# Patient Record
Sex: MALE | Race: WHITE | Employment: OTHER | URBAN - METROPOLITAN AREA
[De-identification: names, ages, dates, MRNs, and addresses within clinical notes are randomized per-mention and may not be internally consistent; named-entity substitution may affect disease eponyms.]

---

## 2018-07-09 PROBLEM — N40.1 BPH WITH OBSTRUCTION/LOWER URINARY TRACT SYMPTOMS: Status: ACTIVE | Noted: 2018-07-09

## 2018-07-09 PROBLEM — N39.0 RECURRENT UTI: Status: ACTIVE | Noted: 2018-07-09

## 2018-07-09 PROBLEM — N40.2 PROSTATE NODULE: Status: ACTIVE | Noted: 2018-07-09

## 2018-07-09 PROBLEM — N13.8 BPH WITH OBSTRUCTION/LOWER URINARY TRACT SYMPTOMS: Status: ACTIVE | Noted: 2018-07-09

## 2022-03-15 ENCOUNTER — OFFICE VISIT (OUTPATIENT)
Dept: ORTHOPEDIC SURGERY | Age: 72
End: 2022-03-15
Payer: MEDICARE

## 2022-03-15 VITALS — WEIGHT: 290 LBS | BODY MASS INDEX: 35.31 KG/M2 | HEIGHT: 76 IN

## 2022-03-15 DIAGNOSIS — M17.0 OSTEOARTHRITIS OF BOTH KNEES, UNSPECIFIED OSTEOARTHRITIS TYPE: ICD-10-CM

## 2022-03-15 DIAGNOSIS — M25.561 PAIN IN BOTH KNEES, UNSPECIFIED CHRONICITY: Primary | ICD-10-CM

## 2022-03-15 DIAGNOSIS — M25.561 PAIN IN BOTH KNEES, UNSPECIFIED CHRONICITY: ICD-10-CM

## 2022-03-15 DIAGNOSIS — M25.562 PAIN IN BOTH KNEES, UNSPECIFIED CHRONICITY: ICD-10-CM

## 2022-03-15 DIAGNOSIS — M25.562 PAIN IN BOTH KNEES, UNSPECIFIED CHRONICITY: Primary | ICD-10-CM

## 2022-03-15 PROCEDURE — 1101F PT FALLS ASSESS-DOCD LE1/YR: CPT | Performed by: ORTHOPAEDIC SURGERY

## 2022-03-15 PROCEDURE — G8536 NO DOC ELDER MAL SCRN: HCPCS | Performed by: ORTHOPAEDIC SURGERY

## 2022-03-15 PROCEDURE — G8417 CALC BMI ABV UP PARAM F/U: HCPCS | Performed by: ORTHOPAEDIC SURGERY

## 2022-03-15 PROCEDURE — 99204 OFFICE O/P NEW MOD 45 MIN: CPT | Performed by: ORTHOPAEDIC SURGERY

## 2022-03-15 PROCEDURE — 3017F COLORECTAL CA SCREEN DOC REV: CPT | Performed by: ORTHOPAEDIC SURGERY

## 2022-03-15 PROCEDURE — G8427 DOCREV CUR MEDS BY ELIG CLIN: HCPCS | Performed by: ORTHOPAEDIC SURGERY

## 2022-03-15 PROCEDURE — G8510 SCR DEP NEG, NO PLAN REQD: HCPCS | Performed by: ORTHOPAEDIC SURGERY

## 2022-03-15 RX ORDER — CIPROFLOXACIN 500 MG/1
TABLET ORAL
COMMUNITY
Start: 2021-09-13 | End: 2022-07-28

## 2022-03-15 RX ORDER — FLUOROURACIL 50 MG/G
CREAM TOPICAL
COMMUNITY
Start: 2021-09-24 | End: 2022-07-28

## 2022-03-15 RX ORDER — VANCOMYCIN HYDROCHLORIDE 125 MG/1
CAPSULE ORAL
COMMUNITY
Start: 2021-09-10 | End: 2022-07-28

## 2022-03-15 RX ORDER — NEOMYCIN SULFATE, POLYMYXIN B SULFATE, HYDROCORTISONE 3.5; 10000; 1 MG/ML; [USP'U]/ML; MG/ML
SOLUTION/ DROPS AURICULAR (OTIC)
COMMUNITY
Start: 2021-09-07 | End: 2022-07-28

## 2022-03-15 RX ORDER — AZITHROMYCIN 500 MG/1
TABLET, FILM COATED ORAL
COMMUNITY
Start: 2022-02-21 | End: 2022-07-28

## 2022-03-15 RX ORDER — SUCRALFATE 1 G/10ML
SUSPENSION ORAL
COMMUNITY
Start: 2022-03-07 | End: 2022-07-28

## 2022-03-15 RX ORDER — PANTOPRAZOLE SODIUM 40 MG/1
40 TABLET, DELAYED RELEASE ORAL DAILY
COMMUNITY
Start: 2022-03-07 | End: 2022-07-28

## 2022-03-15 RX ORDER — CIPROFLOXACIN 250 MG/1
TABLET, FILM COATED ORAL
COMMUNITY
Start: 2021-09-13 | End: 2022-07-28

## 2022-03-15 RX ORDER — PANTOPRAZOLE SODIUM 40 MG/1
TABLET, DELAYED RELEASE ORAL
COMMUNITY
Start: 2022-03-07

## 2022-03-15 RX ORDER — MUPIROCIN 20 MG/G
OINTMENT TOPICAL
COMMUNITY
Start: 2021-12-17 | End: 2022-07-28

## 2022-03-15 RX ORDER — ECONAZOLE NITRATE 10 MG/G
CREAM TOPICAL
COMMUNITY
Start: 2021-09-24 | End: 2022-07-28

## 2022-03-15 RX ORDER — GUAIFENESIN 1200 MG
TABLET, EXTENDED RELEASE 12 HR ORAL
COMMUNITY

## 2022-03-15 RX ORDER — OMEPRAZOLE 20 MG/1
CAPSULE, DELAYED RELEASE ORAL
COMMUNITY
Start: 2021-12-28 | End: 2022-07-28

## 2022-03-15 RX ORDER — IBUPROFEN 800 MG/1
TABLET ORAL
COMMUNITY
Start: 2021-08-05 | End: 2022-07-28

## 2022-03-15 NOTE — PROGRESS NOTES
Name: Gray Whittington    : 1950     Service Dept: [address] [place] [address] Medicine    Chief Complaint   Patient presents with    Knee Pain        Visit Vitals  Ht 6' 4\" (1.93 m)   Wt 290 lb (131.5 kg)   BMI 35.30 kg/m²        No Known Allergies     Current Outpatient Medications   Medication Sig Dispense Refill    ibuprofen (MOTRIN) 800 mg tablet       azithromycin (ZITHROMAX) 500 mg tab       ciprofloxacin HCl (CIPRO) 250 mg tablet       ciprofloxacin HCl (CIPRO) 500 mg tablet       econazole nitrate (SPECTAZOLE) 1 % topical cream       fluorouraciL (EFUDEX) 5 % chemo cream       mupirocin (BACTROBAN) 2 % ointment       pantoprazole (PROTONIX) 40 mg tablet       neomycin-polymyxin-hydrocortisone (CORTISPORIN) otic solution       sucralfate (CARAFATE) 100 mg/mL suspension       vancomycin (VANCOCIN) 125 mg capsule       acetaminophen (TylenoL) 325 mg cap Tylenol 325 mg capsule   PRN      azithromycin (ZITHROMAX) 500 mg tab take 1 tablet by mouth once daily for 5 days      pantoprazole (PROTONIX) 40 mg tablet Take 40 mg by mouth daily.  mupirocin (BACTROBAN) 2 % ointment APPLY TO OPEN SORE TWICE DAILY UNTIL HEALED      sucralfate (CARAFATE) 100 mg/mL suspension take 10 milliliters by mouth twice a day ON AN EMPTY STOMACH      omeprazole (PRILOSEC) 20 mg capsule       cephALEXin (KEFLEX) 500 mg capsule TAKE 1 CAPSULE BY MOUTH TWICE DAILY FOR 7 DAYS      diclofenac (VOLTAREN) 1 % gel APPLY 4 GRAMS TO AFFECTED AREA THREE TIMES DAILY TO FOUR TIMES DAILY AS NEEDED      ondansetron hcl (ZOFRAN) 4 mg tablet TAKE 1 TO 2 TABLETS BY MOUTH EVERY 6 HOURS AS NEEDED FORNAUSEA AND VOMITING      trimethoprim-sulfamethoxazole (BACTRIM DS, SEPTRA DS) 160-800 mg per tablet TAKE 1 TABLET BY MOUTH TWICE DAILY FOR 10 DAYS      finasteride (PROSCAR) 5 mg tablet TAKE 1 TABLET DAILY 90 Tab 3    carvedilol (COREG) 6.25 mg tablet Take  by mouth two (2) times daily (with meals).       docusate sodium (COLACE) 100 mg capsule Take 100 mg by mouth.  omeprazole (PRILOSEC) 10 mg capsule Take 10 mg by mouth.  cholecalciferol (VITAMIN D3) 1,000 unit cap Take  by mouth daily.  ferrous sulfate (IRON) 325 mg (65 mg iron) tablet Take  by mouth Daily (before breakfast).  finasteride (PROSCAR) 5 mg tablet Take 1 Tab by mouth daily. 90 Tab 3    PRADAXA 150 mg capsule       dilTIAZem CD (CARDIZEM CD) 240 mg ER capsule         Patient Active Problem List   Diagnosis Code    Prostate nodule N40.2    BPH with obstruction/lower urinary tract symptoms N40.1, N13.8    Recurrent UTI N39.0      History reviewed. No pertinent family history. Social History     Socioeconomic History    Marital status:    Tobacco Use    Smoking status: Never Smoker    Smokeless tobacco: Never Used   Substance and Sexual Activity    Alcohol use: No      Past Surgical History:   Procedure Laterality Date    HX PARATHYROIDECTOMY        Past Medical History:   Diagnosis Date    Chronic atrial fibrillation (HCC)     Chronic diarrhea     HLD (hyperlipidemia)     HTN (hypertension)     Hyperparathyroidism (HCC)     Impaired glucose tolerance     Obesity     Pleural effusion         I have reviewed and agree with [address] [place] [address] Nw and ROS and intake form in chart and the record furthermore I have reviewed prior medical record(s) regarding this patients care during this appointment. Review of Systems:   Patient is a pleasant appearing individual, appropriately dressed, well hydrated, well nourished, who is alert, appropriately oriented for age, and in no acute distress with a normal gait and normal affect who does not appear to be in any significant pain.    Physical Exam:  Left Knee -Decrease range of motion with flexion, Knee arc of greater than 50 degrees, Some crepitation, Grossly neurovascularly intact, Good cap refill, No skin lesion, Moderate swelling, No gross instability, Some quadriceps weakness Kellgren and Johnnie Homme at least grade 3    Right Knee -Decrease range of motion with flexion, Some crepitation, Grossly neurovascularly intact, Good cap refill, No skin lesion, Moderate swelling, No gross instability, Some quadriceps weaknessKellgren and Harsh at least grade 3   Encounter Diagnoses     ICD-10-CM ICD-9-CM   1. Pain in both knees, unspecified chronicity  M25.561 719.46    M25.562    2. Osteoarthritis of both knees, unspecified osteoarthritis type  M17.0 715.96       HPI:  The patient is here with a chief complaint of bilateral knee pain, throbbing, burning pain, progressively getting worse. Pain is 6/10. X-rays are positive for severe OA, bilateral knees. Assessment/Plan:  1. Bilateral knee severe OA, failed conservative treatment. Plan will be for right total knee replacement and then 2 months later left total knee replacement with general medical and cardiac clearance. He is on Pradaxa as a blood thinner, so we will use that as well and go from there. He will need to stop his Pradaxa at least 3 days before the surgery. As part of continued conservative pain management options the patient was advised to utilize Tylenol or OTC NSAIDS as long as it is not medically contraindicated. Return to Office: Follow-up and Dispositions    · Return for schedule for surgery. Scribed by Chinedu Bustos LPN as dictated by [facility] JOCELYNE Luna MD.  Documentation True and Accepted Misha Luna MD

## 2022-03-15 NOTE — PATIENT INSTRUCTIONS
Knee Arthritis: Care Instructions  Your Care Instructions     Knee arthritis is a breakdown of the cartilage that cushions your knee joint. When the cartilage wears down, your bones rub against each other. This causes pain and stiffness. Knee arthritis tends to get worse with time. Treatment for knee arthritis involves reducing pain, making the leg muscles stronger, and staying at a healthy body weight. The treatment usually does not improve the health of the cartilage, but it can reduce pain and improve how well your knee works. You can take simple measures to protect your knee joints, ease your pain, and help you stay active. Follow-up care is a key part of your treatment and safety. Be sure to make and go to all appointments, and call your doctor if you are having problems. It's also a good idea to know your test results and keep a list of the medicines you take. How can you care for yourself at home? · Know that knee arthritis will cause more pain on some days than on others. · Stay at a healthy weight. Lose weight if you are overweight. When you stand up, the pressure on your knees from every pound of body weight is multiplied four times. So if you lose 10 pounds, you will reduce the pressure on your knees by 40 pounds. · Talk to your doctor or physical therapist about exercises that will help ease joint pain. ? Stretch to help prevent stiffness and to prevent injury before you exercise. You may enjoy gentle forms of yoga to help keep your knee joints and muscles flexible. ? Walk instead of jog.  ? Ride a bike. This makes your thigh muscles stronger and takes pressure off your knee. ? Wear well-fitting and comfortable shoes. ? Exercise in chest-deep water. This can help you exercise longer with less pain. ? Avoid exercises that include squatting or kneeling. They can put a lot of strain on your knees.   ? Talk to your doctor to make sure that the exercise you do is not making the arthritis worse.  · Do not sit for long periods of time. Try to walk once in a while to keep your knee from getting stiff. · Ask your doctor or physical therapist whether shoe inserts may reduce your arthritis pain. · If you can afford it, get new athletic shoes at least every year. This can help reduce the strain on your knees. · Use a device to help you do everyday activities. ? A cane or walking stick can help you keep your balance when you walk. Hold the cane or walking stick in the hand opposite the painful knee. ? If you feel like you may fall when you walk, try using crutches or a front-wheeled walker. These can prevent falls that could cause more damage to your knee. ? A knee brace may help keep your knee stable and prevent pain. ? You also can use other things to make life easier, such as a higher toilet seat and handrails in the bathtub or shower. · Take pain medicines exactly as directed. ? Do not wait until you are in severe pain. You will get better results if you take it sooner. ? If you are not taking a prescription pain medicine, take an over-the-counter medicine such as acetaminophen (Tylenol), ibuprofen (Advil, Motrin), or naproxen (Aleve). Read and follow all instructions on the label. ? Do not take two or more pain medicines at the same time unless the doctor told you to. Many pain medicines have acetaminophen, which is Tylenol. Too much acetaminophen (Tylenol) can be harmful. ? Tell your doctor if you take a blood thinner, have diabetes, or have allergies to shellfish. · Ask your doctor if you might benefit from a shot of steroid medicine into your knee. This may provide pain relief for several months. · Many people take the supplements glucosamine and chondroitin for osteoarthritis. Some people feel they help, but the medical research does not show that they work. Talk to your doctor before you take these supplements. When should you call for help?    Call your doctor now or seek immediate medical care if:    · You have sudden swelling, warmth, or pain in your knee.     · You have knee pain and a fever or rash.     · You have such bad pain that you cannot use your knee. Watch closely for changes in your health, and be sure to contact your doctor if you have any problems. Where can you learn more? Go to http://www.gray.com/  Enter W187 in the search box to learn more about \"Knee Arthritis: Care Instructions. \"  Current as of: December 20, 2021               Content Version: 13.2  © 8892-6251 [employer]. Care instructions adapted under license by [employer] (which disclaims liability or warranty for this information). If you have questions about a medical condition or this instruction, always ask your healthcare professional. [address] any warranty or liability for your use of this information.

## 2022-03-15 NOTE — LETTER
3/16/2022    Patient: Jose De Guzman   YOB: 1950   Date of Visit: 3/15/2022     Loyda Pereira MD  [facility] [address]  4 [facility]  [address] [place] [address]  Via Fax: [phone]    Dear Loyda Pereira MD,      Thank you for referring Mr. Jose De Guzman to [address] [place] [address] for evaluation. My notes for this consultation are attached. If you have questions, please do not hesitate to call me. I look forward to following your patient along with you.       Sincerely,    Eduard Hollis MD

## 2022-03-18 PROBLEM — N40.2 PROSTATE NODULE: Status: ACTIVE | Noted: 2018-07-09

## 2022-03-18 PROBLEM — N39.0 RECURRENT UTI: Status: ACTIVE | Noted: 2018-07-09

## 2022-03-20 PROBLEM — N13.8 BPH WITH OBSTRUCTION/LOWER URINARY TRACT SYMPTOMS: Status: ACTIVE | Noted: 2018-07-09

## 2022-03-20 PROBLEM — N40.1 BPH WITH OBSTRUCTION/LOWER URINARY TRACT SYMPTOMS: Status: ACTIVE | Noted: 2018-07-09

## 2022-07-06 ENCOUNTER — HOSPITAL ENCOUNTER (OUTPATIENT)
Dept: LAB | Age: 72
Discharge: HOME | End: 2022-07-06
Payer: MEDICARE

## 2022-07-06 ENCOUNTER — HOSPITAL ENCOUNTER (OUTPATIENT)
Dept: NON INVASIVE DIAGNOSTICS | Age: 72
Discharge: HOME | End: 2022-07-06
Payer: MEDICARE

## 2022-07-06 ENCOUNTER — HOSPITAL ENCOUNTER (OUTPATIENT)
Dept: GENERAL RADIOLOGY | Age: 72
Discharge: HOME | End: 2022-07-06
Attending: ORTHOPAEDIC SURGERY
Payer: MEDICARE

## 2022-07-06 DIAGNOSIS — M25.561 PAIN IN BOTH KNEES, UNSPECIFIED CHRONICITY: ICD-10-CM

## 2022-07-06 DIAGNOSIS — M17.0 OSTEOARTHRITIS OF BOTH KNEES, UNSPECIFIED OSTEOARTHRITIS TYPE: ICD-10-CM

## 2022-07-06 DIAGNOSIS — M25.562 PAIN IN BOTH KNEES, UNSPECIFIED CHRONICITY: ICD-10-CM

## 2022-07-06 LAB
ANION GAP SERPL CALC-SCNC: 4 MMOL/L (ref 3–18)
BASOPHILS # BLD: 0.1 K/UL (ref 0–0.1)
BASOPHILS NFR BLD: 1 % (ref 0–2)
BUN SERPL-MCNC: 21 MG/DL (ref 7–18)
BUN/CREAT SERPL: 19 (ref 12–20)
CA-I BLD-MCNC: 9.2 MG/DL (ref 8.5–10.1)
CALCULATED R AXIS, ECG10: 94 DEGREES
CALCULATED T AXIS, ECG11: 4 DEGREES
CHLORIDE SERPL-SCNC: 104 MMOL/L (ref 100–111)
CO2 SERPL-SCNC: 28 MMOL/L (ref 21–32)
CREAT SERPL-MCNC: 1.11 MG/DL (ref 0.6–1.3)
DIAGNOSIS, 93000: NORMAL
DIFFERENTIAL METHOD BLD: ABNORMAL
EOSINOPHIL # BLD: 0.1 K/UL (ref 0–0.4)
EOSINOPHIL NFR BLD: 2 % (ref 0–5)
ERYTHROCYTE [DISTWIDTH] IN BLOOD BY AUTOMATED COUNT: 13.3 % (ref 11.6–14.5)
GLUCOSE SERPL-MCNC: 99 MG/DL (ref 74–99)
HCT VFR BLD AUTO: 44.2 % (ref 36–48)
HGB BLD-MCNC: 14.8 G/DL (ref 13–16)
IMM GRANULOCYTES # BLD AUTO: 0 K/UL (ref 0–0.04)
IMM GRANULOCYTES NFR BLD AUTO: 0 % (ref 0–0.5)
LYMPHOCYTES # BLD: 1.5 K/UL (ref 0.9–3.6)
LYMPHOCYTES NFR BLD: 30 % (ref 21–52)
MCH RBC QN AUTO: 31.8 PG (ref 24–34)
MCHC RBC AUTO-ENTMCNC: 33.5 G/DL (ref 31–37)
MCV RBC AUTO: 95.1 FL (ref 78–100)
MONOCYTES # BLD: 0.4 K/UL (ref 0.05–1.2)
MONOCYTES NFR BLD: 8 % (ref 3–10)
NEUTS SEG # BLD: 2.9 K/UL (ref 1.8–8)
NEUTS SEG NFR BLD: 59 % (ref 40–73)
NRBC # BLD: 0 K/UL (ref 0–0.01)
NRBC BLD-RTO: 0 PER 100 WBC
PLATELET # BLD AUTO: 121 K/UL (ref 135–420)
PMV BLD AUTO: 10.6 FL (ref 9.2–11.8)
POTASSIUM SERPL-SCNC: 4.2 MMOL/L (ref 3.5–5.5)
Q-T INTERVAL, ECG07: 478 MS
QRS DURATION, ECG06: 150 MS
QTC CALCULATION (BEZET), ECG08: 537 MS
RBC # BLD AUTO: 4.65 M/UL (ref 4.35–5.65)
SODIUM SERPL-SCNC: 136 MMOL/L (ref 136–145)
VENTRICULAR RATE, ECG03: 76 BPM
WBC # BLD AUTO: 5 K/UL (ref 4.6–13.2)

## 2022-07-06 PROCEDURE — 93005 ELECTROCARDIOGRAM TRACING: CPT

## 2022-07-06 PROCEDURE — 85025 COMPLETE CBC W/AUTO DIFF WBC: CPT

## 2022-07-06 PROCEDURE — 80048 BASIC METABOLIC PNL TOTAL CA: CPT

## 2022-07-06 PROCEDURE — 36415 COLL VENOUS BLD VENIPUNCTURE: CPT

## 2022-07-06 PROCEDURE — 71046 X-RAY EXAM CHEST 2 VIEWS: CPT

## 2022-07-07 LAB
BACTERIA SPEC CULT: ABNORMAL
BACTERIA SPEC CULT: ABNORMAL
SPECIAL REQUESTS,SREQ: ABNORMAL

## 2022-07-28 DIAGNOSIS — Z96.651 STATUS POST TOTAL RIGHT KNEE REPLACEMENT: Primary | ICD-10-CM

## 2022-08-04 ENCOUNTER — TRANSCRIBE ORDER (OUTPATIENT)
Dept: REGISTRATION | Age: 72
End: 2022-08-04

## 2022-08-04 ENCOUNTER — HOSPITAL ENCOUNTER (OUTPATIENT)
Dept: PREADMISSION TESTING | Age: 72
Discharge: HOME | End: 2022-08-04

## 2022-08-04 ENCOUNTER — OFFICE VISIT (OUTPATIENT)
Dept: ORTHOPEDIC SURGERY | Age: 72
End: 2022-08-04
Payer: MEDICARE

## 2022-08-04 DIAGNOSIS — M25.561 PAIN IN BOTH KNEES, UNSPECIFIED CHRONICITY: Primary | ICD-10-CM

## 2022-08-04 DIAGNOSIS — M17.0 OSTEOARTHRITIS OF BOTH KNEES, UNSPECIFIED OSTEOARTHRITIS TYPE: ICD-10-CM

## 2022-08-04 DIAGNOSIS — M25.562 PAIN IN BOTH KNEES, UNSPECIFIED CHRONICITY: Primary | ICD-10-CM

## 2022-08-04 PROCEDURE — G8536 NO DOC ELDER MAL SCRN: HCPCS | Performed by: ORTHOPAEDIC SURGERY

## 2022-08-04 PROCEDURE — 1101F PT FALLS ASSESS-DOCD LE1/YR: CPT | Performed by: ORTHOPAEDIC SURGERY

## 2022-08-04 PROCEDURE — 99214 OFFICE O/P EST MOD 30 MIN: CPT | Performed by: ORTHOPAEDIC SURGERY

## 2022-08-04 PROCEDURE — G8417 CALC BMI ABV UP PARAM F/U: HCPCS | Performed by: ORTHOPAEDIC SURGERY

## 2022-08-04 PROCEDURE — 3017F COLORECTAL CA SCREEN DOC REV: CPT | Performed by: ORTHOPAEDIC SURGERY

## 2022-08-04 PROCEDURE — 1123F ACP DISCUSS/DSCN MKR DOCD: CPT | Performed by: ORTHOPAEDIC SURGERY

## 2022-08-04 PROCEDURE — G8432 DEP SCR NOT DOC, RNG: HCPCS | Performed by: ORTHOPAEDIC SURGERY

## 2022-08-04 PROCEDURE — G8427 DOCREV CUR MEDS BY ELIG CLIN: HCPCS | Performed by: ORTHOPAEDIC SURGERY

## 2022-08-04 RX ORDER — TROPICAMIDE 10 MG/ML
SOLUTION/ DROPS OPHTHALMIC
Status: ON HOLD | COMMUNITY
Start: 2022-04-01 | End: 2022-08-26

## 2022-08-04 RX ORDER — CEPHALEXIN 500 MG/1
500 CAPSULE ORAL EVERY 8 HOURS
Qty: 9 CAPSULE | Refills: 0 | Status: SHIPPED | OUTPATIENT
Start: 2022-08-04 | End: 2022-08-07

## 2022-08-04 RX ORDER — ONDANSETRON 4 MG/1
4 TABLET, ORALLY DISINTEGRATING ORAL
Qty: 20 TABLET | Refills: 0 | Status: SHIPPED | OUTPATIENT
Start: 2022-08-04 | End: 2022-10-11 | Stop reason: SDUPTHER

## 2022-08-04 RX ORDER — PREDNISOLONE ACETATE 10 MG/ML
SUSPENSION/ DROPS OPHTHALMIC
Status: ON HOLD | COMMUNITY
Start: 2022-04-01 | End: 2022-08-26

## 2022-08-04 RX ORDER — OXYCODONE AND ACETAMINOPHEN 5; 325 MG/1; MG/1
1 TABLET ORAL
Qty: 30 TABLET | Refills: 0 | Status: SHIPPED | OUTPATIENT
Start: 2022-08-04 | End: 2022-08-18

## 2022-08-04 RX ORDER — TOBRAMYCIN 3 MG/ML
SOLUTION/ DROPS OPHTHALMIC
Status: ON HOLD | COMMUNITY
Start: 2022-04-01 | End: 2022-08-26

## 2022-08-04 RX ORDER — ONDANSETRON 4 MG/1
TABLET, ORALLY DISINTEGRATING ORAL
COMMUNITY
Start: 2022-07-13 | End: 2022-08-04 | Stop reason: ALTCHOICE

## 2022-08-04 NOTE — LETTER
8/8/2022    Patient: Jaison Nuñez   YOB: 1950   Date of Visit: 8/4/2022     Raad Perez MD  [facility] [address]  4 [facility]  [address] [place] [id number]  Via Fax: [phone]    Dear Raad Perez MD,      Thank you for referring Mr. Jaison Nuñez to [address] [place] [address] for evaluation. My notes for this consultation are attached. If you have questions, please do not hesitate to call me. I look forward to following your patient along with you.       Sincerely,    Any Donohue MD

## 2022-08-04 NOTE — PROGRESS NOTES
Name: Leonel Arroyo    : 1950     Service Dept: [address] [place] [address]    Chief Complaint   Patient presents with    Knee Pain    Pre-op Exam        There were no vitals taken for this visit. No Known Allergies     Current Outpatient Medications   Medication Sig Dispense Refill    ondansetron (ZOFRAN ODT) 4 mg disintegrating tablet       prednisoLONE acetate (PRED FORTE) 1 % ophthalmic suspension prednisolone acetate 1 % eye drops,suspension   instill 1 drop four times a day IN OPERATIVE EYE STARTING 1 DAY P...  (REFER TO PRESCRIPTION NOTES). tobramycin (TOBREX) 0.3 % ophthalmic solution       tropicamide (mydRIACYL) 1 % ophthalmic solution       apixaban (ELIQUIS) 5 mg tablet Take 5 mg by mouth two (2) times a day. acetaminophen 325 mg cap Tylenol 325 mg capsule   PRN      pantoprazole (PROTONIX) 40 mg tablet       cephALEXin (KEFLEX) 500 mg capsule TAKE 1 CAPSULE BY MOUTH TWICE DAILY FOR 7 DAYS      ondansetron hcl (ZOFRAN) 4 mg tablet TAKE 1 TO 2 TABLETS BY MOUTH EVERY 6 HOURS AS NEEDED FORNAUSEA AND VOMITING      finasteride (PROSCAR) 5 mg tablet TAKE 1 TABLET DAILY 90 Tab 3    carvedilol (COREG) 6.25 mg tablet Take  by mouth two (2) times daily (with meals). docusate sodium (COLACE) 100 mg capsule Take 100 mg by mouth. cholecalciferol (VITAMIN D3) 25 mcg (1,000 unit) cap Take  by mouth daily. ferrous sulfate 325 mg (65 mg iron) tablet Take  by mouth Daily (before breakfast). dilTIAZem CD (CARDIZEM CD) 240 mg ER capsule         Patient Active Problem List   Diagnosis Code    Prostate nodule N40.2    BPH with obstruction/lower urinary tract symptoms N40.1, N13.8    Recurrent UTI N39.0      History reviewed. No pertinent family history.    Social History     Socioeconomic History    Marital status:    Tobacco Use    Smoking status: Never    Smokeless tobacco: Never   Vaping Use    Vaping Use: Never used   Substance and Sexual Activity    Alcohol use: No      Past Surgical History:   Procedure Laterality Date    HX PARATHYROIDECTOMY        Past Medical History:   Diagnosis Date    Chronic atrial fibrillation (HCC)     Chronic diarrhea     HLD (hyperlipidemia)     HTN (hypertension)     Hyperparathyroidism (HCC)     Impaired glucose tolerance     Obesity     Pleural effusion         I have reviewed and agree with PFSH and ROS and intake form in chart and the record furthermore I have reviewed prior medical record(s) regarding this patients care during this appointment. Review of Systems:   Patient is a pleasant appearing individual, appropriately dressed, well hydrated, well nourished, who is alert, appropriately oriented for age, and in no acute distress with a normal gait and normal affect who does not appear to be in any significant pain. Physical Exam:  Right Knee -Decrease range of motion with flexion, Knee arc of greater than 50 degrees, Some crepitation, Grossly neurovascularly intact, Good cap refill, No skin lesion, Moderate swelling, some gross instability, Some quadriceps weakness, Kellgren and Harsh at least grade 3    Left Knee - Full Range of Motion, No crepitation, Grossly neurovascularly intact, Good cap refill, No skin lesion, No swelling, No gross instability, No quadriceps weakness     Inpatient status: The patient has admitted to severe pain in the affected knee and due to such pain they are unable to complete activities of daily living at home and/or work on a regular basis where conservative treatments have failed. After extensive discussion with the patient, they have chosen to receive a total knee replacement with the expectation of inpatient procedure. Their dependent functional status (i.e. lack of capable support and safety at home, pain management, comorbities, or difficulty ambulating with assistive walking devices) would deem them a candidate for an inpatient stay.  The patient acknowledges and understand the plan. The risks of surgery were explained to the patient which include but not limited to infection, nerve injury, artery injury, tendon injury, poor result, poor wound healing, unforeseen incidence, bleeding, infection, nerve damage, failure to improve, worsening of symptoms, morbidity, and mortality risks were explained. All questions were answered. Patient was told of no guarantees. Patient accepts all risks and benefits. A consent for surgery will be documented and signed by the patient or a legal guardian. All questions were answered. The procedure was explained in detail. The patient was counseled about the risks of ike Covid-19 during their perioperative period and any recovery window from their procedure. The patient was made aware that ike Covid-19 may worsen their prognosis for recovering from their procedure and lend to a higher morbidity and/or mortality risk. All material risks, benefits, and reasonable alternatives including postponing the procedure were discussed. The patient DOES wish to proceed with their procedure at this time. Encounter Diagnoses     ICD-10-CM ICD-9-CM   1. Pain in both knees, unspecified chronicity  M25.561 719.46    M25.562    2. Osteoarthritis of both knees, unspecified osteoarthritis type  M17.0 715.96       HPI:  The patient is here with a chief complaint of right knee pain, throbbing, burning pain, progressively getting worse. Pain is 7/10. X-rays are positive for severe OA of the right knee. Failed conservative treatment. Assessment/Plan:  Plan will be for right total knee replacement. No history of blood clots. He is on Eliquis for his heart. He is going to stop that 3 days before and we will tract him with tract patch as well and go from there. As part of continued conservative pain management options the patient was advised to utilize Tylenol or OTC NSAIDS as long as it is not medically contraindicated.      Return to Office: Follow-up and Dispositions    Return for already scheduled for surgery. Scribed by Daivd Merlin, LPN as dictated by [facility] JOCELYNE Chavez MD.  Documentation True and Accepted Misha Chavez MD

## 2022-08-04 NOTE — PATIENT INSTRUCTIONS
Knee Arthritis: Care Instructions  Your Care Instructions     Knee arthritis is a breakdown of the cartilage that cushions your knee joint. When the cartilage wears down, your bones rub against each other. This causes pain and stiffness. Knee arthritis tends to get worse with time. Treatment for knee arthritis involves reducing pain, making the leg muscles stronger, and staying at a healthy body weight. The treatment usually does not improve the health of the cartilage, but it can reduce pain and improve how well your knee works. You can take simple measures to protect your knee joints, ease your pain, and help you stay active. Follow-up care is a key part of your treatment and safety. Be sure to make and go to all appointments, and call your doctor if you are having problems. It's also a good idea to know your test results and keep a list of the medicines you take. How can you care for yourself at home? Know that knee arthritis will cause more pain on some days than on others. Stay at a healthy weight. Lose weight if you are overweight. When you stand up, the pressure on your knees from every pound of body weight is multiplied four times. So if you lose 10 pounds, you will reduce the pressure on your knees by 40 pounds. Talk to your doctor or physical therapist about exercises that will help ease joint pain. Stretch to help prevent stiffness and to prevent injury before you exercise. You may enjoy gentle forms of yoga to help keep your knee joints and muscles flexible. Walk instead of jog. Ride a bike. This makes your thigh muscles stronger and takes pressure off your knee. Wear well-fitting and comfortable shoes. Exercise in chest-deep water. This can help you exercise longer with less pain. Avoid exercises that include squatting or kneeling. They can put a lot of strain on your knees. Talk to your doctor to make sure that the exercise you do is not making the arthritis worse.   Do not sit for long periods of time. Try to walk once in a while to keep your knee from getting stiff. Ask your doctor or physical therapist whether shoe inserts may reduce your arthritis pain. If you can afford it, get new athletic shoes at least every year. This can help reduce the strain on your knees. Use a device to help you do everyday activities. A cane or walking stick can help you keep your balance when you walk. Hold the cane or walking stick in the hand opposite the painful knee. If you feel like you may fall when you walk, try using crutches or a front-wheeled walker. These can prevent falls that could cause more damage to your knee. A knee brace may help keep your knee stable and prevent pain. You also can use other things to make life easier, such as a higher toilet seat and handrails in the bathtub or shower. Take pain medicines exactly as directed. Do not wait until you are in severe pain. You will get better results if you take it sooner. If you are not taking a prescription pain medicine, take an over-the-counter medicine such as acetaminophen (Tylenol), ibuprofen (Advil, Motrin), or naproxen (Aleve). Read and follow all instructions on the label. Do not take two or more pain medicines at the same time unless the doctor told you to. Many pain medicines have acetaminophen, which is Tylenol. Too much acetaminophen (Tylenol) can be harmful. Tell your doctor if you take a blood thinner, have diabetes, or have allergies to shellfish. Ask your doctor if you might benefit from a shot of steroid medicine into your knee. This may provide pain relief for several months. Many people take the supplements glucosamine and chondroitin for osteoarthritis. Some people feel they help, but the medical research does not show that they work. Talk to your doctor before you take these supplements. When should you call for help?    Call your doctor now or seek immediate medical care if:    You have sudden swelling, warmth, or pain in your knee. You have knee pain and a fever or rash. You have such bad pain that you cannot use your knee. Watch closely for changes in your health, and be sure to contact your doctor if you have any problems. Where can you learn more? Go to http://www.gray.com/  Enter W187 in the search box to learn more about \"Knee Arthritis: Care Instructions. \"  Current as of: December 20, 2021               Content Version: 13.2  © 2006-2022 [employer] Care instructions adapted under license by [employer] (which disclaims liability or warranty for this information). If you have questions about a medical condition or this instruction, always ask your healthcare professional. [address] any warranty or liability for your use of this information.

## 2022-08-04 NOTE — H&P (VIEW-ONLY)
Name: Vijay Anthony    : 1950     Service Dept: [address] [place] [address] Medicine    Chief Complaint   Patient presents with    Knee Pain    Pre-op Exam        There were no vitals taken for this visit. No Known Allergies     Current Outpatient Medications   Medication Sig Dispense Refill    ondansetron (ZOFRAN ODT) 4 mg disintegrating tablet       prednisoLONE acetate (PRED FORTE) 1 % ophthalmic suspension prednisolone acetate 1 % eye drops,suspension   instill 1 drop four times a day IN OPERATIVE EYE STARTING 1 DAY P...  (REFER TO PRESCRIPTION NOTES). tobramycin (TOBREX) 0.3 % ophthalmic solution       tropicamide (mydRIACYL) 1 % ophthalmic solution       apixaban (ELIQUIS) 5 mg tablet Take 5 mg by mouth two (2) times a day. acetaminophen 325 mg cap Tylenol 325 mg capsule   PRN      pantoprazole (PROTONIX) 40 mg tablet       cephALEXin (KEFLEX) 500 mg capsule TAKE 1 CAPSULE BY MOUTH TWICE DAILY FOR 7 DAYS      ondansetron hcl (ZOFRAN) 4 mg tablet TAKE 1 TO 2 TABLETS BY MOUTH EVERY 6 HOURS AS NEEDED FORNAUSEA AND VOMITING      finasteride (PROSCAR) 5 mg tablet TAKE 1 TABLET DAILY 90 Tab 3    carvedilol (COREG) 6.25 mg tablet Take  by mouth two (2) times daily (with meals). docusate sodium (COLACE) 100 mg capsule Take 100 mg by mouth. cholecalciferol (VITAMIN D3) 25 mcg (1,000 unit) cap Take  by mouth daily. ferrous sulfate 325 mg (65 mg iron) tablet Take  by mouth Daily (before breakfast). dilTIAZem CD (CARDIZEM CD) 240 mg ER capsule         Patient Active Problem List   Diagnosis Code    Prostate nodule N40.2    BPH with obstruction/lower urinary tract symptoms N40.1, N13.8    Recurrent UTI N39.0      History reviewed. No pertinent family history.    Social History     Socioeconomic History    Marital status:    Tobacco Use    Smoking status: Never    Smokeless tobacco: Never   Vaping Use    Vaping Use: Never used   Substance and Sexual Activity    Alcohol use: No      Past Surgical History:   Procedure Laterality Date    HX PARATHYROIDECTOMY        Past Medical History:   Diagnosis Date    Chronic atrial fibrillation (HCC)     Chronic diarrhea     HLD (hyperlipidemia)     HTN (hypertension)     Hyperparathyroidism (HCC)     Impaired glucose tolerance     Obesity     Pleural effusion         I have reviewed and agree with PFSH and ROS and intake form in chart and the record furthermore I have reviewed prior medical record(s) regarding this patients care during this appointment. Review of Systems:   Patient is a pleasant appearing individual, appropriately dressed, well hydrated, well nourished, who is alert, appropriately oriented for age, and in no acute distress with a normal gait and normal affect who does not appear to be in any significant pain. Physical Exam:  Right Knee -Decrease range of motion with flexion, Knee arc of greater than 50 degrees, Some crepitation, Grossly neurovascularly intact, Good cap refill, No skin lesion, Moderate swelling, some gross instability, Some quadriceps weakness, Kellgren and Harsh at least grade 3    Left Knee - Full Range of Motion, No crepitation, Grossly neurovascularly intact, Good cap refill, No skin lesion, No swelling, No gross instability, No quadriceps weakness     Inpatient status: The patient has admitted to severe pain in the affected knee and due to such pain they are unable to complete activities of daily living at home and/or work on a regular basis where conservative treatments have failed. After extensive discussion with the patient, they have chosen to receive a total knee replacement with the expectation of inpatient procedure. Their dependent functional status (i.e. lack of capable support and safety at home, pain management, comorbities, or difficulty ambulating with assistive walking devices) would deem them a candidate for an inpatient stay.  The patient acknowledges and understand the plan. The risks of surgery were explained to the patient which include but not limited to infection, nerve injury, artery injury, tendon injury, poor result, poor wound healing, unforeseen incidence, bleeding, infection, nerve damage, failure to improve, worsening of symptoms, morbidity, and mortality risks were explained. All questions were answered. Patient was told of no guarantees. Patient accepts all risks and benefits. A consent for surgery will be documented and signed by the patient or a legal guardian. All questions were answered. The procedure was explained in detail. The patient was counseled about the risks of ike Covid-19 during their perioperative period and any recovery window from their procedure. The patient was made aware that ike Covid-19 may worsen their prognosis for recovering from their procedure and lend to a higher morbidity and/or mortality risk. All material risks, benefits, and reasonable alternatives including postponing the procedure were discussed. The patient DOES wish to proceed with their procedure at this time. Encounter Diagnoses     ICD-10-CM ICD-9-CM   1. Pain in both knees, unspecified chronicity  M25.561 719.46    M25.562    2. Osteoarthritis of both knees, unspecified osteoarthritis type  M17.0 715.96       HPI:  The patient is here with a chief complaint of right knee pain, throbbing, burning pain, progressively getting worse. Pain is 7/10. X-rays are positive for severe OA of the right knee. Failed conservative treatment. Assessment/Plan:  Plan will be for right total knee replacement. No history of blood clots. He is on Eliquis for his heart. He is going to stop that 3 days before and we will tract him with tract patch as well and go from there. As part of continued conservative pain management options the patient was advised to utilize Tylenol or OTC NSAIDS as long as it is not medically contraindicated.      Return to Office: Follow-up and Dispositions    Return for already scheduled for surgery. Scribed by Esther Moore LPN as dictated by [facility] JOCELYNE Wong MD.  Documentation True and Accepted Misha JOCELYNE Wong MD

## 2022-08-09 ENCOUNTER — ANESTHESIA EVENT (OUTPATIENT)
Dept: SURGERY | Age: 72
End: 2022-08-09
Payer: MEDICARE

## 2022-08-10 RX ORDER — MAGNESIUM SULFATE 100 %
4 CRYSTALS MISCELLANEOUS AS NEEDED
Status: CANCELLED | OUTPATIENT
Start: 2022-08-10

## 2022-08-10 RX ORDER — DEXTROSE 50 % IN WATER (D50W) INTRAVENOUS SYRINGE
25-50 AS NEEDED
Status: CANCELLED | OUTPATIENT
Start: 2022-08-10

## 2022-08-17 ENCOUNTER — APPOINTMENT (OUTPATIENT)
Dept: GENERAL RADIOLOGY | Age: 72
End: 2022-08-17
Attending: NURSE PRACTITIONER
Payer: MEDICARE

## 2022-08-17 ENCOUNTER — HOSPITAL ENCOUNTER (OUTPATIENT)
Age: 72
Discharge: HOME | End: 2022-08-17
Attending: ORTHOPAEDIC SURGERY | Admitting: ORTHOPAEDIC SURGERY
Payer: MEDICARE

## 2022-08-17 ENCOUNTER — ANESTHESIA (OUTPATIENT)
Dept: SURGERY | Age: 72
End: 2022-08-17
Payer: MEDICARE

## 2022-08-17 ENCOUNTER — HOSPITAL ENCOUNTER (OUTPATIENT)
Age: 72
Setting detail: OBSERVATION
Discharge: HOME | End: 2022-08-18
Attending: FAMILY MEDICINE | Admitting: INTERNAL MEDICINE
Payer: MEDICARE

## 2022-08-17 VITALS
TEMPERATURE: 98 F | HEIGHT: 76 IN | RESPIRATION RATE: 14 BRPM | SYSTOLIC BLOOD PRESSURE: 128 MMHG | OXYGEN SATURATION: 98 % | HEART RATE: 70 BPM | DIASTOLIC BLOOD PRESSURE: 76 MMHG | BODY MASS INDEX: 36.42 KG/M2 | WEIGHT: 299.1 LBS

## 2022-08-17 DIAGNOSIS — I97.89 OTHER POSTOPERATIVE COMPLICATION INVOLVING CIRCULATORY SYSTEM: Primary | ICD-10-CM

## 2022-08-17 PROBLEM — M17.9 OA (OSTEOARTHRITIS) OF KNEE: Status: ACTIVE | Noted: 2022-08-17

## 2022-08-17 PROBLEM — T81.9XXA POST-OPERATIVE COMPLICATION: Status: ACTIVE | Noted: 2022-08-17

## 2022-08-17 LAB
ABO + RH BLD: NORMAL
ANION GAP SERPL CALC-SCNC: 9 MMOL/L (ref 3–18)
BASOPHILS # BLD: 0 K/UL (ref 0–0.1)
BASOPHILS NFR BLD: 0 % (ref 0–2)
BLOOD GROUP ANTIBODIES SERPL: NEGATIVE
BUN SERPL-MCNC: 16 MG/DL (ref 7–18)
BUN/CREAT SERPL: 11 (ref 12–20)
CA-I BLD-MCNC: 8.8 MG/DL (ref 8.5–10.1)
CHLORIDE SERPL-SCNC: 104 MMOL/L (ref 100–111)
CO2 SERPL-SCNC: 26 MMOL/L (ref 21–32)
CREAT SERPL-MCNC: 1.43 MG/DL (ref 0.6–1.3)
DIFFERENTIAL METHOD BLD: ABNORMAL
EOSINOPHIL # BLD: 0 K/UL (ref 0–0.4)
EOSINOPHIL NFR BLD: 0 % (ref 0–5)
ERYTHROCYTE [DISTWIDTH] IN BLOOD BY AUTOMATED COUNT: 13.5 % (ref 11.6–14.5)
GLUCOSE SERPL-MCNC: 139 MG/DL (ref 74–99)
HCT VFR BLD AUTO: 42 % (ref 36–48)
HGB BLD-MCNC: 13.9 G/DL (ref 13–16)
IMM GRANULOCYTES # BLD AUTO: 0.1 K/UL (ref 0–0.04)
IMM GRANULOCYTES NFR BLD AUTO: 1 % (ref 0–0.5)
INR PPP: 1.1 (ref 0.8–1.2)
LYMPHOCYTES # BLD: 1 K/UL (ref 0.9–3.6)
LYMPHOCYTES NFR BLD: 9 % (ref 21–52)
MCH RBC QN AUTO: 32.1 PG (ref 24–34)
MCHC RBC AUTO-ENTMCNC: 33.1 G/DL (ref 31–37)
MCV RBC AUTO: 97 FL (ref 78–100)
MONOCYTES # BLD: 0.6 K/UL (ref 0.05–1.2)
MONOCYTES NFR BLD: 6 % (ref 3–10)
NEUTS SEG # BLD: 9.1 K/UL (ref 1.8–8)
NEUTS SEG NFR BLD: 84 % (ref 40–73)
NRBC # BLD: 0 K/UL (ref 0–0.01)
NRBC BLD-RTO: 0 PER 100 WBC
PLATELET # BLD AUTO: 137 K/UL (ref 135–420)
PMV BLD AUTO: 10.6 FL (ref 9.2–11.8)
POTASSIUM SERPL-SCNC: 4.6 MMOL/L (ref 3.5–5.5)
PROTHROMBIN TIME: 14.9 SEC (ref 11.5–15.2)
RBC # BLD AUTO: 4.33 M/UL (ref 4.35–5.65)
SODIUM SERPL-SCNC: 139 MMOL/L (ref 136–145)
SPECIMEN EXP DATE BLD: NORMAL
WBC # BLD AUTO: 10.7 K/UL (ref 4.6–13.2)

## 2022-08-17 PROCEDURE — C1776 JOINT DEVICE (IMPLANTABLE): HCPCS | Performed by: ORTHOPAEDIC SURGERY

## 2022-08-17 PROCEDURE — 73560 X-RAY EXAM OF KNEE 1 OR 2: CPT

## 2022-08-17 PROCEDURE — 76210000063 HC OR PH I REC FIRST 0.5 HR: Performed by: ORTHOPAEDIC SURGERY

## 2022-08-17 PROCEDURE — 77030018673: Performed by: ORTHOPAEDIC SURGERY

## 2022-08-17 PROCEDURE — 74011000250 HC RX REV CODE- 250: Performed by: ORTHOPAEDIC SURGERY

## 2022-08-17 PROCEDURE — G0378 HOSPITAL OBSERVATION PER HR: HCPCS

## 2022-08-17 PROCEDURE — 97116 GAIT TRAINING THERAPY: CPT

## 2022-08-17 PROCEDURE — C1713 ANCHOR/SCREW BN/BN,TIS/BN: HCPCS | Performed by: ORTHOPAEDIC SURGERY

## 2022-08-17 PROCEDURE — 74011250636 HC RX REV CODE- 250/636: Performed by: NURSE ANESTHETIST, CERTIFIED REGISTERED

## 2022-08-17 PROCEDURE — 74011250636 HC RX REV CODE- 250/636: Performed by: NURSE PRACTITIONER

## 2022-08-17 PROCEDURE — 80048 BASIC METABOLIC PNL TOTAL CA: CPT

## 2022-08-17 PROCEDURE — 76010000131 HC OR TIME 2 TO 2.5 HR: Performed by: ORTHOPAEDIC SURGERY

## 2022-08-17 PROCEDURE — 36415 COLL VENOUS BLD VENIPUNCTURE: CPT

## 2022-08-17 PROCEDURE — 2709999900 HC NON-CHARGEABLE SUPPLY: Performed by: ORTHOPAEDIC SURGERY

## 2022-08-17 PROCEDURE — 74011000250 HC RX REV CODE- 250: Performed by: NURSE ANESTHETIST, CERTIFIED REGISTERED

## 2022-08-17 PROCEDURE — 76942 ECHO GUIDE FOR BIOPSY: CPT | Performed by: NURSE ANESTHETIST, CERTIFIED REGISTERED

## 2022-08-17 PROCEDURE — 77030039147 HC PWDR HEMSTS SURGICEL JNJ -D: Performed by: ORTHOPAEDIC SURGERY

## 2022-08-17 PROCEDURE — 74011000258 HC RX REV CODE- 258: Performed by: NURSE ANESTHETIST, CERTIFIED REGISTERED

## 2022-08-17 PROCEDURE — 77030031139 HC SUT VCRL2 J&J -A: Performed by: ORTHOPAEDIC SURGERY

## 2022-08-17 PROCEDURE — 86900 BLOOD TYPING SEROLOGIC ABO: CPT

## 2022-08-17 PROCEDURE — 77030031140 HC SUT VCRL3 J&J -A: Performed by: ORTHOPAEDIC SURGERY

## 2022-08-17 PROCEDURE — 74011000258 HC RX REV CODE- 258: Performed by: NURSE PRACTITIONER

## 2022-08-17 PROCEDURE — 99285 EMERGENCY DEPT VISIT HI MDM: CPT

## 2022-08-17 PROCEDURE — 77030013079 HC BLNKT BAIR HGGR 3M -A: Performed by: NURSE ANESTHETIST, CERTIFIED REGISTERED

## 2022-08-17 PROCEDURE — 74011000272 HC RX REV CODE- 272: Performed by: ORTHOPAEDIC SURGERY

## 2022-08-17 PROCEDURE — 77030029372 HC ADH SKN CLSR PRINEO J&J -C: Performed by: ORTHOPAEDIC SURGERY

## 2022-08-17 PROCEDURE — 77030038692 HC WND DEB SYS IRMX -B: Performed by: ORTHOPAEDIC SURGERY

## 2022-08-17 PROCEDURE — 77030013708 HC HNDPC SUC IRR PULS STRY –B: Performed by: ORTHOPAEDIC SURGERY

## 2022-08-17 PROCEDURE — 85025 COMPLETE CBC W/AUTO DIFF WBC: CPT

## 2022-08-17 PROCEDURE — 77030006812 HC BLD SAW RECIP STRY -B: Performed by: ORTHOPAEDIC SURGERY

## 2022-08-17 PROCEDURE — 85610 PROTHROMBIN TIME: CPT

## 2022-08-17 PROCEDURE — 74011250637 HC RX REV CODE- 250/637: Performed by: NURSE ANESTHETIST, CERTIFIED REGISTERED

## 2022-08-17 PROCEDURE — 97161 PT EVAL LOW COMPLEX 20 MIN: CPT

## 2022-08-17 PROCEDURE — 77030040393 HC DRSG OPTIFOAM GENT MDII -B: Performed by: ORTHOPAEDIC SURGERY

## 2022-08-17 PROCEDURE — 76210000025 HC REC RM PH II 3 TO 3.5 HR: Performed by: ORTHOPAEDIC SURGERY

## 2022-08-17 PROCEDURE — 77030041690 HC SYS PINNING KN JNJ -D: Performed by: ORTHOPAEDIC SURGERY

## 2022-08-17 PROCEDURE — 74011250637 HC RX REV CODE- 250/637: Performed by: FAMILY MEDICINE

## 2022-08-17 PROCEDURE — 76060000035 HC ANESTHESIA 2 TO 2.5 HR: Performed by: ORTHOPAEDIC SURGERY

## 2022-08-17 PROCEDURE — 64450 NJX AA&/STRD OTHER PN/BRANCH: CPT | Performed by: NURSE ANESTHETIST, CERTIFIED REGISTERED

## 2022-08-17 PROCEDURE — 77030011266 HC ELECTRD BLD INSL COVD -A: Performed by: ORTHOPAEDIC SURGERY

## 2022-08-17 PROCEDURE — 77030006835 HC BLD SAW SAG STRY -B: Performed by: ORTHOPAEDIC SURGERY

## 2022-08-17 PROCEDURE — 64447 NJX AA&/STRD FEMORAL NRV IMG: CPT | Performed by: NURSE ANESTHETIST, CERTIFIED REGISTERED

## 2022-08-17 PROCEDURE — 77030007866 HC KT SPN ANES BBMI -B: Performed by: NURSE ANESTHETIST, CERTIFIED REGISTERED

## 2022-08-17 DEVICE — ATTUNE KNEE SYSTEM FEMORAL POSTERIOR STABILIZED SIZE 8 RIGHT CEMENTED
Type: IMPLANTABLE DEVICE | Site: KNEE | Status: FUNCTIONAL
Brand: ATTUNE

## 2022-08-17 DEVICE — ATTUNE KNEE SYSTEM REVISION ROTATING PLATFORM TIBIAL BASE CEMENTED SIZE 8
Type: IMPLANTABLE DEVICE | Site: KNEE | Status: FUNCTIONAL
Brand: ATTUNE

## 2022-08-17 DEVICE — ATTUNE KNEE SYSTEM TIBIAL INSERT ROTATING PLATFORM POSTERIOR STABILIZED 8 5MM AOX
Type: IMPLANTABLE DEVICE | Site: KNEE | Status: FUNCTIONAL
Brand: ATTUNE

## 2022-08-17 DEVICE — ATTUNE PATELLA MEDIALIZED DOME 41MM CEMENTED AOX
Type: IMPLANTABLE DEVICE | Site: KNEE | Status: FUNCTIONAL
Brand: ATTUNE

## 2022-08-17 DEVICE — CEMENT BNE GENTAMC HV R+G 40GM -- PALACOS R+G 5036964: Type: IMPLANTABLE DEVICE | Site: KNEE | Status: FUNCTIONAL

## 2022-08-17 RX ORDER — KETOROLAC TROMETHAMINE 30 MG/ML
INJECTION, SOLUTION INTRAMUSCULAR; INTRAVENOUS AS NEEDED
Status: DISCONTINUED | OUTPATIENT
Start: 2022-08-17 | End: 2022-08-17 | Stop reason: HOSPADM

## 2022-08-17 RX ORDER — OXYCODONE AND ACETAMINOPHEN 5; 325 MG/1; MG/1
1 TABLET ORAL ONCE
Status: COMPLETED | OUTPATIENT
Start: 2022-08-17 | End: 2022-08-17

## 2022-08-17 RX ORDER — ALBUTEROL SULFATE 0.83 MG/ML
2.5 SOLUTION RESPIRATORY (INHALATION)
Status: DISCONTINUED | OUTPATIENT
Start: 2022-08-17 | End: 2022-08-17 | Stop reason: HOSPADM

## 2022-08-17 RX ORDER — DIPHENHYDRAMINE HYDROCHLORIDE 50 MG/ML
12.5 INJECTION, SOLUTION INTRAMUSCULAR; INTRAVENOUS
Status: CANCELLED | OUTPATIENT
Start: 2022-08-17

## 2022-08-17 RX ORDER — BUPIVACAINE HYDROCHLORIDE 2.5 MG/ML
INJECTION, SOLUTION EPIDURAL; INFILTRATION; INTRACAUDAL
Status: COMPLETED | OUTPATIENT
Start: 2022-08-17 | End: 2022-08-17

## 2022-08-17 RX ORDER — OXYCODONE AND ACETAMINOPHEN 5; 325 MG/1; MG/1
2 TABLET ORAL
Status: DISCONTINUED | OUTPATIENT
Start: 2022-08-17 | End: 2022-08-17 | Stop reason: HOSPADM

## 2022-08-17 RX ORDER — FACIAL-BODY WIPES
10 EACH TOPICAL DAILY PRN
Status: CANCELLED | OUTPATIENT
Start: 2022-08-17

## 2022-08-17 RX ORDER — ONDANSETRON 4 MG/1
4 TABLET, ORALLY DISINTEGRATING ORAL
Status: DISCONTINUED | OUTPATIENT
Start: 2022-08-17 | End: 2022-08-18 | Stop reason: HOSPADM

## 2022-08-17 RX ORDER — ONDANSETRON 2 MG/ML
4 INJECTION INTRAMUSCULAR; INTRAVENOUS ONCE
Status: DISCONTINUED | OUTPATIENT
Start: 2022-08-17 | End: 2022-08-17 | Stop reason: HOSPADM

## 2022-08-17 RX ORDER — SODIUM CHLORIDE 0.9 % (FLUSH) 0.9 %
5-40 SYRINGE (ML) INJECTION AS NEEDED
Status: CANCELLED | OUTPATIENT
Start: 2022-08-17

## 2022-08-17 RX ORDER — MAGNESIUM SULFATE 100 %
4 CRYSTALS MISCELLANEOUS AS NEEDED
Status: CANCELLED | OUTPATIENT
Start: 2022-08-17

## 2022-08-17 RX ORDER — FENTANYL CITRATE 50 UG/ML
INJECTION, SOLUTION INTRAMUSCULAR; INTRAVENOUS
Status: SHIPPED | OUTPATIENT
Start: 2022-08-17 | End: 2022-08-17

## 2022-08-17 RX ORDER — SODIUM CHLORIDE 0.9 % (FLUSH) 0.9 %
5-40 SYRINGE (ML) INJECTION AS NEEDED
Status: DISCONTINUED | OUTPATIENT
Start: 2022-08-17 | End: 2022-08-17 | Stop reason: HOSPADM

## 2022-08-17 RX ORDER — NALOXONE HYDROCHLORIDE 0.4 MG/ML
0.1 INJECTION, SOLUTION INTRAMUSCULAR; INTRAVENOUS; SUBCUTANEOUS
Status: DISCONTINUED | OUTPATIENT
Start: 2022-08-17 | End: 2022-08-17 | Stop reason: HOSPADM

## 2022-08-17 RX ORDER — PROPOFOL 10 MG/ML
INJECTION, EMULSION INTRAVENOUS
Status: DISCONTINUED | OUTPATIENT
Start: 2022-08-17 | End: 2022-08-17 | Stop reason: HOSPADM

## 2022-08-17 RX ORDER — SODIUM CHLORIDE, SODIUM LACTATE, POTASSIUM CHLORIDE, CALCIUM CHLORIDE 600; 310; 30; 20 MG/100ML; MG/100ML; MG/100ML; MG/100ML
25 INJECTION, SOLUTION INTRAVENOUS CONTINUOUS
Status: DISCONTINUED | OUTPATIENT
Start: 2022-08-17 | End: 2022-08-17 | Stop reason: HOSPADM

## 2022-08-17 RX ORDER — SODIUM CHLORIDE 0.9 % (FLUSH) 0.9 %
5-40 SYRINGE (ML) INJECTION AS NEEDED
Status: DISCONTINUED | OUTPATIENT
Start: 2022-08-17 | End: 2022-08-18 | Stop reason: HOSPADM

## 2022-08-17 RX ORDER — GABAPENTIN 300 MG/1
300 CAPSULE ORAL ONCE
Status: COMPLETED | OUTPATIENT
Start: 2022-08-17 | End: 2022-08-17

## 2022-08-17 RX ORDER — DEXAMETHASONE SODIUM PHOSPHATE 4 MG/ML
INJECTION, SOLUTION INTRA-ARTICULAR; INTRALESIONAL; INTRAMUSCULAR; INTRAVENOUS; SOFT TISSUE
Status: SHIPPED | OUTPATIENT
Start: 2022-08-17 | End: 2022-08-17

## 2022-08-17 RX ORDER — MIDAZOLAM HYDROCHLORIDE 1 MG/ML
INJECTION, SOLUTION INTRAMUSCULAR; INTRAVENOUS
Status: SHIPPED | OUTPATIENT
Start: 2022-08-17 | End: 2022-08-17

## 2022-08-17 RX ORDER — DEXTROSE 50 % IN WATER (D50W) INTRAVENOUS SYRINGE
25-50 AS NEEDED
Status: CANCELLED | OUTPATIENT
Start: 2022-08-17

## 2022-08-17 RX ORDER — SENNOSIDES 8.6 MG/1
1 TABLET ORAL 2 TIMES DAILY
Status: CANCELLED | OUTPATIENT
Start: 2022-08-17

## 2022-08-17 RX ORDER — SODIUM CHLORIDE 0.9 % (FLUSH) 0.9 %
5-40 SYRINGE (ML) INJECTION EVERY 8 HOURS
Status: CANCELLED | OUTPATIENT
Start: 2022-08-17

## 2022-08-17 RX ORDER — CEPHALEXIN 250 MG/1
500 CAPSULE ORAL
Status: COMPLETED | OUTPATIENT
Start: 2022-08-17 | End: 2022-08-17

## 2022-08-17 RX ORDER — SODIUM CHLORIDE 0.9 % (FLUSH) 0.9 %
5-40 SYRINGE (ML) INJECTION EVERY 8 HOURS
Status: DISCONTINUED | OUTPATIENT
Start: 2022-08-17 | End: 2022-08-18 | Stop reason: HOSPADM

## 2022-08-17 RX ORDER — ONDANSETRON 2 MG/ML
4 INJECTION INTRAMUSCULAR; INTRAVENOUS
Status: DISCONTINUED | OUTPATIENT
Start: 2022-08-17 | End: 2022-08-18 | Stop reason: HOSPADM

## 2022-08-17 RX ORDER — BUPIVACAINE HYDROCHLORIDE 2.5 MG/ML
INJECTION, SOLUTION EPIDURAL; INFILTRATION; INTRACAUDAL AS NEEDED
Status: DISCONTINUED | OUTPATIENT
Start: 2022-08-17 | End: 2022-08-17 | Stop reason: HOSPADM

## 2022-08-17 RX ORDER — FLUMAZENIL 0.1 MG/ML
0.2 INJECTION INTRAVENOUS
Status: DISCONTINUED | OUTPATIENT
Start: 2022-08-17 | End: 2022-08-17 | Stop reason: HOSPADM

## 2022-08-17 RX ORDER — NALOXONE HYDROCHLORIDE 0.4 MG/ML
0.4 INJECTION, SOLUTION INTRAMUSCULAR; INTRAVENOUS; SUBCUTANEOUS AS NEEDED
Status: CANCELLED | OUTPATIENT
Start: 2022-08-17

## 2022-08-17 RX ORDER — DIPHENHYDRAMINE HYDROCHLORIDE 50 MG/ML
12.5 INJECTION, SOLUTION INTRAMUSCULAR; INTRAVENOUS
Status: DISCONTINUED | OUTPATIENT
Start: 2022-08-17 | End: 2022-08-17 | Stop reason: HOSPADM

## 2022-08-17 RX ORDER — EPHEDRINE SULFATE/0.9% NACL/PF 50 MG/5 ML
SYRINGE (ML) INTRAVENOUS AS NEEDED
Status: DISCONTINUED | OUTPATIENT
Start: 2022-08-17 | End: 2022-08-17 | Stop reason: HOSPADM

## 2022-08-17 RX ORDER — FENTANYL CITRATE 50 UG/ML
50 INJECTION, SOLUTION INTRAMUSCULAR; INTRAVENOUS
Status: DISCONTINUED | OUTPATIENT
Start: 2022-08-17 | End: 2022-08-17 | Stop reason: HOSPADM

## 2022-08-17 RX ORDER — ONDANSETRON 2 MG/ML
4 INJECTION INTRAMUSCULAR; INTRAVENOUS
Status: DISCONTINUED | OUTPATIENT
Start: 2022-08-17 | End: 2022-08-17 | Stop reason: HOSPADM

## 2022-08-17 RX ORDER — ACETAMINOPHEN 650 MG/1
650 SUPPOSITORY RECTAL
Status: DISCONTINUED | OUTPATIENT
Start: 2022-08-17 | End: 2022-08-18 | Stop reason: HOSPADM

## 2022-08-17 RX ORDER — POLYETHYLENE GLYCOL 3350 17 G/17G
17 POWDER, FOR SOLUTION ORAL DAILY PRN
Status: DISCONTINUED | OUTPATIENT
Start: 2022-08-17 | End: 2022-08-18 | Stop reason: HOSPADM

## 2022-08-17 RX ORDER — FENTANYL CITRATE 50 UG/ML
50 INJECTION, SOLUTION INTRAMUSCULAR; INTRAVENOUS AS NEEDED
Status: DISCONTINUED | OUTPATIENT
Start: 2022-08-17 | End: 2022-08-17 | Stop reason: HOSPADM

## 2022-08-17 RX ORDER — ACETAMINOPHEN 325 MG/1
650 TABLET ORAL
Status: DISCONTINUED | OUTPATIENT
Start: 2022-08-17 | End: 2022-08-18 | Stop reason: HOSPADM

## 2022-08-17 RX ORDER — ACETAMINOPHEN 500 MG
1000 TABLET ORAL ONCE
Status: COMPLETED | OUTPATIENT
Start: 2022-08-17 | End: 2022-08-17

## 2022-08-17 RX ORDER — ACETAMINOPHEN 325 MG/1
650 TABLET ORAL
Status: CANCELLED | OUTPATIENT
Start: 2022-08-17

## 2022-08-17 RX ORDER — BUPIVACAINE HYDROCHLORIDE 5 MG/ML
INJECTION, SOLUTION EPIDURAL; INTRACAUDAL
Status: SHIPPED | OUTPATIENT
Start: 2022-08-17 | End: 2022-08-17

## 2022-08-17 RX ORDER — OXYCODONE AND ACETAMINOPHEN 10; 325 MG/1; MG/1
1 TABLET ORAL
Status: DISCONTINUED | OUTPATIENT
Start: 2022-08-17 | End: 2022-08-17 | Stop reason: HOSPADM

## 2022-08-17 RX ADMIN — MIDAZOLAM HYDROCHLORIDE 4 MG: 2 INJECTION, SOLUTION INTRAMUSCULAR; INTRAVENOUS at 07:29

## 2022-08-17 RX ADMIN — OXYCODONE AND ACETAMINOPHEN 1 TABLET: 5; 325 TABLET ORAL at 22:12

## 2022-08-17 RX ADMIN — BUPIVACAINE HYDROCHLORIDE 12 MG: 5 INJECTION, SOLUTION EPIDURAL; INTRACAUDAL; PERINEURAL at 08:32

## 2022-08-17 RX ADMIN — KETOROLAC TROMETHAMINE 30 MG: 30 INJECTION, SOLUTION INTRAMUSCULAR at 10:14

## 2022-08-17 RX ADMIN — CEPHALEXIN 500 MG: 250 CAPSULE ORAL at 22:12

## 2022-08-17 RX ADMIN — FENTANYL CITRATE 100 MCG: 50 INJECTION, SOLUTION INTRAMUSCULAR; INTRAVENOUS at 08:26

## 2022-08-17 RX ADMIN — PROPOFOL 30 MCG/KG/MIN: 10 INJECTION, EMULSION INTRAVENOUS at 08:42

## 2022-08-17 RX ADMIN — BUPIVACAINE HYDROCHLORIDE 30 ML: 2.5 INJECTION, SOLUTION EPIDURAL; INFILTRATION; INTRACAUDAL; PERINEURAL at 07:34

## 2022-08-17 RX ADMIN — GABAPENTIN 300 MG: 300 CAPSULE ORAL at 07:07

## 2022-08-17 RX ADMIN — Medication 20 MG: at 09:48

## 2022-08-17 RX ADMIN — DEXAMETHASONE SODIUM PHOSPHATE 10 MG: 4 INJECTION, SOLUTION INTRAMUSCULAR; INTRAVENOUS at 07:34

## 2022-08-17 RX ADMIN — Medication 10 MG: at 09:41

## 2022-08-17 RX ADMIN — SODIUM CHLORIDE, POTASSIUM CHLORIDE, SODIUM LACTATE AND CALCIUM CHLORIDE 25 ML/HR: 600; 310; 30; 20 INJECTION, SOLUTION INTRAVENOUS at 07:10

## 2022-08-17 RX ADMIN — TRANEXAMIC ACID 1 G: 1 INJECTION, SOLUTION INTRAVENOUS at 08:35

## 2022-08-17 RX ADMIN — MIDAZOLAM HYDROCHLORIDE 2 MG: 1 INJECTION, SOLUTION INTRAMUSCULAR; INTRAVENOUS at 08:26

## 2022-08-17 RX ADMIN — ACETAMINOPHEN 1000 MG: 500 TABLET ORAL at 07:07

## 2022-08-17 RX ADMIN — CEFAZOLIN 2 G: 2 INJECTION, POWDER, FOR SOLUTION INTRAMUSCULAR; INTRAVENOUS at 08:19

## 2022-08-17 NOTE — INTERVAL H&P NOTE
Update History & Physical    The Patient's History and Physical was reviewed with the patient. The patient was examined. There was no change. The surgical site was confirmed by the patient and me. Patient understands and wants to proceed with the procedure. If applicable, I have discussed with the patient / power of  the rationale for blood component transfusion; its benefits in treating or preventing fatigue, organ damage, or death; and its risk which includes mild transfusion reactions, rare risk of blood borne infection, or more serious but rare reactions. I have discussed the alternatives to transfusion, including the risk and consequences of not receiving transfusion. The patient / Joy Gregory of  had an opportunity to ask questions and had agreed to proceed with transfusion of blood components. Plan:  The risk, benefits, expected outcome, and alternative to the recommended procedure have been discussed with the patient.       Electronically signed by SAAD Lambert on 8/17/2022 at 7:29 AM

## 2022-08-17 NOTE — PERIOP NOTES
Trac Patch devices applied to right knee by Lyndsey Liu, TracPatch rep.  REF# [id number] LOT [id number] EXP 6/27/2023

## 2022-08-17 NOTE — DISCHARGE INSTRUCTIONS
TOTAL KNEE REPLACEMENT DISCHARGE INFORMATION    You have undergone a Total Knee Replacement. The following list is to provide you with some expectations over the next week upon your discharge from the hospital.     Please continue your Eliquis starting tomorrow as directed. Please be sure to continue your thigh-high compression stockings on both sides until instructed to discontinue them. Over the course of the next week, you should continue thigh high stockings on the operative leg, DO NOT GET THE INCISION WET until instructed to do so. Please make sure the stockings on the operative leg are pulled up all the way to the thigh to prevent any creases which may result in abrasions or creases in the skin. If the stockings are creating creases resulting in abrasions or blistering on the operative leg please remove the stockings. You may take the stockings off on the nonoperative leg once you arrive home. You may notice some bruising on your thigh and it may extend all the way to the ankles. That is perfectly normal early on. You may experience a clicking noise in your knee and that is normal because of the artificial knee. It is important to remember if you have any surgical procedure including dental procedures which may result in bleeding that an antibiotic 1 hour before the procedure will be required. Please let the provider performing the procedure know that you have artificial joint. If an antibiotic is not given by them please call our office and give us at least 5 business days to get you the appropriate antibiotics if needed. This rule applies indefinitely. If an Ace wrap is placed on your knee you may remove the Ace wrap only 48 hours after your surgery. We will leave the stockings on.   During the course of your  over the next week, should you experience fevers of 101.5 F, a white drainage from the incision, extreme redness around the incision, or the incision begins to have a pungent smell; Please call our office or page Dr. Junior Grewal whose numbers are provided in your discharge paperwork. To Page Dr. Junior Grewal please call [phone] and dial [phone]. Have the  page whomever is on call for Orthopedics. These are signs of infection and it should be addressed immediately. Please do not drive until instructed to do so. If you need a refill on pain medication please allow at least 2 business days notice for any refills. Immediate refill request may not be possible. Medication refill requests will not be addressed during non-business hours. Please do not page the on-call provider for pain medication refills after hours. It is very important for you to begin your Outpatient Physical Therapy within a couple days of the day of your discharge and your appointment should have been set up. If your physical therapy has not been set up please call our office the next business day for assistance. Details provided in a separate sheet. Remove ace wrap in 48 hrs after surgery but keep stockings on. You may remove the stocking and keep the stocking off on the nonoperative leg. Finish all antibiotics, start the antibiotics as soon as you go home if you have prescribed antibiotics. 14.  You should perform your daily home exercises at least 4 times a day 30 minutes each time. Perform foot pumps on both feet at least 10 times every 15 minutes while awake. This helps prevent swelling in the leg and can help prevent blood clots in the leg. On the operative leg if you have significant swelling you can also lay down flat and put 3 pillows under the heel so the heel is above the heart level and then perform foot pumps 4 times a day for 10 minutes to help bring the swelling down. 15.  Do not place anything under your knee while sleeping at night. Elevate your heel so your  is straight while sleeping at night. 16.  Perform deep breathing exercises 10 times every hour while awake.   17.  If you had a nerve block and you are not having pain the day of the surgery, at nighttime it is okay to take 1 pain medication before going to sleep to help prevent excruciating pain when the nerve block wears off. 18.  You may be given an ice pack machine use that to help prevent swelling. Do not apply heat to the incision area. 19.  While you are awake at least 10 times every 30 minutes move your foot up and down as if you are pumping gas from both feet to help prevent swelling and to promote blood circulation in the calf. 20.  If you develop sudden onset of shortness of breath or severe calf pain please go to closest emergency room. 21. Your pain medicine is a Narcotic and may cause constipation. You may take an over the counter stool softener while taking pain medicine. 25.  You will get surveys either via text message or email after your surgery on a periodic basis. Please participate in the surveys as it helps to track your progress. ICE THERAPY WRAP:    Keep ice therapy wrap on when resting. DO not wear when moving or walking. Ice packs are reusable. Ice Therapy wrap holds two ice packs at a time. Things to watch for:             Increased swelling of the surgical site             Spreading of redness around the incision site             Drainage of pus from the incision site             Developing a fever of 101.5 °F or higher             If any of these symptoms occur you have any questions please contact our office at [phone]. If you need to talk to Dr. Lyly Noguera or his staff after hours please call the office and have the on-call service get in touch with the provider on call that day. Please note pain medications are not refilled after hours or on weekends. If Dr. Lyly Noguera or his staff do not call you back within 30 minutes. Please tell the  to try again. Phone: [phone]  www. [email or web address]

## 2022-08-17 NOTE — PERIOP NOTES
Pt had moderate amount of bleeding while walking with PT. Dressing reinforced with 4x4s, abd pads, and soft roll. New AAMIR hose and ACE wrap applied. Dr. Brittany Chavez aware.

## 2022-08-17 NOTE — ANESTHESIA PROCEDURE NOTES
Peripheral Block    Start time: 8/17/2022 7:29 AM  End time: 8/17/2022 7:34 AM  Performed by: Cesilia Wallace CRNA  Authorized by: Cesilia Wallace CRNA       Pre-procedure: Indications: at surgeon's request and post-op pain management    Preanesthetic Checklist: patient identified, risks and benefits discussed, site marked, timeout performed, anesthesia consent given, patient being monitored and fire risk safety assessment completed and verbalized    Timeout Time: 07:29 EDT      Block Type:   Block Type:   Adductor canal block  Laterality:  Right  Monitoring:  Standard ASA monitoring  Injection Technique:  Single shot  Procedures: ultrasound guided    Patient Position: supine  Prep: chlorhexidine    Needle Type:  Ultraplex  Needle Gauge:  21 G  Needle Localization:  Ultrasound guidance  Medication Injected:  Midazolam (VERSED) injection - IntraVENous   4 mg - 8/17/2022 7:29:00 AM  bupivacaine 0.25% - Peripheral Nerve Block   30 mL - 8/17/2022 7:34:00 AM  dexamethasone (DECADRON) 4 mg/mL injection - Peripheral Nerve Block   10 mg - 8/17/2022 7:34:00 AM  Med Admin Time: 8/17/2022 7:34 AM    Assessment:  Number of attempts:  1  Injection Assessment:  Incremental injection every 5 mL, no paresthesia, local visualized surrounding nerve on ultrasound, negative aspiration for blood and no intravascular symptoms  Patient tolerance:  Patient tolerated the procedure well with no immediate complications

## 2022-08-17 NOTE — ANESTHESIA PROCEDURE NOTES
Spinal Block    Start time: 8/17/2022 8:26 AM  End time: 8/17/2022 8:32 AM  Performed by: Latosha Alfonso CRNA  Authorized by: Latosha Alfonso CRNA     Pre-procedure:   Indications: primary anesthetic  Preanesthetic Checklist: patient identified, risks and benefits discussed, anesthesia consent, site marked, patient being monitored, timeout performed and fire risk safety assessment completed and verbalized    Timeout Time: 08:26 EDT      Spinal Block:   Patient Position:  Seated  Prep Region:  Lumbar  Prep: Betadine      Location:  L3-4  Technique:  Single shot  Local: fentaNYL citrate (PF) IV sedation - IntraVENous   100 mcg - 8/17/2022 8:26:00 AM  midazolam (VERSED) injection sedation - IntraVENous   2 mg - 8/17/2022 8:26:00 AM  bupivacaine (PF) (MARCAINE) 0.5 % (5 mg/mL) intrathecal - Intrathecal   12 mg - 8/17/2022 8:32:00 AM    Med Admin Time: 8/17/2022 8:32 AM    Needle:   Needle Type:  Pencil-tip  Needle Gauge:  25 G  Attempts:  1      Events: CSF confirmed, no blood with aspiration and no paresthesia        Assessment:  Insertion:  Uncomplicated  Patient tolerance:  Patient tolerated the procedure well with no immediate complications

## 2022-08-17 NOTE — ANESTHESIA POSTPROCEDURE EVALUATION
Procedure(s):  RIGHT TKA/ HIGH BMI [facility]).     regional, spinal    Anesthesia Post Evaluation      Multimodal analgesia: multimodal analgesia used between 6 hours prior to anesthesia start to PACU discharge  Patient location during evaluation: PACU  Patient participation: complete - patient participated  Level of consciousness: awake and alert  Pain management: adequate  Airway patency: patent  Anesthetic complications: no  Cardiovascular status: acceptable and stable  Respiratory status: acceptable, spontaneous ventilation and room air  Hydration status: acceptable  Comments: Ok to discharge when post op criteria met  Post anesthesia nausea and vomiting:  none  Final Post Anesthesia Temperature Assessment:  Normothermia (36.0-37.5 degrees C)      INITIAL Post-op Vital signs:   Vitals Value Taken Time   /75 08/17/22 1029   Temp 36.4 °C (97.5 °F) 08/17/22 1029   Pulse 67 08/17/22 1029   Resp 16 08/17/22 1029   SpO2 95 % 08/17/22 1029

## 2022-08-17 NOTE — PROGRESS NOTES
Problem: Mobility Impaired (Adult and Pediatric)  Goal: *Acute Goals and Plan of Care (Insert Text)  Description: Pt is MOD (I)  with gait and navigates stairs with MOD (I) . PLOF: [employer], cane, (I) ADLs. Outcome: Resolved/Met     Problem: Patient Education: Go to Patient Education Activity  Goal: Patient/Family Education  Outcome: Resolved/Met   PHYSICAL THERAPY EVALUATION AND DISCHARGE    Patient: Rocio Sotelo (44 y.o. male)  Date: 8/17/2022  Primary Diagnosis: Primary osteoarthritis of right knee [M17.11]  OA (osteoarthritis) of knee [M17.10]  Procedure(s) (LRB):  RIGHT TKA/ HIGH BMI (TRAC PATCH) (Right) Day of Surgery   Precautions:  WBAT    ASSESSMENT :  Based on the objective data described below, the patient presents s/p R TKA and he is WBAT. He is able to ambulate with a RW with MOD (I) and he is able to navigate stairs with MOD (I). He is educated on a HEP and tolerates well. He can return home with outpatient P.T. Patient does not require further skilled intervention at this level of care. PLAN :  Recommendations and Planned Interventions:   No formal PT needs identified at this time. Discharge Recommendations: Outpatient  Further Equipment Recommendations for Discharge: N/A     SUBJECTIVE:   Patient states I think I am ready to walk.     OBJECTIVE DATA SUMMARY:     Past Medical History:   Diagnosis Date    Chronic atrial fibrillation (HCC)     Chronic diarrhea     HLD (hyperlipidemia)     HTN (hypertension)     Hyperparathyroidism (HCC)     Impaired glucose tolerance     Obesity     Pleural effusion      Past Surgical History:   Procedure Laterality Date    HX PARATHYROIDECTOMY       Barriers to Learning/Limitations: None  Compensate with: N/A  Home Situation:   Home Situation  Home Environment: Private residence  # Steps to Enter: 5  Rails to Enter: Yes  Hand Rails : Bilateral  One/Two Story Residence: Two story, live on 1st floor  # of Interior Steps: [address] Rails: Both  Living Alone: Yes  Support Systems: Other Family Member(s)  Patient Expects to be Discharged to[de-identified] Home with outpatient services  Current DME Used/Available at Home: Cane, straight, Walker, rolling  Critical Behavior:  Neurologic State: Alert  Orientation Level: Oriented X4     Skin Integrity: Incision (comment) (right knee)  Skin Integumentary  Skin Integrity: Incision (comment) (right knee)     Strength:    Strength: Generally decreased, functional (R knee 4/5, SLR 1300, 4.5 hours after spinal)  Tone & Sensation:   Tone: Normal     Sensation: Impaired (feet tingling pelvic area numb)    Range Of Motion:   AROM: Generally decreased, functional (R knee 6-90)  PROM: Generally decreased, functional (R knee 4-95)    Posture:  Posture (WDL): Within defined limits    Functional Mobility:  Bed Mobility:  Rolling: Modified independent  Supine to Sit: Modified independent  Sit to Supine: Modified independent  Scooting: Modified independent  Transfers:  Sit to Stand: Modified independent  Stand to Sit: Modified independent  Balance:   Sitting: Intact  Standing: Intact  Ambulation/Gait Training:  Distance (ft): 800 Feet (ft)  Assistive Device: Walker, rolling  Ambulation - Level of Assistance: Modified independent     Gait Description (WDL): Exceptions to WDL     Right Side Weight Bearing: As tolerated  Stairs:  Number of Stairs Trained: 5 (reciprocating, started bleeding and returned to bed following steps.)  Stairs - Level of Assistance: Modified independent  Rail Use: Both      AM-PAC:  24/24; Current research shows that an AM-PAC score of 17 or less is typically not associated with a discharge to the patient's home setting, whereas a score of 18 or greater is typically associated with a discharge to the patient's home setting. Today's TX:   Pt is able to ambulate with MOD (I)  with a RW. He is able to navigate stairs with MOD (I). He is educated on a HEP and states understanding.    Pain:  Pain level pre-treatment: 0/10   Pain level post-treatment: 1/10  Pain Location: R knee  Pain Intervention(s): Medication (see MAR); Rest, Ice, Repositioning   Response to intervention: Nurse notified, See doc flow    Activity Tolerance:   Excellent  Please refer to the flowsheet for vital signs taken during this treatment. After treatment:   []         Patient left in no apparent distress sitting up in chair  [x]         Patient left in no apparent distress in bed  []         Call bell left within reach  [x]         Nursing notified  []         Caregiver present  []         Bed alarm activated  []         SCDs applied    COMMUNICATION/EDUCATION:   [x]         Role of Physical Therapy in the acute care setting. [x]         Fall prevention education was provided and the patient/caregiver indicated understanding. [x]         Patient/family have participated as able in goal setting and plan of care. [x]         Patient/family agree to work toward stated goals and plan of care. []         Patient understands intent and goals of therapy, but is neutral about his/her participation. []         Patient is unable to participate in goal setting/plan of care: ongoing with therapy staff.  []         Other:     Thank you for this referral.  Anastasiya Bullock, PT, DPT   Time Calculation: 38 mins

## 2022-08-17 NOTE — Clinical Note
Patient Class[de-identified] OBSERVATION [104]   Type of Bed: Telemetry [19]   Cardiac Monitoring Required?: Yes   Reason for Observation: post-op bleeding   Admitting Diagnosis: Post-operative complication [[id number]]   Admitting Physician: Jose Armando Hill [[id number]]   Attending Physician: Jose Armando Hill [[id number]]

## 2022-08-17 NOTE — ANESTHESIA PREPROCEDURE EVALUATION
Relevant Problems   No relevant active problems       Anesthetic History   No history of anesthetic complications            Review of Systems / Medical History  Patient summary reviewed, nursing notes reviewed and pertinent labs reviewed    Pulmonary  Within defined limits                 Neuro/Psych         Headaches     Cardiovascular    Hypertension        Dysrhythmias : atrial fibrillation  Hyperlipidemia    Exercise tolerance: >4 METS  Comments: eliquis   GI/Hepatic/Renal  Within defined limits              Endo/Other        Morbid obesity and arthritis    Comments: Hyperparathyroid Other Findings              Physical Exam    Airway  Mallampati: III  TM Distance: 4 - 6 cm  Neck ROM: normal range of motion   Mouth opening: Normal     Cardiovascular    Rhythm: irregular  Rate: normal         Dental  No notable dental hx       Pulmonary  Breath sounds clear to auscultation               Abdominal  GI exam deferred       Other Findings            Anesthetic Plan    ASA: 3  Anesthesia type: regional and spinal - saphenous block      Post-op pain plan if not by surgeon: peripheral nerve block single    Induction: Intravenous  Anesthetic plan and risks discussed with: Patient

## 2022-08-18 ENCOUNTER — APPOINTMENT (OUTPATIENT)
Dept: PHYSICAL THERAPY | Age: 72
End: 2022-08-18

## 2022-08-18 VITALS
TEMPERATURE: 98.4 F | OXYGEN SATURATION: 97 % | RESPIRATION RATE: 15 BRPM | DIASTOLIC BLOOD PRESSURE: 67 MMHG | HEIGHT: 76 IN | SYSTOLIC BLOOD PRESSURE: 116 MMHG | BODY MASS INDEX: 37.63 KG/M2 | HEART RATE: 96 BPM | WEIGHT: 309 LBS

## 2022-08-18 PROBLEM — I48.20 CHRONIC ATRIAL FIBRILLATION (HCC): Status: ACTIVE | Noted: 2022-08-18

## 2022-08-18 LAB
ALBUMIN SERPL-MCNC: 3.2 G/DL (ref 3.4–5)
ALBUMIN/GLOB SERPL: 1.2 {RATIO} (ref 0.8–1.7)
ALP SERPL-CCNC: 90 U/L (ref 45–117)
ALT SERPL-CCNC: 21 U/L (ref 16–61)
ANION GAP SERPL CALC-SCNC: 8 MMOL/L (ref 3–18)
AST SERPL W P-5'-P-CCNC: 19 U/L (ref 10–38)
BASOPHILS # BLD: 0 K/UL (ref 0–0.1)
BASOPHILS NFR BLD: 0 % (ref 0–2)
BILIRUB SERPL-MCNC: 0.6 MG/DL (ref 0.2–1)
BUN SERPL-MCNC: 18 MG/DL (ref 7–18)
BUN/CREAT SERPL: 17 (ref 12–20)
CA-I BLD-MCNC: 8.5 MG/DL (ref 8.5–10.1)
CHLORIDE SERPL-SCNC: 104 MMOL/L (ref 100–111)
CO2 SERPL-SCNC: 27 MMOL/L (ref 21–32)
CREAT SERPL-MCNC: 1.07 MG/DL (ref 0.6–1.3)
DIFFERENTIAL METHOD BLD: ABNORMAL
EOSINOPHIL # BLD: 0 K/UL (ref 0–0.4)
EOSINOPHIL NFR BLD: 0 % (ref 0–5)
ERYTHROCYTE [DISTWIDTH] IN BLOOD BY AUTOMATED COUNT: 13.5 % (ref 11.6–14.5)
GLOBULIN SER CALC-MCNC: 2.6 G/DL (ref 2–4)
GLUCOSE SERPL-MCNC: 166 MG/DL (ref 74–99)
HCT VFR BLD AUTO: 37.3 % (ref 36–48)
HGB BLD-MCNC: 12.7 G/DL (ref 13–16)
IMM GRANULOCYTES # BLD AUTO: 0 K/UL (ref 0–0.04)
IMM GRANULOCYTES NFR BLD AUTO: 0 % (ref 0–0.5)
LYMPHOCYTES # BLD: 1 K/UL (ref 0.9–3.6)
LYMPHOCYTES NFR BLD: 11 % (ref 21–52)
MCH RBC QN AUTO: 32.7 PG (ref 24–34)
MCHC RBC AUTO-ENTMCNC: 34 G/DL (ref 31–37)
MCV RBC AUTO: 96.1 FL (ref 78–100)
MONOCYTES # BLD: 0.7 K/UL (ref 0.05–1.2)
MONOCYTES NFR BLD: 7 % (ref 3–10)
NEUTS SEG # BLD: 7.8 K/UL (ref 1.8–8)
NEUTS SEG NFR BLD: 82 % (ref 40–73)
NRBC # BLD: 0 K/UL (ref 0–0.01)
NRBC BLD-RTO: 0 PER 100 WBC
PLATELET # BLD AUTO: 121 K/UL (ref 135–420)
PMV BLD AUTO: 11.2 FL (ref 9.2–11.8)
POTASSIUM SERPL-SCNC: 4.5 MMOL/L (ref 3.5–5.5)
PROT SERPL-MCNC: 5.8 G/DL (ref 6.4–8.2)
RBC # BLD AUTO: 3.88 M/UL (ref 4.35–5.65)
SODIUM SERPL-SCNC: 139 MMOL/L (ref 136–145)
WBC # BLD AUTO: 9.6 K/UL (ref 4.6–13.2)

## 2022-08-18 PROCEDURE — G0378 HOSPITAL OBSERVATION PER HR: HCPCS

## 2022-08-18 PROCEDURE — 74011250637 HC RX REV CODE- 250/637: Performed by: NURSE PRACTITIONER

## 2022-08-18 PROCEDURE — 74011250636 HC RX REV CODE- 250/636: Performed by: NURSE PRACTITIONER

## 2022-08-18 PROCEDURE — 36415 COLL VENOUS BLD VENIPUNCTURE: CPT

## 2022-08-18 PROCEDURE — 74011000250 HC RX REV CODE- 250: Performed by: NURSE PRACTITIONER

## 2022-08-18 PROCEDURE — 85025 COMPLETE CBC W/AUTO DIFF WBC: CPT

## 2022-08-18 PROCEDURE — 80053 COMPREHEN METABOLIC PANEL: CPT

## 2022-08-18 PROCEDURE — 97116 GAIT TRAINING THERAPY: CPT

## 2022-08-18 PROCEDURE — 97161 PT EVAL LOW COMPLEX 20 MIN: CPT

## 2022-08-18 RX ORDER — DILTIAZEM HYDROCHLORIDE 240 MG/1
240 CAPSULE, COATED, EXTENDED RELEASE ORAL DAILY
Status: DISCONTINUED | OUTPATIENT
Start: 2022-08-18 | End: 2022-08-18 | Stop reason: HOSPADM

## 2022-08-18 RX ORDER — PANTOPRAZOLE SODIUM 40 MG/1
40 TABLET, DELAYED RELEASE ORAL
Status: DISCONTINUED | OUTPATIENT
Start: 2022-08-18 | End: 2022-08-18 | Stop reason: HOSPADM

## 2022-08-18 RX ORDER — CARVEDILOL 6.25 MG/1
6.25 TABLET ORAL 2 TIMES DAILY WITH MEALS
Status: DISCONTINUED | OUTPATIENT
Start: 2022-08-18 | End: 2022-08-18 | Stop reason: HOSPADM

## 2022-08-18 RX ORDER — LANOLIN ALCOHOL/MO/W.PET/CERES
1 CREAM (GRAM) TOPICAL
Status: DISCONTINUED | OUTPATIENT
Start: 2022-08-18 | End: 2022-08-18 | Stop reason: HOSPADM

## 2022-08-18 RX ORDER — OXYCODONE AND ACETAMINOPHEN 5; 325 MG/1; MG/1
1 TABLET ORAL
Status: DISCONTINUED | OUTPATIENT
Start: 2022-08-18 | End: 2022-08-18 | Stop reason: HOSPADM

## 2022-08-18 RX ORDER — SODIUM CHLORIDE 9 MG/ML
75 INJECTION, SOLUTION INTRAVENOUS CONTINUOUS
Status: DISCONTINUED | OUTPATIENT
Start: 2022-08-18 | End: 2022-08-18

## 2022-08-18 RX ORDER — MELATONIN
1000 DAILY
Status: DISCONTINUED | OUTPATIENT
Start: 2022-08-18 | End: 2022-08-18 | Stop reason: HOSPADM

## 2022-08-18 RX ORDER — FINASTERIDE 5 MG/1
5 TABLET, FILM COATED ORAL DAILY
Status: DISCONTINUED | OUTPATIENT
Start: 2022-08-18 | End: 2022-08-18 | Stop reason: HOSPADM

## 2022-08-18 RX ADMIN — PANTOPRAZOLE SODIUM 40 MG: 40 TABLET, DELAYED RELEASE ORAL at 06:31

## 2022-08-18 RX ADMIN — SODIUM CHLORIDE 75 ML/HR: 9 INJECTION, SOLUTION INTRAVENOUS at 01:05

## 2022-08-18 RX ADMIN — FINASTERIDE 5 MG: 5 TABLET, FILM COATED ORAL at 08:39

## 2022-08-18 RX ADMIN — FERROUS SULFATE TAB 325 MG (65 MG ELEMENTAL FE) 325 MG: 325 (65 FE) TAB at 08:37

## 2022-08-18 RX ADMIN — OXYCODONE AND ACETAMINOPHEN 1 TABLET: 5; 325 TABLET ORAL at 04:00

## 2022-08-18 RX ADMIN — SODIUM CHLORIDE, PRESERVATIVE FREE 10 ML: 5 INJECTION INTRAVENOUS at 05:44

## 2022-08-18 RX ADMIN — OXYCODONE AND ACETAMINOPHEN 1 TABLET: 5; 325 TABLET ORAL at 12:40

## 2022-08-18 RX ADMIN — DILTIAZEM HYDROCHLORIDE 240 MG: 240 CAPSULE, COATED, EXTENDED RELEASE ORAL at 08:37

## 2022-08-18 RX ADMIN — Medication 1000 UNITS: at 08:39

## 2022-08-18 RX ADMIN — CARVEDILOL 6.25 MG: 6.25 TABLET, FILM COATED ORAL at 08:38

## 2022-08-18 NOTE — DISCHARGE SUMMARY
Physician Discharge Summary       Patient: Marilyn Pop MRN: [id number]     YOB: 1950  Age: 71 y.o. Sex: male    PCP: Joshua Negron MD    Allergies: Patient has no known allergies. Admit date: 8/17/2022  Admitting Provider: Sylvester Cantor MD    Discharge date: 8/18/2022  Discharging Provider: Priscilla Dutton NP    Admission Diagnoses:   Post-operative complication [G05. 9XXA]    Discharge Diagnoses:    Hospital Problems as of 8/18/2022 Date Reviewed: 8/8/2022            Codes Class Noted - Resolved POA    Chronic atrial fibrillation ([facility] ICD-10-CM: I48.20  ICD-9-CM: 427.31  8/18/2022 - Present Unknown        Post-operative complication IFN-45-HZ: F56. 9XXA  ICD-9-CM: 998.9  8/17/2022 - Present Unknown           Hospital Course: Marilyn Pop is a 71 y.o. male  has a past medical history of Chronic atrial fibrillation ([facility] Chronic diarrhea, HLD (hyperlipidemia), HTN (hypertension), Hyperparathyroidism ([facility] Impaired glucose tolerance, Obesity, and Pleural effusion. Patient came to the emergency room tonight after talking with orthopedics on call. Patient had a total knee replacement of the right knee with Dr. Junior Grewal same day and he had postoperative bleeding. Patient was informed to come to the emergency room by Dr. Roseann Downs office. Emergency room doctor place a dressing and an Ace wrap over top of knee leg is elevated. Emergency room doctor talked with Dr. Junior Grewal and he asked for him to be admitted by hospitalist and he will take care of the dressing changes to the right knee. At this time patient's pain is controlled he is awake alert pleasant no distress. Labs completed CBC H&H within normal limits coags within normal limits. Anticoagulation will be held at this time. Patient experienced episode of bleeding today where he saturated surgical dressing. Bleeding now under control. H/H WNL on morning labs.  Nurse discussed with ortho surgery, okay to D/C patient today.      * No surgery found *      Consults:      Review of Systems   All other systems reviewed and are negative. Discharge Exam:  Same as PE in progress note from today. Discharge Condition: stable  Disposition: Home    Discharge Medications:  Current Discharge Medication List        CONTINUE these medications which have NOT CHANGED    Details   prednisoLONE acetate (PRED FORTE) 1 % ophthalmic suspension prednisolone acetate 1 % eye drops,suspension   instill 1 drop four times a day IN OPERATIVE EYE STARTING 1 DAY P...  (REFER TO PRESCRIPTION NOTES). tobramycin (TOBREX) 0.3 % ophthalmic solution       tropicamide (mydRIACYL) 1 % ophthalmic solution       oxyCODONE-acetaminophen (Percocet) 5-325 mg per tablet Take 1 Tablet by mouth every four to six (4-6) hours as needed for Pain for up to 14 days. Max Daily Amount: 6 Tablets. Qty: 30 Tablet, Refills: 0    Comments: Do no take until after surgery  Associated Diagnoses: Pain in both knees, unspecified chronicity; Osteoarthritis of both knees, unspecified osteoarthritis type      ondansetron (ZOFRAN ODT) 4 mg disintegrating tablet Take 1 Tablet by mouth every eight (8) hours as needed for Nausea, Vomiting or Nausea or Vomiting for up to 20 doses. Qty: 20 Tablet, Refills: 0      apixaban (ELIQUIS) 5 mg tablet Take 5 mg by mouth two (2) times a day. acetaminophen 325 mg cap Tylenol 325 mg capsule   PRN      pantoprazole (PROTONIX) 40 mg tablet       finasteride (PROSCAR) 5 mg tablet TAKE 1 TABLET DAILY  Qty: 90 Tab, Refills: 3      carvedilol (COREG) 6.25 mg tablet Take  by mouth two (2) times daily (with meals). docusate sodium (COLACE) 100 mg capsule Take 100 mg by mouth. cholecalciferol (VITAMIN D3) 25 mcg (1,000 unit) cap Take  by mouth daily. ferrous sulfate 325 mg (65 mg iron) tablet Take  by mouth Daily (before breakfast).       dilTIAZem CD (CARDIZEM CD) 240 mg ER capsule              Follow-up Care/Patient Instructions: Follow-up with orthopedic surgery as scheduled  Follow Dr. Kady Kumar instructions for taking Eliquis  Activity: See surgical instructions  Diet: Regular Diet      Follow-up Information       Follow up With Specialties Details Why Contact Info    Concepción Zhu MD Internal Medicine Physician   [id number] [phone]              Total time in minutes spent coordinating this discharge, going over instructions, follow-up, prescriptions, and documentation  35 minutes    Signed: Yulia Mahoney. Tatiana Stout, MSN, [address] [facility],[address] [place] [address]    8/18/2022  1:53 PM        Dragon voice recognition was used to generate this report, which may have resulted in some phonetic based errors in grammar and contents.  Even though attempts were made to correct all the mistakes, some may have been missed, and remained in the body of the document

## 2022-08-18 NOTE — PROGRESS NOTES
Received report via walking rounds at bedside, pt awake  at this time using  urinual.denies any discomfort at this time.

## 2022-08-18 NOTE — PROGRESS NOTES
Subjective:     Post-Operative Day: 1 Status Post right Total Knee Arthroplasty  Systemic or Specific Complaints:Postop bleeding secondary to Eliquis    Objective:     Patient Vitals for the past 24 hrs:   BP Temp Pulse Resp SpO2 Height Weight   08/18/22 0837 116/67 -- 92 -- -- -- --   08/18/22 0400 119/65 98 °F (36.7 °C) 84 15 97 % -- --   08/18/22 0002 (!) 140/81 97.4 °F (36.3 °C) 91 15 96 % -- --   08/17/22 2358 -- -- -- -- -- -- 309 lb (140.2 kg)   08/17/22 2329 136/68 -- 80 18 98 % -- --   08/17/22 2309 123/64 -- 84 18 95 % -- --   08/17/22 2030 (!) 141/85 98.3 °F (36.8 °C) (!) 116 20 98 % 6' 4\" (1.93 m) 295 lb (133.8 kg)       General: alert, cooperative, no distress, appears stated age   Wound: Dressing dry and intact this AM     Data Review:    Recent Results (from the past 24 hour(s))   CBC WITH AUTOMATED DIFF    Collection Time: 08/17/22  8:50 PM   Result Value Ref Range    WBC 10.7 4.6 - 13.2 K/uL    RBC 4.33 (L) 4.35 - 5.65 M/uL    HGB 13.9 13.0 - 16.0 g/dL    HCT 42.0 36.0 - 48.0 %    MCV 97.0 78.0 - 100.0 FL    MCH 32.1 24.0 - 34.0 PG    MCHC 33.1 31.0 - 37.0 g/dL    RDW 13.5 11.6 - 14.5 %    PLATELET 093 801 - 193 K/uL    MPV 10.6 9.2 - 11.8 FL    NRBC 0.0 0.0  WBC    ABSOLUTE NRBC 0.00 0.00 - 0.01 K/uL    NEUTROPHILS 84 (H) 40 - 73 %    LYMPHOCYTES 9 (L) 21 - 52 %    MONOCYTES 6 3 - 10 %    EOSINOPHILS 0 0 - 5 %    BASOPHILS 0 0 - 2 %    IMMATURE GRANULOCYTES 1 (H) 0 - 0.5 %    ABS. NEUTROPHILS 9.1 (H) 1.8 - 8.0 K/UL    ABS. LYMPHOCYTES 1.0 0.9 - 3.6 K/UL    ABS. MONOCYTES 0.6 0.05 - 1.2 K/UL    ABS. EOSINOPHILS 0.0 0.0 - 0.4 K/UL    ABS. BASOPHILS 0.0 0.0 - 0.1 K/UL    ABS. IMM.  GRANS. 0.1 (H) 0.00 - 0.04 K/UL    DF AUTOMATED     TYPE & SCREEN    Collection Time: 08/17/22  8:50 PM   Result Value Ref Range    Crossmatch Expiration 08/20/2022,2359     ABO/Rh(D) A Positive     Antibody screen Negative    PROTHROMBIN TIME + INR    Collection Time: 08/17/22  8:50 PM   Result Value Ref Range Prothrombin time 14.9 11.5 - 15.2 sec    INR 1.1 0.8 - 1.2     METABOLIC PANEL, BASIC    Collection Time: 08/17/22  8:50 PM   Result Value Ref Range    Sodium 139 136 - 145 mmol/L    Potassium 4.6 3.5 - 5.5 mmol/L    Chloride 104 100 - 111 mmol/L    CO2 26 21 - 32 mmol/L    Anion gap 9 3.0 - 18.0 mmol/L    Glucose 139 (H) 74 - 99 mg/dL    BUN 16 7 - 18 mg/dL    Creatinine 1.43 (H) 0.60 - 1.30 mg/dL    BUN/Creatinine ratio 11 (L) 12 - 20      GFR est AA 59 (L) >60 ml/min/1.73m2    GFR est non-AA 49 (L) >60 ml/min/1.73m2    Calcium 8.8 8.5 - 76.0 mg/dL   METABOLIC PANEL, COMPREHENSIVE    Collection Time: 08/18/22  3:18 AM   Result Value Ref Range    Sodium 139 136 - 145 mmol/L    Potassium 4.5 3.5 - 5.5 mmol/L    Chloride 104 100 - 111 mmol/L    CO2 27 21 - 32 mmol/L    Anion gap 8 3.0 - 18.0 mmol/L    Glucose 166 (H) 74 - 99 mg/dL    BUN 18 7 - 18 mg/dL    Creatinine 1.07 0.60 - 1.30 mg/dL    BUN/Creatinine ratio 17 12 - 20      GFR est AA >60 >60 ml/min/1.73m2    GFR est non-AA >60 >60 ml/min/1.73m2    Calcium 8.5 8.5 - 10.1 mg/dL    Bilirubin, total 0.6 0.2 - 1.0 mg/dL    AST (SGOT) 19 10 - 38 U/L    ALT (SGPT) 21 16 - 61 U/L    Alk. phosphatase 90 45 - 117 U/L    Protein, total 5.8 (L) 6.4 - 8.2 g/dL    Albumin 3.2 (L) 3.4 - 5.0 g/dL    Globulin 2.6 2.0 - 4.0 g/dL    A-G Ratio 1.2 0.8 - 1.7     CBC WITH AUTOMATED DIFF    Collection Time: 08/18/22  3:18 AM   Result Value Ref Range    WBC 9.6 4.6 - 13.2 K/uL    RBC 3.88 (L) 4.35 - 5.65 M/uL    HGB 12.7 (L) 13.0 - 16.0 g/dL    HCT 37.3 36.0 - 48.0 %    MCV 96.1 78.0 - 100.0 FL    MCH 32.7 24.0 - 34.0 PG    MCHC 34.0 31.0 - 37.0 g/dL    RDW 13.5 11.6 - 14.5 %    PLATELET 318 (L) 365 - 420 K/uL    MPV 11.2 9.2 - 11.8 FL    NRBC 0.0 0.0  WBC    ABSOLUTE NRBC 0.00 0.00 - 0.01 K/uL    NEUTROPHILS 82 (H) 40 - 73 %    LYMPHOCYTES 11 (L) 21 - 52 %    MONOCYTES 7 3 - 10 %    EOSINOPHILS 0 0 - 5 %    BASOPHILS 0 0 - 2 %    IMMATURE GRANULOCYTES 0 0 - 0.5 %    ABS. NEUTROPHILS 7.8 1.8 - 8.0 K/UL    ABS. LYMPHOCYTES 1.0 0.9 - 3.6 K/UL    ABS. MONOCYTES 0.7 0.05 - 1.2 K/UL    ABS. EOSINOPHILS 0.0 0.0 - 0.4 K/UL    ABS. BASOPHILS 0.0 0.0 - 0.1 K/UL    ABS. IMM. GRANS. 0.0 0.00 - 0.04 K/UL    DF AUTOMATED           Assessment:     Status Post right Total Knee Arthroplasty. Postop bleeding     Plan: May DC knee immobilizer. May DC to home today if no further bleeding when up and walking. Follow up in clinic next week.

## 2022-08-18 NOTE — PROGRESS NOTES
@8831 TRANSFER - IN REPORT:    Verbal report received from Shelly Mccullough RN(name) on Marietta Carnes  being received from ED(unit) for routine progression of care      Report consisted of patients Situation, Background, Assessment and   Recommendations(SBAR). Information from the following report(s) ED Summary, MAR, and Recent Results was reviewed with the receiving nurse. Opportunity for questions and clarification was provided. Assessment completed upon patients arrival to unit and care assumed. @7840 Received care of pt  via stretcher from ED. Pt transferred self to bed. A&OX4. Lungs clear, SATS 96% on RA. Skin warm and dry. Dressing to right knee intact. Pt also has knee immobilizer in place. +1 edema to RLE. Pt stated, \"my foot is numb from my ankle to my foot. \"  AAMIR hose to LLE intact . Pt has own home medication in personal container. Pt asking to keep home medication at bedside. Pt oriented to call bell system and bed control. @8764  into draw am labs. @4296 Dressing to right knee dry and intact. Knee immobilizer intact. Pt stated, \"I now have feeling to my right foot. \" CBWR, bed in lowest position. @2772 Bedside shift report given to oncoming nurse.

## 2022-08-18 NOTE — PROGRESS NOTES
08/18/22 0400   Pain 1   Pain Scale 1 Numeric (0 - 10)   Pain Intensity 1 5   Pain Location 1 Knee  (right)   Pain Orientation 1 Anterior   Pain Description 1 Throbbing   Pain Intervention(s) 1 Medication (see MAR); Position     1 percocet po given.

## 2022-08-18 NOTE — PROGRESS NOTES
Hospitalist Progress Note             Date of Service:  2022  NAME:  Oneil Gottron  :  1950  MRN:  [id number]    Oneil Gottron is a 71 y.o. male  has a past medical history of Chronic atrial fibrillation ([address] Chronic diarrhea, HLD (hyperlipidemia), HTN (hypertension), Hyperparathyroidism ([address] Impaired glucose tolerance, Obesity, and Pleural effusion. Patient seen at bedside in emergency department. Patient came to the emergency room tonight after talking with orthopedics on call. Patient had a total knee replacement of the right knee with Dr. Osvaldo Bustillo today and he had postoperative bleeding. Patient was informed to come to the emergency room by Dr. Justine Khan office. Emergency room doctor place a dressing and an Ace wrap over top of knee leg is elevated. Emergency room doctor talked with Dr. Osvaldo Bustillo and he asked for him to be admitted by hospitalist and he will take care of the dressing changes to the right knee. At this time patient's pain is controlled he is awake alert pleasant no distress. Labs completed CBC H&H within normal limits coags within normal limits. Anticoagulation will be held at this time     Assessment & Plan:         Post-operative complication  Status post only 817  Dr. Osvaldo Bustillo will see patient in hospital  Dressing and bandage applied by ED no active bleeding past bandage  A.m. labs to check H&H, coags  Elevate right leg  IV fluid at 75/h     Chronic atrial fibrillation ([address]  This is a chronic problem  Continue Cardizem, Coreg, Eliquis           Code Status: Full        Prophylaxis: Hold anticoagulation at this time    Pt still bleeding with soaked dressings  I am ok with continuing to hold eliquis, with indication afib, for a few more days if needed.       Hospital Problems  Date Reviewed: 2022            Codes Class Noted POA    Chronic atrial fibrillation ([address] ICD-10-CM: I48.20  ICD-9-CM: 427.31  8/18/2022 Unknown        Post-operative complication AKM-91-ZA: G65. 9XXA  ICD-9-CM: 998.9  8/17/2022 Unknown             Review of Systems:   A comprehensive review of systems was negative except for that written in the HPI. Vital Signs:    Last 24hrs VS reviewed since prior progress note. Most recent are:  Visit Vitals  /67   Pulse 92   Temp 98 °F (36.7 °C)   Resp 15   Ht 6' 4\" (1.93 m)   Wt 140.2 kg (309 lb)   SpO2 97%   BMI 37.61 kg/m²         Intake/Output Summary (Last 24 hours) at 8/18/2022 1307  Last data filed at 8/18/2022 1048  Gross per 24 hour   Intake 856.25 ml   Output 700 ml   Net 156.25 ml        Physical Examination:       Physical Exam  Vitals and nursing note reviewed. Constitutional:       General: He is not in acute distress. Appearance: He is well-developed. He is ill-appearing. HENT:      Head: Normocephalic and atraumatic. Eyes:      Conjunctiva/sclera: Conjunctivae normal.      Pupils: Pupils are equal, round, and reactive to light. Cardiovascular:      Rate and Rhythm: Normal rate and regular rhythm. Pulmonary:      Effort: Pulmonary effort is normal. No respiratory distress. Breath sounds: Normal breath sounds. No stridor. No wheezing or rhonchi. Abdominal:      General: Bowel sounds are normal. There is no distension. Palpations: Abdomen is soft. Tenderness: There is no abdominal tenderness. Musculoskeletal:         General: Swelling and tenderness present. Normal range of motion. Cervical back: Normal range of motion and neck supple. Right lower leg: No edema. Left lower leg: No edema. Comments: Minor swelling noted to the right knee and lower leg normal distal circulation and sensation dressing and Ace wrap applied to right knee/leg   Skin:     General: Skin is warm and dry. Neurological:      Mental Status: He is alert and oriented to person, place, and time.    Psychiatric:         Behavior: Behavior normal.         Thought Content: Thought content normal.         Judgment: Judgment normal.        Data Review:    Review and/or order of clinical lab test  Review and/or order of tests in the radiology section of CPT  Review and/or order of tests in the medicine section of CPT      Labs:     Recent Labs     08/18/22 0318 08/17/22 2050   WBC 9.6 10.7   HGB 12.7* 13.9   HCT 37.3 42.0   * 137     Recent Labs     08/18/22 0318 08/17/22 2050    139   K 4.5 4.6    104   CO2 27 26   BUN 18 16   CREA 1.07 1.43*   * 139*   CA 8.5 8.8     Recent Labs     08/18/22 0318   ALT 21   AP 90   TBILI 0.6   TP 5.8*   ALB 3.2*   GLOB 2.6     Recent Labs     08/17/22 2050   INR 1.1   PTP 14.9      No results for input(s): FE, TIBC, PSAT, FERR in the last 72 hours. No results found for: FOL, RBCF   No results for input(s): PH, PCO2, PO2 in the last 72 hours. No results for input(s): CPK, CKNDX, TROIQ in the last 72 hours.     No lab exists for component: CPKMB  No results found for: CHOL, CHOLX, CHLST, CHOLV, HDL, HDLP, LDL, LDLC, DLDLP, TGLX, TRIGL, TRIGP, CHHD, CHHDX  No results found for: [facility]  Lab Results   Component Value Date/Time    Color Yellow 03/16/2020 02:38 PM    Appearance Clear 03/16/2020 02:38 PM    pH (UA) 5.5 03/16/2020 02:38 PM    Ketone Negative 03/16/2020 02:38 PM    Bilirubin Negative 03/16/2020 02:38 PM    Nitrites Negative 03/16/2020 02:38 PM    Leukocyte Esterase Negative 03/16/2020 02:38 PM    Bacteria Few 03/16/2020 02:38 PM    WBC 0-5 03/16/2020 02:38 PM    RBC None seen 03/16/2020 02:38 PM         Medications Reviewed:     Current Facility-Administered Medications   Medication Dose Route Frequency    carvediloL (COREG) tablet 6.25 mg  6.25 mg Oral BID WITH MEALS    cholecalciferol (VITAMIN D3) (1000 Units /25 mcg) tablet 1,000 Units  1,000 Units Oral DAILY    dilTIAZem ER (CARDIZEM CD) capsule 240 mg  240 mg Oral DAILY    ferrous sulfate tablet 325 mg  1 Tablet Oral DAILY WITH BREAKFAST    finasteride (PROSCAR) tablet 5 mg  5 mg Oral DAILY    oxyCODONE-acetaminophen (PERCOCET) 5-325 mg per tablet 1 Tablet  1 Tablet Oral Q4H PRN    pantoprazole (PROTONIX) tablet 40 mg  40 mg Oral ACB    0.9% sodium chloride infusion  75 mL/hr IntraVENous CONTINUOUS    sodium chloride (NS) flush 5-40 mL  5-40 mL IntraVENous Q8H    sodium chloride (NS) flush 5-40 mL  5-40 mL IntraVENous PRN    acetaminophen (TYLENOL) tablet 650 mg  650 mg Oral Q6H PRN    Or    acetaminophen (TYLENOL) suppository 650 mg  650 mg Rectal Q6H PRN    polyethylene glycol (MIRALAX) packet 17 g  17 g Oral DAILY PRN    ondansetron (ZOFRAN ODT) tablet 4 mg  4 mg Oral Q8H PRN    Or    ondansetron (ZOFRAN) injection 4 mg  4 mg IntraVENous Q6H PRN     ______________________________________________________________________  EXPECTED LENGTH OF STAY: - - -  ACTUAL LENGTH OF STAY:          0                 Vito Morris MD

## 2022-08-18 NOTE — PROGRESS NOTES
TRANSFER - OUT REPORT:    Verbal report given to [facility] RN(name) on [facility]  being transferred to icu(unit) for routine progression of care       Report consisted of patients Situation, Background, Assessment and   Recommendations(SBAR). Information from the following report(s) SBAR, ED Summary, and MAR was reviewed with the receiving nurse. Lines:       Opportunity for questions and clarification was provided.       Patient transported with:   Monitor  Tech

## 2022-08-18 NOTE — PROGRESS NOTES
Problem: Falls - Risk of  Goal: *Absence of Falls  Description: Document Thiago Cease Fall Risk and appropriate interventions in the flowsheet. Outcome: Resolved/Met     Problem: Patient Education: Go to Patient Education Activity  Goal: Patient/Family Education  Outcome: Resolved/Met     Problem: Pressure Injury - Risk of  Goal: *Prevention of pressure injury  Description: Document Layton Scale and appropriate interventions in the flowsheet.   Outcome: Resolved/Met     Problem: Patient Education: Go to Patient Education Activity  Goal: Patient/Family Education  Outcome: Resolved/Met     Problem: Patient Education: Go to Patient Education Activity  Goal: Patient/Family Education  Outcome: Resolved/Met     Problem: Knee Replacement: Day of Surgery/Unit  Goal: Off Pathway (Use only if patient is Off Pathway)  Outcome: Resolved/Met  Goal: Activity/Safety  Outcome: Resolved/Met  Goal: Consults, if ordered  Outcome: Resolved/Met  Goal: Diagnostic Test/Procedures  Outcome: Resolved/Met  Goal: Nutrition/Diet  Outcome: Resolved/Met  Goal: Medications  Outcome: Resolved/Met  Goal: Respiratory  Outcome: Resolved/Met  Goal: Treatments/Interventions/Procedures  Outcome: Resolved/Met  Goal: Psychosocial  Outcome: Resolved/Met  Goal: *Initiate mobility  Outcome: Resolved/Met  Goal: *Optimal pain control at patient's stated goal  Outcome: Resolved/Met  Goal: *Hemodynamically stable  Outcome: Resolved/Met     Problem: Knee Replacement: Post-Op Day 1  Goal: Off Pathway (Use only if patient is Off Pathway)  Outcome: Resolved/Met  Goal: Activity/Safety  Outcome: Resolved/Met  Goal: Diagnostic Test/Procedures  Outcome: Resolved/Met  Goal: Nutrition/Diet  Outcome: Resolved/Met  Goal: Medications  Outcome: Resolved/Met  Goal: Respiratory  Outcome: Resolved/Met  Goal: Treatments/Interventions/Procedures  Outcome: Resolved/Met  Goal: Psychosocial  Outcome: Resolved/Met  Goal: Discharge Planning  Outcome: Resolved/Met  Goal: *Demonstrates progressive activity  Outcome: Resolved/Met  Goal: *Optimal pain control at patient's stated goal  Outcome: Resolved/Met  Goal: *Hemodynamically stable  Outcome: Resolved/Met  Goal: *Discharge plan identified  Outcome: Resolved/Met     Problem: Knee Replacement: Post-Op Day 2  Goal: Off Pathway (Use only if patient is Off Pathway)  Outcome: Resolved/Met  Goal: Activity/Safety  Outcome: Resolved/Met  Goal: Diagnostic Test/Procedures  Outcome: Resolved/Met  Goal: Medications  Outcome: Resolved/Met  Goal: Respiratory  Outcome: Resolved/Met  Goal: Treatments/Interventions/Procedures  Outcome: Resolved/Met  Goal: Psychosocial  Outcome: Resolved/Met  Goal: *Met physical therapy criteria for discharge to the next level of care  Outcome: Resolved/Met  Goal: *Optimal pain control with oral analgesia  Outcome: Resolved/Met  Goal: *Hemodynamically stable  Outcome: Resolved/Met  Goal: *Tolerating diet  Outcome: Resolved/Met  Goal: *Patient verbalizes understanding of discharge instructions  Outcome: Resolved/Met     Problem: Pain  Goal: *Control of Pain  Outcome: Resolved/Met  Goal: *PALLIATIVE CARE:  Alleviation of Pain  Outcome: Resolved/Met     Problem: Patient Education: Go to Patient Education Activity  Goal: Patient/Family Education  Outcome: Resolved/Met     Problem: Falls - Risk of  Goal: *Absence of Falls  Description: Document Toyin Fall Risk and appropriate interventions in the flowsheet.   Outcome: Resolved/Met     Problem: Patient Education: Go to Patient Education Activity  Goal: Patient/Family Education  Outcome: Resolved/Met

## 2022-08-18 NOTE — ASSESSMENT & PLAN NOTE
Status post only 80  Dr. Sharyn Villafuerte will see patient in hospital  Dressing and bandage applied by ED no active bleeding past bandage  A.m. labs to check H&H, coags  Elevate right leg  IV fluid at 75/h

## 2022-08-18 NOTE — ED TRIAGE NOTES
Pt arrived via pov for severe bleeding to incision of R tka that was performed at this facility earlier today. Dressing taken down and large amounts of blood noted to dressing and bandage. MD made aware.

## 2022-08-18 NOTE — PROGRESS NOTES
Written and verbal discharge instruction given ,pt voiced he understood. all pt's belongings with him on discharge. Pt discharge with with nephew.

## 2022-08-18 NOTE — PROGRESS NOTES
Problem: Falls - Risk of  Goal: *Absence of Falls  Description: Document Thora Bare Fall Risk and appropriate interventions in the flowsheet. Outcome: Progressing Towards Goal  Note: Fall Risk Interventions:  Mobility Interventions: PT Consult for mobility concerns         Medication Interventions: Teach patient to arise slowly, Patient to call before getting OOB                   Problem: Pressure Injury - Risk of  Goal: *Prevention of pressure injury  Description: Document Layton Scale and appropriate interventions in the flowsheet. Outcome: Progressing Towards Goal  Note: Pressure Injury Interventions:  Sensory Interventions: Keep linens dry and wrinkle-free, Minimize linen layers    Moisture Interventions: Minimize layers    Activity Interventions: PT/OT evaluation    Mobility Interventions: PT/OT evaluation    Nutrition Interventions: Document food/fluid/supplement intake    Friction and Shear Interventions: Minimize layers                Problem: Pain  Goal: *Control of Pain  Outcome: Progressing Towards Goal  Goal: *PALLIATIVE CARE:  Alleviation of Pain  Outcome: Progressing Towards Goal     Problem: Falls - Risk of  Goal: *Absence of Falls  Description: Document Toyin Fall Risk and appropriate interventions in the flowsheet.   Outcome: Progressing Towards Goal  Note: Fall Risk Interventions:  Mobility Interventions: PT Consult for mobility concerns         Medication Interventions: Teach patient to arise slowly, Patient to call before getting OOB

## 2022-08-18 NOTE — PROGRESS NOTES
Problem: Mobility Impaired (Adult and Pediatric)  Goal: *Acute Goals and Plan of Care (Insert Text)  Description: Pt is MOD (I)  with gait and transfers . PLOF: [employer], cane, (I) ADLs. Outcome: Resolved/Met     Problem: Patient Education: Go to Patient Education Activity  Goal: Patient/Family Education  Outcome: Resolved/Met   PHYSICAL THERAPY EVALUATION AND DISCHARGE    Patient: Vesta Gomez (3 y.o. male)  Date: 8/18/2022  Primary Diagnosis: Post-operative complication [Y31. 9XXA]       Precautions:  WBAT    ASSESSMENT :  Based on the objective data described below, the patient presents s/p R TKA yesterday,  with postop bleeding noted,and he is WBAT. He is able to ambulate with a RW with MOD (I) and is educated on how to navigate stairs with a nonreciprocal pattern. He was educated on limiting flexion for a couple days and not forcing the motion to help decrease risk of further bleeding. He can return home with outpatient P.T. Patient does not require further skilled intervention at this level of care. PLAN :  Recommendations and Planned Interventions:   No formal PT needs identified at this time. Discharge Recommendations: Outpatient  Further Equipment Recommendations for Discharge: N/A     SUBJECTIVE:   Patient states I need to ask them when I can restart my eliquis.     OBJECTIVE DATA SUMMARY:     Past Medical History:   Diagnosis Date    Chronic atrial fibrillation (HCC)     Chronic diarrhea     HLD (hyperlipidemia)     HTN (hypertension)     Hyperparathyroidism (HCC)     Impaired glucose tolerance     Obesity     Pleural effusion      Past Surgical History:   Procedure Laterality Date    HX PARATHYROIDECTOMY       Barriers to Learning/Limitations: None  Compensate with: N/A  Home Situation:   Home Situation  Home Environment: Private residence  # Steps to Enter: 5  Rails to Enter: Yes  Hand Rails : Bilateral  One/Two Story Residence: Two story  # of Interior Steps: 15  Height of Each Step (in): 8 inches  Interior Rails: Left  Lift Chair Available: No  Living Alone: Yes  Support Systems: Spouse/Significant Other  Patient Expects to be Discharged to[de-identified] Home with outpatient services  Current DME Used/Available at Home: Walker, rolling, Cane, straight  Critical Behavior:  Neurologic State: Alert  Orientation Level: Oriented X4        Psychosocial  Purposeful Interaction: Yes  Skin Condition/Temp: Warm;Dry     Skin Integrity: Incision (comment) (knee s2xyahtsky intact with dressing on)  Skin Integumentary  Skin Color: Other (comment) (redness)  Skin Condition/Temp: Warm;Dry  Skin Integrity: Incision (comment) (knee [id number] intact with dressing on)     Strength:    Strength: Generally decreased, functional  4/5 R LE  Tone & Sensation:   Tone: Normal     Sensation: Impaired    Range Of Motion:   AROM: Generally decreased, functional (R knee ~ 10-75, educated on limiting flexion some to help decrease risk of further bleeding for a couple days)  PROM: Generally decreased, functional    Posture:  Posture (WDL): Within defined limits    Functional Mobility:  Bed Mobility:  Rolling: Modified independent  Supine to Sit: Modified independent  Sit to Supine: Modified independent  Scooting: Modified independent  Transfers:  Sit to Stand: Modified independent  Stand to Sit: Modified independent          Balance:   Sitting: Intact  Standing: Intact  Ambulation/Gait Training:  Distance (ft): 80 Feet (ft) (x2)  Assistive Device: Walker, rolling  Ambulation - Level of Assistance: Modified independent     Gait Description (WDL): Exceptions to WDL  Gait Abnormalities: Antalgic  Right Side Weight Bearing: As tolerated  Stairs:  Stairs - Level of Assistance: Other (comment) (educated on importance of nonreciprocating pattern to reduce risk of further bleeding.)   AM-PAC:  24/24; Current research shows that an AM-PAC score of 17 or less is typically not associated with a discharge to the patient's home setting, whereas a score of 18 or greater is typically associated with a discharge to the patient's home setting. Today's TX:   Pt is able to ambulate with MOD (I)  with a RW. He is educated on how to navigate stairs. Pain:  Pain level pre-treatment: 5/10   Pain level post-treatment: 5/10  Pain Location: R knee  Pain Intervention(s): Medication (see MAR); Rest, Ice, Repositioning   Response to intervention: Nurse notified, See doc flow    Activity Tolerance:   Good  Please refer to the flowsheet for vital signs taken during this treatment. After treatment:   []         Patient left in no apparent distress sitting up in chair  [x]         Patient left in no apparent distress in bed  [x]         Call bell left within reach  [x]         Nursing notified  []         Caregiver present  []         Bed alarm activated  []         SCDs applied    COMMUNICATION/EDUCATION:   [x]         Role of Physical Therapy in the acute care setting. [x]         Fall prevention education was provided and the patient/caregiver indicated understanding. [x]         Patient/family have participated as able in goal setting and plan of care. [x]         Patient/family agree to work toward stated goals and plan of care. []         Patient understands intent and goals of therapy, but is neutral about his/her participation. []         Patient is unable to participate in goal setting/plan of care: ongoing with therapy staff.  []         Other:     Thank you for this referral.  Lis Sharpe, PT, DPT   Time Calculation: 30 mins

## 2022-08-18 NOTE — PROGRESS NOTES
Care Management Interventions  PCP Verified by CM: Yes (No recent visits to review.)  Palliative Care Criteria Met (RRAT>21 & CHF Dx)?: No  Mode of Transport at Discharge: Self  Transition of Care Consult (CM Consult): Discharge Planning  Physical Therapy Consult: Yes  Support Systems: Spouse/Significant Other  Confirm Follow Up Transport: Self  The Plan for Transition of Care is Related to the Following Treatment Goals : Pt centered discharge planning to ensure a smooth transition to community & PLOF. The Patient and/or Patient Representative was Provided with a Choice of Provider and Agrees with the Discharge Plan?: Yes  Name of the Patient Representative Who was Provided with a Choice of Provider and Agrees with the Discharge Plan: Marci Ramos  Freedom of Choice List was Provided with Basic Dialogue that Supports the Patient's Individualized Plan of Care/Goals, Treatment Preferences and Shares the Quality Data Associated with the Providers?: Yes  Discharge Location  Patient Expects to be Discharged to[de-identified] Home with outpatient services   Discharge planning/Transition of care  RUR N/A  Talked with pt at bedside to discuss POC and discharge needs. Pt states he is ready to go home and has everything that he needs. Waiting for PT to evaluate to see if there are any recommendations.

## 2022-08-18 NOTE — H&P
History and Physical    Subjective:     Willis Martinez is a 71 y.o. male  has a past medical history of Chronic atrial fibrillation ([facility] [address] Chronic diarrhea, HLD (hyperlipidemia), HTN (hypertension), Hyperparathyroidism ([address] Impaired glucose tolerance, Obesity, and Pleural effusion. Patient seen at bedside in emergency department. Patient came to the emergency room tonight after talking with orthopedics on call. Patient had a total knee replacement of the right knee with Dr. Darinel Jimenez today and he had postoperative bleeding. Patient was informed to come to the emergency room by Dr. New Dietz office. Emergency room doctor place a dressing and an Ace wrap over top of knee leg is elevated. Emergency room doctor talked with Dr. Darinel Jimenez and he asked for him to be admitted by hospitalist and he will take care of the dressing changes to the right knee. At this time patient's pain is controlled he is awake alert pleasant no distress. Labs completed CBC H&H within normal limits coags within normal limits. Anticoagulation will be held at this time          Past Medical History:   Diagnosis Date    Chronic atrial fibrillation (HCC)     Chronic diarrhea     HLD (hyperlipidemia)     HTN (hypertension)     Hyperparathyroidism (HCC)     Impaired glucose tolerance     Obesity     Pleural effusion       Past Surgical History:   Procedure Laterality Date    HX PARATHYROIDECTOMY       History reviewed. No pertinent family history. Social History     Tobacco Use    Smoking status: Never    Smokeless tobacco: Never   Substance Use Topics    Alcohol use: No       Prior to Admission medications    Medication Sig Start Date End Date Taking? Authorizing Provider   prednisoLONE acetate (PRED FORTE) 1 % ophthalmic suspension prednisolone acetate 1 % eye drops,suspension   instill 1 drop four times a day IN OPERATIVE EYE STARTING 1 DAY P...  (REFER TO PRESCRIPTION NOTES).  4/1/22   Provider, Historical   tobramycin (TOBREX) 0.3 % ophthalmic solution  4/1/22   Provider, Historical   tropicamide (mydRIACYL) 1 % ophthalmic solution  4/1/22   Provider, Historical   oxyCODONE-acetaminophen (Percocet) 5-325 mg per tablet Take 1 Tablet by mouth every four to six (4-6) hours as needed for Pain for up to 14 days. Max Daily Amount: 6 Tablets. 8/4/22 8/18/22  SAAD Rasheed   ondansetron (ZOFRAN ODT) 4 mg disintegrating tablet Take 1 Tablet by mouth every eight (8) hours as needed for Nausea, Vomiting or Nausea or Vomiting for up to 20 doses. 8/4/22   SAAD Rasheed   apixaban (ELIQUIS) 5 mg tablet Take 5 mg by mouth two (2) times a day. Provider, Historical   acetaminophen 325 mg cap Tylenol 325 mg capsule   PRN    Provider, Historical   pantoprazole (PROTONIX) 40 mg tablet  3/7/22   Provider, Historical   finasteride (PROSCAR) 5 mg tablet TAKE 1 TABLET DAILY 3/16/20   Jessica Torres MD   carvedilol (COREG) 6.25 mg tablet Take  by mouth two (2) times daily (with meals). Provider, Historical   docusate sodium (COLACE) 100 mg capsule Take 100 mg by mouth. 10/6/17   Provider, Historical   cholecalciferol (VITAMIN D3) 25 mcg (1,000 unit) cap Take  by mouth daily. Provider, Historical   ferrous sulfate 325 mg (65 mg iron) tablet Take  by mouth Daily (before breakfast). Provider, Historical   dilTIAZem CD (CARDIZEM CD) 240 mg ER capsule  6/25/18   Provider, Historical     No Known Allergies      Review of Systems   Constitutional:  Negative for fever. Respiratory:  Negative for shortness of breath. Cardiovascular:  Negative for chest pain. Genitourinary:  Negative for dysuria. Musculoskeletal:  Positive for joint pain. Neurological:  Negative for dizziness. All other systems reviewed and are negative.        Objective:   VITALS:    Visit Vitals  BP (!) 140/81 (BP 1 Location: Left upper arm, BP Patient Position: Supine)   Pulse 91   Temp 97.4 °F (36.3 °C)   Resp 15   Ht 6' 4\" (1.93 m)   Wt 133.8 kg (295 lb)   SpO2 96% BMI 35.91 kg/m²       Physical Exam  Vitals and nursing note reviewed. Constitutional:       General: He is not in acute distress. Appearance: He is well-developed. He is ill-appearing. HENT:      Head: Normocephalic and atraumatic. Eyes:      Conjunctiva/sclera: Conjunctivae normal.      Pupils: Pupils are equal, round, and reactive to light. Cardiovascular:      Rate and Rhythm: Normal rate and regular rhythm. Pulmonary:      Effort: Pulmonary effort is normal. No respiratory distress. Breath sounds: Normal breath sounds. No stridor. No wheezing or rhonchi. Abdominal:      General: Bowel sounds are normal. There is no distension. Palpations: Abdomen is soft. Tenderness: There is no abdominal tenderness. Musculoskeletal:         General: Swelling and tenderness present. Normal range of motion. Cervical back: Normal range of motion and neck supple. Right lower leg: No edema. Left lower leg: No edema. Comments: Minor swelling noted to the right knee and lower leg normal distal circulation and sensation dressing and Ace wrap applied to right knee/leg   Skin:     General: Skin is warm and dry. Neurological:      Mental Status: He is alert and oriented to person, place, and time. Psychiatric:         Behavior: Behavior normal.         Thought Content:  Thought content normal.         Judgment: Judgment normal.       _______________________________________________________________________  Care Plan discussed with:    Comments   Patient X    Family      RN X    Care Manager                    Consultant:      _______________________________________________________________________  Expected  Disposition:   Home with Family X   HH/PT/OT/RN    SNF/LTC    LOBO    ________________________________________________________________________  TOTAL TIME:  48 Minutes    Critical Care Provided     Minutes non procedure based      Comments    X Reviewed previous records   >50% of visit spent in counseling and coordination of care X Discussion with patient and/or family and questions answered       ________________________________________________________________________    Labs:  Recent Results (from the past 24 hour(s))   CBC WITH AUTOMATED DIFF    Collection Time: 08/17/22  8:50 PM   Result Value Ref Range    WBC 10.7 4.6 - 13.2 K/uL    RBC 4.33 (L) 4.35 - 5.65 M/uL    HGB 13.9 13.0 - 16.0 g/dL    HCT 42.0 36.0 - 48.0 %    MCV 97.0 78.0 - 100.0 FL    MCH 32.1 24.0 - 34.0 PG    MCHC 33.1 31.0 - 37.0 g/dL    RDW 13.5 11.6 - 14.5 %    PLATELET 151 850 - 915 K/uL    MPV 10.6 9.2 - 11.8 FL    NRBC 0.0 0.0  WBC    ABSOLUTE NRBC 0.00 0.00 - 0.01 K/uL    NEUTROPHILS 84 (H) 40 - 73 %    LYMPHOCYTES 9 (L) 21 - 52 %    MONOCYTES 6 3 - 10 %    EOSINOPHILS 0 0 - 5 %    BASOPHILS 0 0 - 2 %    IMMATURE GRANULOCYTES 1 (H) 0 - 0.5 %    ABS. NEUTROPHILS 9.1 (H) 1.8 - 8.0 K/UL    ABS. LYMPHOCYTES 1.0 0.9 - 3.6 K/UL    ABS. MONOCYTES 0.6 0.05 - 1.2 K/UL    ABS. EOSINOPHILS 0.0 0.0 - 0.4 K/UL    ABS. BASOPHILS 0.0 0.0 - 0.1 K/UL    ABS. IMM.  GRANS. 0.1 (H) 0.00 - 0.04 K/UL    DF AUTOMATED     TYPE & SCREEN    Collection Time: 08/17/22  8:50 PM   Result Value Ref Range    Crossmatch Expiration 08/20/2022,2359     ABO/Rh(D) A Positive     Antibody screen Negative    PROTHROMBIN TIME + INR    Collection Time: 08/17/22  8:50 PM   Result Value Ref Range    Prothrombin time 14.9 11.5 - 15.2 sec    INR 1.1 0.8 - 1.2     METABOLIC PANEL, BASIC    Collection Time: 08/17/22  8:50 PM   Result Value Ref Range    Sodium 139 136 - 145 mmol/L    Potassium 4.6 3.5 - 5.5 mmol/L    Chloride 104 100 - 111 mmol/L    CO2 26 21 - 32 mmol/L    Anion gap 9 3.0 - 18.0 mmol/L    Glucose 139 (H) 74 - 99 mg/dL    BUN 16 7 - 18 mg/dL    Creatinine 1.43 (H) 0.60 - 1.30 mg/dL    BUN/Creatinine ratio 11 (L) 12 - 20      GFR est AA 59 (L) >60 ml/min/1.73m2    GFR est non-AA 49 (L) >60 ml/min/1.73m2    Calcium 8.8 8.5 - 10.1 mg/dL Imaging:  XR KNEE RT MAX 2 VWS    Result Date: 8/17/2022  EXAM: XR KNEE RT MAX 2 VWS CLINICAL INDICATION/HISTORY: post op -Additional: None COMPARISON: None TECHNIQUE: 2 views of the right knee _______________ FINDINGS: BONES: The patient has undergone recent right total knee arthroplasty. Exact alignment of the orthopedic hardware. There is minimal fragmentation adjacent to the lateral tibial plateau. SOFT TISSUES: Expected soft tissue gas and fluid in keeping with recent surgery. No radiopaque foreign body. _______________     1. Expected postoperative changes, status post total knee arthroplasty. Assessment & Plan:       Post-operative complication  Status post only 817  Dr. Fausto Cintron will see patient in hospital  Dressing and bandage applied by ED no active bleeding past bandage  A.m. labs to check H&H, coags  Elevate right leg  IV fluid at 75/h    Chronic atrial fibrillation ([address]  This is a chronic problem  Continue Cardizem, Coreg, Eliquis        Code Status: Full      Prophylaxis: Hold anticoagulation at this time      Electronically Signed : Brittanie Parra ENP-C, FNP-C, [address] voice recognition was used to generate this report, which may have resulted in some phonetic based errors in grammar and contents.  Even though attempts were made to correct all the mistakes, some may have been missed, and remained in the body of the document

## 2022-08-18 NOTE — ED PROVIDER NOTES
EMERGENCY DEPARTMENT HISTORY AND PHYSICAL EXAM      Date: 8/17/2022  Patient Name: Fouzia Rutherford    History of Presenting Illness     Chief Complaint   Patient presents with    Other       History Provided By: Patient and nephew    HPI: Fouzia Rutherford, 71 y.o. male with a past medical history significant hypertension, hyperlipidemia, obesity, and atrial fibrillation, OA, s/p R TKA per Dr. Lara Johnson today, 8/17/22  presents to the ED with cc of operative site bleeding since 1500. Patient's leg, incision immediately evaluated with nursing staff. Blood soaked through coban, however, incision looks intact and stable, op site pristine. Patient's last dose of Eliquis Saturday, 4 days ago. Pressure applied, coban replaced with surgicel. Ice and elevation to leg while awaiting call back from Dr. Lara Johnson. Labs sent including CBC, PT/INR, BMP, type and screen. Patient denies dizziness, falls, trauma to knee, CP, SOB, numbness, tingling. There are no other complaints, changes, or physical findings at this time.     PCP: Loly Munoz MD    Current Facility-Administered Medications on File Prior to Encounter   Medication Dose Route Frequency Provider Last Rate Last Admin    [COMPLETED] acetaminophen (TYLENOL) tablet 1,000 mg  1,000 mg Oral Olga Brigette Calrisle, CRNA   1,000 mg at 08/17/22 8636    [COMPLETED] gabapentin (NEURONTIN) capsule 300 mg  300 mg Oral Olga Shorts, Brigette Zaydle, CRNA   300 mg at 08/17/22 0707    [COMPLETED] ceFAZolin (ANCEF) 2 g in 0.9% sodium chloride (MBP/ADV) 100 mL MBP  2 g IntraVENous ONCE Holloway Crenshaw A, ARNP   2 g at 08/17/22 1457    [COMPLETED] midazolam (VERSED) injection   IntraVENous  Mancil Jasmyne, CRNA   4 mg at 08/17/22 0729    [COMPLETED] dexamethasone (DECADRON) 4 mg/mL injection   Peripheral Nerve Block  Mancil Dilerin, CRNA   10 mg at 08/17/22 0734    [COMPLETED] bupivacaine (PF) (MARCAINE) 0.25 % (2.5 mg/mL) injection   Peripheral Nerve Block  Mario Dilerin, CRNA   37 mL at 08/17/22 0734    [COMPLETED] midazolam (VERSED) injection   IntraVENous  Maty Sims, CRNA   2 mg at 08/17/22 0604    [COMPLETED] fentaNYL citrate (PF) injection   IntraVENous  Maty Rafaela, CRNA   100 mcg at 08/17/22 0826    [COMPLETED] bupivacaine (PF) (MARCAINE) 0.5 % (5 mg/mL) injection   Intrathecal  Maty Sims, CRNA   12 mg at 08/17/22 3397    [DISCONTINUED] lactated Ringers infusion  25 mL/hr IntraVENous CONTINUOUS Sheng Lisandro, CRNA   Stopped at 08/17/22 1020    [DISCONTINUED] sodium chloride (NS) flush 5-40 mL  5-40 mL IntraVENous PRN Sheng Lisandro, SKINNY        [DISCONTINUED] oxyCODONE-acetaminophen (PERCOCET) 5-325 mg per tablet 2 Tablet  2 Tablet Oral Q4H PRN SAAD Zacarias        [DISCONTINUED] oxyCODONE-acetaminophen (PERCOCET 10)  mg per tablet 1 Tablet  1 Tablet Oral Q4H PRN SAAD Zacarias        [DISCONTINUED] ondansetron [facility]) injection 4 mg  4 mg IntraVENous Q4H PRN SAAD Zacarias        [DISCONTINUED] lactated Ringers infusion  25 mL/hr IntraVENous CONTINUOUS Maty Sims, SKINNY        [DISCONTINUED] albuterol (PROVENTIL VENTOLIN) nebulizer solution 2.5 mg  2.5 mg Nebulization ONCE PRN Maty Sims, CRNA        [DISCONTINUED] fentaNYL citrate (PF) injection 50 mcg  50 mcg IntraVENous PRN Maty Levi, CRNA        [DISCONTINUED] fentaNYL citrate (PF) injection 50 mcg  50 mcg IntraVENous Multiple Maty Levi, CRNA        [DISCONTINUED] naloxone [facility]) injection 0.1 mg  0.1 mg IntraVENous EVERY 2 MINUTES AS NEEDED Maty Sims, CRNA        [DISCONTINUED] flumazeniL (ROMAZICON) 0.1 mg/mL injection 0.2 mg  0.2 mg IntraVENous Multiple Maty Levi, CRNA        [DISCONTINUED] ondansetron [facility]) injection 4 mg  4 mg IntraVENous ONCE Maty Levi, CRNA        [DISCONTINUED] diphenhydrAMINE (BENADRYL) injection 12.5 mg  12.5 mg IntraVENous Multiple Maty Levi, CRNA        [DISCONTINUED] tranexamic acid (CYKLOKAPRON) 10,000 mg in 0.9% sodium chloride 100 mL IVPB   IntraVENous CONTINUOUS Kartik Florida, CRNA   1 g at 08/17/22 4743    [DISCONTINUED] propofoL (DIPRIVAN) 10 mg/mL injection   IntraVENous CONTINUOUS Kartik [place], CRNA   Stopped at 08/17/22 1013    [DISCONTINUED] bupivacaine (PF) (MARCAINE) 0.25 % (2.5 mg/mL) injection    PRN Vanessa JAIME MD   20 mL at 08/17/22 0920    [DISCONTINUED] polymyxin B 500,000 Units in sodium chloride irrigation 0.9 % 1,000 mL Irrigation    PRN Vanessa JAIME MD   1,000 mL at 08/17/22 0920    [DISCONTINUED] ePHEDrine in NS (PF) (MISTOLE) 10 mg/mL in NS syringe   IntraVENous PRN Kartik [place], CRNA   20 mg at 08/17/22 6950    [DISCONTINUED] ketorolac (TORADOL) injection   IntraVENous PRN Kartik Florida, CRNA   30 mg at 08/17/22 1014     Current Outpatient Medications on File Prior to Encounter   Medication Sig Dispense Refill    prednisoLONE acetate (PRED FORTE) 1 % ophthalmic suspension prednisolone acetate 1 % eye drops,suspension   instill 1 drop four times a day IN OPERATIVE EYE STARTING 1 DAY P...  (REFER TO PRESCRIPTION NOTES). tobramycin (TOBREX) 0.3 % ophthalmic solution       tropicamide (mydRIACYL) 1 % ophthalmic solution       oxyCODONE-acetaminophen (Percocet) 5-325 mg per tablet Take 1 Tablet by mouth every four to six (4-6) hours as needed for Pain for up to 14 days. Max Daily Amount: 6 Tablets. 30 Tablet 0    ondansetron (ZOFRAN ODT) 4 mg disintegrating tablet Take 1 Tablet by mouth every eight (8) hours as needed for Nausea, Vomiting or Nausea or Vomiting for up to 20 doses. 20 Tablet 0    apixaban (ELIQUIS) 5 mg tablet Take 5 mg by mouth two (2) times a day. acetaminophen 325 mg cap Tylenol 325 mg capsule   PRN      pantoprazole (PROTONIX) 40 mg tablet       finasteride (PROSCAR) 5 mg tablet TAKE 1 TABLET DAILY 90 Tab 3    carvedilol (COREG) 6.25 mg tablet Take  by mouth two (2) times daily (with meals). docusate sodium (COLACE) 100 mg capsule Take 100 mg by mouth. cholecalciferol (VITAMIN D3) 25 mcg (1,000 unit) cap Take  by mouth daily. ferrous sulfate 325 mg (65 mg iron) tablet Take  by mouth Daily (before breakfast). dilTIAZem CD (CARDIZEM CD) 240 mg ER capsule          Past History     Past Medical History:  Past Medical History:   Diagnosis Date    Chronic atrial fibrillation (HCC)     Chronic diarrhea     HLD (hyperlipidemia)     HTN (hypertension)     Hyperparathyroidism (HCC)     Impaired glucose tolerance     Obesity     Pleural effusion        Past Surgical History:  Past Surgical History:   Procedure Laterality Date    HX PARATHYROIDECTOMY         Family History:  History reviewed. No pertinent family history. Social History:  Social History     Tobacco Use    Smoking status: Never    Smokeless tobacco: Never   Vaping Use    Vaping Use: Never used   Substance Use Topics    Alcohol use: No       Allergies:  No Known Allergies      Review of Systems     Review of Systems   Constitutional:  Negative for chills, fatigue and fever. HENT: Negative. Eyes: Negative. Respiratory:  Negative for cough, choking, chest tightness and shortness of breath. Cardiovascular:  Negative for chest pain and palpitations. Gastrointestinal:  Negative for abdominal pain, nausea and vomiting. Genitourinary:  Negative for difficulty urinating. Musculoskeletal:  Positive for arthralgias and joint swelling. Skin:  Positive for wound. Negative for pallor. Neurological:  Negative for dizziness, syncope, speech difficulty, light-headedness and numbness. Hematological:  Negative for adenopathy. Psychiatric/Behavioral:  Negative for confusion. All other systems reviewed and are negative. Physical Exam     Physical Exam  Vitals and nursing note reviewed. Constitutional:       Appearance: Normal appearance. He is obese. He is not ill-appearing or toxic-appearing.    HENT:      Head: Normocephalic and atraumatic. Mouth/Throat:      Mouth: Mucous membranes are moist.      Pharynx: Oropharynx is clear. Eyes:      Extraocular Movements: Extraocular movements intact. Conjunctiva/sclera: Conjunctivae normal.      Pupils: Pupils are equal, round, and reactive to light. Cardiovascular:      Rate and Rhythm: Normal rate and regular rhythm. Pulses: Normal pulses. Heart sounds: Normal heart sounds. Pulmonary:      Effort: Pulmonary effort is normal.      Breath sounds: Normal breath sounds. Abdominal:      General: Bowel sounds are normal.      Palpations: Abdomen is soft. Tenderness: There is no abdominal tenderness. Musculoskeletal:      Right knee: Swelling present. Legs:       Comments: Right anterior s/p TKA   Skin:     General: Skin is warm. Capillary Refill: Capillary refill takes less than 2 seconds. Comments: Post-operative R TKA incision anteriorly, bleeding   Neurological:      General: No focal deficit present. Mental Status: He is alert and oriented to person, place, and time. Cranial Nerves: No cranial nerve deficit. Psychiatric:         Mood and Affect: Mood normal.         Behavior: Behavior normal.         Thought Content:  Thought content normal.         Judgment: Judgment normal.       Lab and Diagnostic Study Results     Labs -     Recent Results (from the past 12 hour(s))   CBC WITH AUTOMATED DIFF    Collection Time: 08/17/22  8:50 PM   Result Value Ref Range    WBC 10.7 4.6 - 13.2 K/uL    RBC 4.33 (L) 4.35 - 5.65 M/uL    HGB 13.9 13.0 - 16.0 g/dL    HCT 42.0 36.0 - 48.0 %    MCV 97.0 78.0 - 100.0 FL    MCH 32.1 24.0 - 34.0 PG    MCHC 33.1 31.0 - 37.0 g/dL    RDW 13.5 11.6 - 14.5 %    PLATELET 293 425 - 679 K/uL    MPV 10.6 9.2 - 11.8 FL    NRBC 0.0 0.0  WBC    ABSOLUTE NRBC 0.00 0.00 - 0.01 K/uL    NEUTROPHILS 84 (H) 40 - 73 %    LYMPHOCYTES 9 (L) 21 - 52 %    MONOCYTES 6 3 - 10 %    EOSINOPHILS 0 0 - 5 % BASOPHILS 0 0 - 2 %    IMMATURE GRANULOCYTES 1 (H) 0 - 0.5 %    ABS. NEUTROPHILS 9.1 (H) 1.8 - 8.0 K/UL    ABS. LYMPHOCYTES 1.0 0.9 - 3.6 K/UL    ABS. MONOCYTES 0.6 0.05 - 1.2 K/UL    ABS. EOSINOPHILS 0.0 0.0 - 0.4 K/UL    ABS. BASOPHILS 0.0 0.0 - 0.1 K/UL    ABS. IMM. GRANS. 0.1 (H) 0.00 - 0.04 K/UL    DF AUTOMATED     TYPE & SCREEN    Collection Time: 08/17/22  8:50 PM   Result Value Ref Range    Crossmatch Expiration 08/20/2022,2359     ABO/Rh(D) A Positive     Antibody screen Negative    PROTHROMBIN TIME + INR    Collection Time: 08/17/22  8:50 PM   Result Value Ref Range    Prothrombin time 14.9 11.5 - 15.2 sec    INR 1.1 0.8 - 1.2     METABOLIC PANEL, BASIC    Collection Time: 08/17/22  8:50 PM   Result Value Ref Range    Sodium 139 136 - 145 mmol/L    Potassium 4.6 3.5 - 5.5 mmol/L    Chloride 104 100 - 111 mmol/L    CO2 26 21 - 32 mmol/L    Anion gap 9 3.0 - 18.0 mmol/L    Glucose 139 (H) 74 - 99 mg/dL    BUN 16 7 - 18 mg/dL    Creatinine 1.43 (H) 0.60 - 1.30 mg/dL    BUN/Creatinine ratio 11 (L) 12 - 20      GFR est AA 59 (L) >60 ml/min/1.73m2    GFR est non-AA 49 (L) >60 ml/min/1.73m2    Calcium 8.8 8.5 - 10.1 mg/dL       Radiologic Studies -   @lastxrresult@  CT Results  (Last 48 hours)      None          CXR Results  (Last 48 hours)      None              Medical Decision Making   - I am the first provider for this patient. - I reviewed the vital signs, available nursing notes, past medical history, past surgical history, family history and social history. - Initial assessment performed. The patients presenting problems have been discussed, and they are in agreement with the care plan formulated and outlined with them. I have encouraged them to ask questions as they arise throughout their visit. Vital Signs-Reviewed the patient's vital signs.   Patient Vitals for the past 12 hrs:   Temp Pulse Resp BP SpO2   08/18/22 0400 98 °F (36.7 °C) 84 15 119/65 97 %   08/18/22 0002 97.4 °F (36.3 °C) 91 15 (!) 140/81 96 %   08/17/22 2329 -- 80 18 136/68 98 %   08/17/22 2309 -- 84 18 123/64 95 %   08/17/22 2030 98.3 °F (36.8 °C) (!) 116 20 (!) 141/85 98 %       Records Reviewed: Nursing Notes, Old Medical Records, Previous electrocardiograms, and Operative notes    The patient presents with post-operative bleeding with a differential diagnosis of anti-coaglant side effect, post-operative bleeding, trauma, incision dehiscence, clotting disorder, effusion, venous bleed, arterial bleed      ED Course/ Provider Notes (Medical Decision Making):     ED Course as of 08/18/22 0447   Wed Aug 17, 2022   2115 Patient's leg, incision immediately evaluated with nursing staff. Blood soaked through coban, however, incision looks intact and stable, op site pristine. Patient's last dose of Eliquis Saturday, 4 days ago. Pressure applied, coban replaced with surgicel. Ice and elevation to leg while awaiting call back from Dr. Savi Rodriguez. Labs sent including CBC, PT/INR, BMP, type and screen. [OM]   2123 Perfect served images to Dr. Savi Rodriguez [OM]   5 Spoke with Dr. Savi Rodriguez and he states that patient had some minor bleeding in surgery as well. He would like patient to be admitted to orthopedic list so that he can change bandages himself. He would like copious ABD pads to be applied to knee/calf sites followed by Ace wrap and knee immobilizer. Continue pain management and antibiotics. [OM]   9651 Discussed admission with Yamel Graves NP, hospitalist.  Recommended telemetry given the fact that patient had a major surgery, has atrial fibrillation. [OM]   u Aug 18, 2022   0444 Hemostasis obtained within 10-15 minutes of ER care. Operative incision site looks excellent, and per Dr. Oswaldo Samaniego instructions, abd pads, ACE wrap, and knee immobilizer placed. Patient admitted to obs, will continue PO pain management and abx. Dr. Savi Rodriguez to do bandage changes.  Appreciate orthopedic surgeon's help and consultation in ER. [OM]      ED Course User Index  [OM] Pamela Monroy DO       i4:46 AM  I have spent 30 minutes of critical care time involved in lab review, consultations with specialist, family decision-making, and documentation. During this entire length of time I was immediately available to the patient. Critical Care: The reason for providing this level of medical care for this critically ill patient was due a critical illness that impaired one or more vital organ systems such that there was a high probability of imminent or life threatening deterioration in the patients condition. This care involved high complexity decision making to assess, manipulate, and support vital system functions, to treat this degreee vital organ system failure and to prevent further life threatening deterioration of the patients condition. MDM         Procedures   Medical Decision Makingedical Decision Making  Performed by: Dhruv Cornelius DO  PROCEDURES:  Procedures       Disposition   Disposition: Condition stable  Admitted to Observation Unit the case was discussed with Orthopedic Surgeon Dr. Jhoan Marin and admitting hospitalist Ligia Crowell NP. Admitted    DISCHARGE PLAN:  1. Current Discharge Medication List        CONTINUE these medications which have NOT CHANGED    Details   prednisoLONE acetate (PRED FORTE) 1 % ophthalmic suspension prednisolone acetate 1 % eye drops,suspension   instill 1 drop four times a day IN OPERATIVE EYE STARTING 1 DAY P...  (REFER TO PRESCRIPTION NOTES). tobramycin (TOBREX) 0.3 % ophthalmic solution       tropicamide (mydRIACYL) 1 % ophthalmic solution       oxyCODONE-acetaminophen (Percocet) 5-325 mg per tablet Take 1 Tablet by mouth every four to six (4-6) hours as needed for Pain for up to 14 days. Max Daily Amount: 6 Tablets.   Qty: 30 Tablet, Refills: 0    Comments: Do no take until after surgery  Associated Diagnoses: Pain in both knees, unspecified chronicity; Osteoarthritis of both knees, unspecified osteoarthritis type      ondansetron (ZOFRAN ODT) 4 mg disintegrating tablet Take 1 Tablet by mouth every eight (8) hours as needed for Nausea, Vomiting or Nausea or Vomiting for up to 20 doses. Qty: 20 Tablet, Refills: 0      apixaban (ELIQUIS) 5 mg tablet Take 5 mg by mouth two (2) times a day. acetaminophen 325 mg cap Tylenol 325 mg capsule   PRN      pantoprazole (PROTONIX) 40 mg tablet       finasteride (PROSCAR) 5 mg tablet TAKE 1 TABLET DAILY  Qty: 90 Tab, Refills: 3      carvedilol (COREG) 6.25 mg tablet Take  by mouth two (2) times daily (with meals). docusate sodium (COLACE) 100 mg capsule Take 100 mg by mouth. cholecalciferol (VITAMIN D3) 25 mcg (1,000 unit) cap Take  by mouth daily. ferrous sulfate 325 mg (65 mg iron) tablet Take  by mouth Daily (before breakfast). dilTIAZem CD (CARDIZEM CD) 240 mg ER capsule            2.   Follow-up Information    None       3. Return to ED if worse   4. Current Discharge Medication List            Diagnosis     Clinical Impression:   1. Other postoperative complication involving circulatory system        Attestations:    Alvaro Graves, DO    Please note that this dictation was completed with [employer], the computer voice recognition software. Quite often unanticipated grammatical, syntax, homophones, and other interpretive errors are inadvertently transcribed by the computer software. Please disregard these errors. Please excuse any errors that have escaped final proofreading. Thank you.

## 2022-08-18 NOTE — PROGRESS NOTES
Estela Sarah in to see pt and changed dressings. Grace Mcgee and approved for pt to be discharge today,pt is to follow up with DR. Guerra Monday ,Aug 22th at 1530. Leisa Gaming  was informed.

## 2022-08-18 NOTE — PROGRESS NOTES
Anastasiya KOHLI, orthnadine,  called and inform her of the bleeding moted on pt's upper thigh and none seen  near ankle.,instructed to changed that part of the dressing. @1100  Ace wrap and  ABD drsg . Removed noted agdrsg that is directly on incision saturated with blood. [employer] notified  , inform will be up to see pt. Poncho Ureña @1563  Complete drsg changed to rt leg incision steril 4x4's and ABD's  drsg applied ,acewrap and thigh high K[employer]i stocking applied. 1300pt's discharge is on hold at this time. Poncho Ureña

## 2022-08-22 ENCOUNTER — OFFICE VISIT (OUTPATIENT)
Dept: ORTHOPEDIC SURGERY | Age: 72
End: 2022-08-22
Payer: MEDICARE

## 2022-08-22 DIAGNOSIS — M25.562 PAIN IN BOTH KNEES, UNSPECIFIED CHRONICITY: Primary | ICD-10-CM

## 2022-08-22 DIAGNOSIS — M25.561 PAIN IN BOTH KNEES, UNSPECIFIED CHRONICITY: Primary | ICD-10-CM

## 2022-08-22 PROCEDURE — 99024 POSTOP FOLLOW-UP VISIT: CPT | Performed by: ORTHOPAEDIC SURGERY

## 2022-08-22 RX ORDER — CEPHALEXIN 500 MG/1
500 CAPSULE ORAL 4 TIMES DAILY
Qty: 28 CAPSULE | Refills: 0 | Status: ON HOLD | OUTPATIENT
Start: 2022-08-22 | End: 2022-08-26

## 2022-08-22 RX ORDER — OXYCODONE AND ACETAMINOPHEN 5; 325 MG/1; MG/1
1 TABLET ORAL
Qty: 30 TABLET | Refills: 0 | Status: SHIPPED | OUTPATIENT
Start: 2022-08-22 | End: 2022-08-23 | Stop reason: SINTOL

## 2022-08-22 NOTE — PROGRESS NOTES
Name: Estefanía Dasilva    : 1950     Service Dept: [address] [place] [address]    No chief complaint on file. There were no vitals taken for this visit. No Known Allergies     Current Outpatient Medications   Medication Sig Dispense Refill    prednisoLONE acetate (PRED FORTE) 1 % ophthalmic suspension prednisolone acetate 1 % eye drops,suspension   instill 1 drop four times a day IN OPERATIVE EYE STARTING 1 DAY P...  (REFER TO PRESCRIPTION NOTES). tobramycin (TOBREX) 0.3 % ophthalmic solution       tropicamide (mydRIACYL) 1 % ophthalmic solution       ondansetron (ZOFRAN ODT) 4 mg disintegrating tablet Take 1 Tablet by mouth every eight (8) hours as needed for Nausea, Vomiting or Nausea or Vomiting for up to 20 doses. 20 Tablet 0    apixaban (ELIQUIS) 5 mg tablet Take 5 mg by mouth two (2) times a day. acetaminophen 325 mg cap Tylenol 325 mg capsule   PRN      pantoprazole (PROTONIX) 40 mg tablet       finasteride (PROSCAR) 5 mg tablet TAKE 1 TABLET DAILY 90 Tab 3    carvedilol (COREG) 6.25 mg tablet Take  by mouth two (2) times daily (with meals). docusate sodium (COLACE) 100 mg capsule Take 100 mg by mouth. cholecalciferol (VITAMIN D3) 25 mcg (1,000 unit) cap Take  by mouth daily. ferrous sulfate 325 mg (65 mg iron) tablet Take  by mouth Daily (before breakfast). dilTIAZem CD (CARDIZEM CD) 240 mg ER capsule         Patient Active Problem List   Diagnosis Code    Prostate nodule N40.2    BPH with obstruction/lower urinary tract symptoms N40.1, N13.8    Recurrent UTI N39.0    OA (osteoarthritis) of knee M17.10    Post-operative complication U72. 9XXA    Chronic atrial fibrillation (HCC) I48.20      No family history on file.    Social History     Socioeconomic History    Marital status:    Tobacco Use    Smoking status: Never    Smokeless tobacco: Never   Vaping Use    Vaping Use: Never used   Substance and Sexual Activity Alcohol use: No      Past Surgical History:   Procedure Laterality Date    HX PARATHYROIDECTOMY        Past Medical History:   Diagnosis Date    Chronic atrial fibrillation (HCC)     Chronic diarrhea     HLD (hyperlipidemia)     HTN (hypertension)     Hyperparathyroidism (HCC)     Impaired glucose tolerance     Obesity     Pleural effusion         I have reviewed and agree with PFSH and ROS and intake form in chart and the record furthermore I have reviewed prior medical record(s) regarding this patients care during this appointment. Review of Systems:   Patient is a pleasant appearing individual, appropriately dressed, well hydrated, well nourished, who is alert, appropriately oriented for age, and in no acute distress with a normal gait and normal affect who does not appear to be in any significant pain. Physical Exam:  Left Hand - No point tenderness, Full range of motion, No instability, No Weakness, No, skin lesions, No swelling, Grossly neurovascularly intact. No diagnosis found. HPI:  The patient is status post right total knee replacement. Has some postop bleeding. Doing significantly better compared to before. Surgery was 08/17/2022. Assessment/Plan:  Plan at this point, continue with physical therapy. We will see him back in a week. At that point, he should be able to shower and get it wet. Continue physical therapy as well and go from there. As part of continued conservative pain management options the patient was advised to utilize Tylenol or OTC NSAIDS as long as it is not medically contraindicated. Return to Office:         Scribed by Sue Russell MD as dictated by Annetta Montes. Monico Nageotte, MD.  Documentation True and Accepted Manish A. Monico Nageotte, MD

## 2022-08-23 ENCOUNTER — APPOINTMENT (OUTPATIENT)
Dept: PHYSICAL THERAPY | Age: 72
End: 2022-08-23

## 2022-08-23 DIAGNOSIS — Z96.651 STATUS POST RIGHT KNEE REPLACEMENT: Primary | ICD-10-CM

## 2022-08-23 RX ORDER — HYDROCODONE BITARTRATE AND ACETAMINOPHEN 5; 325 MG/1; MG/1
1 TABLET ORAL
Qty: 30 TABLET | Refills: 0 | Status: SHIPPED | OUTPATIENT
Start: 2022-08-23 | End: 2022-08-29 | Stop reason: SDUPTHER

## 2022-08-23 NOTE — PATIENT INSTRUCTIONS
Knee Pain or Injury: Care Instructions  Your Care Instructions     Injuries are a common cause of knee problems. Sudden (acute) injuries may be caused by a direct blow to the knee. They can also be caused by abnormal twisting, bending, or falling on the knee. Pain, bruising, or swelling may be severe, and may start within minutes of the injury. Overuse is another cause of knee pain. Other causes are climbing stairs, kneeling, and other activities that use the knee. Everyday wear and tear, especially as you get older, also can cause knee pain. Rest, along with home treatment, often relieves pain and allows your knee to heal. If you have a serious knee injury, you may need tests and treatment. Follow-up care is a key part of your treatment and safety. Be sure to make and go to all appointments, and call your doctor if you are having problems. It's also a good idea to know your test results and keep a list of the medicines you take. How can you care for yourself at home? Be safe with medicines. Read and follow all instructions on the label. If the doctor gave you a prescription medicine for pain, take it as prescribed. If you are not taking a prescription pain medicine, ask your doctor if you can take an over-the-counter medicine. Rest and protect your knee. Take a break from any activity that may cause pain. Put ice or a cold pack on your knee for 10 to 20 minutes at a time. Put a thin cloth between the ice and your skin. Prop up a sore knee on a pillow when you ice it or anytime you sit or lie down for the next 3 days. Try to keep it above the level of your heart. This will help reduce swelling. If your knee is not swollen, you can put moist heat, a heating pad, or a warm cloth on your knee. If your doctor recommends an elastic bandage, sleeve, or other type of support for your knee, wear it as directed. Follow your doctor's instructions about how much weight you can put on your leg.  Use a cane, crutches, or a walker as instructed. Follow your doctor's instructions about activity during your healing process. If you can do mild exercise, slowly increase your activity. Reach and stay at a healthy weight. Extra weight can strain the joints, especially the knees and hips, and make the pain worse. Losing even a few pounds may help. When should you call for help? Call 911 anytime you think you may need emergency care. For example, call if:    You have symptoms of a blood clot in your lung (called a pulmonary embolism). These may include:  Sudden chest pain. Trouble breathing. Coughing up blood. Call your doctor now or seek immediate medical care if:    You have severe or increasing pain. Your leg or foot turns cold or changes color. You cannot stand or put weight on your knee. Your knee looks twisted or bent out of shape. You cannot move your knee. You have signs of infection, such as: Increased pain, swelling, warmth, or redness. Red streaks leading from the knee. Pus draining from a place on your knee. A fever. You have signs of a blood clot in your leg (called a deep vein thrombosis), such as:  Pain in your calf, back of the knee, thigh, or groin. Redness and swelling in your leg or groin. Watch closely for changes in your health, and be sure to contact your doctor if:    You have tingling, weakness, or numbness in your knee. You have any new symptoms, such as swelling. You have bruises from a knee injury that last longer than 2 weeks. You do not get better as expected. Where can you learn more? Go to http://www.gray.com/  Enter K195 in the search box to learn more about \"Knee Pain or Injury: Care Instructions. \"  Current as of: July 1, 2021               Content Version: 13.2  © 2006-2022 [employer].    Care instructions adapted under license by [employer] (which disclaims liability or warranty for this information). If you have questions about a medical condition or this instruction, always ask your healthcare professional. [place] [address] any warranty or liability for your use of this information.

## 2022-08-25 ENCOUNTER — HOSPITAL ENCOUNTER (INPATIENT)
Age: 72
LOS: 3 days | Discharge: HOME | DRG: 603 | End: 2022-08-28
Attending: EMERGENCY MEDICINE | Admitting: INTERNAL MEDICINE
Payer: MEDICARE

## 2022-08-25 ENCOUNTER — APPOINTMENT (OUTPATIENT)
Dept: GENERAL RADIOLOGY | Age: 72
DRG: 603 | End: 2022-08-25
Attending: INTERNAL MEDICINE
Payer: MEDICARE

## 2022-08-25 ENCOUNTER — HOSPITAL ENCOUNTER (OUTPATIENT)
Dept: PHYSICAL THERAPY | Age: 72
Discharge: HOME | End: 2022-08-25

## 2022-08-25 ENCOUNTER — HOSPITAL ENCOUNTER (OUTPATIENT)
Dept: VASCULAR SURGERY | Age: 72
Discharge: HOME | DRG: 603 | End: 2022-08-25
Attending: ORTHOPAEDIC SURGERY
Payer: MEDICARE

## 2022-08-25 ENCOUNTER — OFFICE VISIT (OUTPATIENT)
Dept: ORTHOPEDIC SURGERY | Age: 72
End: 2022-08-25
Payer: MEDICARE

## 2022-08-25 DIAGNOSIS — Z96.651 STATUS POST RIGHT KNEE REPLACEMENT: Primary | ICD-10-CM

## 2022-08-25 DIAGNOSIS — L03.115 CELLULITIS OF RIGHT LOWER EXTREMITY: Primary | ICD-10-CM

## 2022-08-25 DIAGNOSIS — Z96.651 STATUS POST TOTAL RIGHT KNEE REPLACEMENT: ICD-10-CM

## 2022-08-25 DIAGNOSIS — Z96.651 STATUS POST TOTAL RIGHT KNEE REPLACEMENT: Primary | ICD-10-CM

## 2022-08-25 PROBLEM — I48.20 CHRONIC A-FIB (HCC): Status: ACTIVE | Noted: 2022-08-25

## 2022-08-25 LAB
ANION GAP SERPL CALC-SCNC: 7 MMOL/L (ref 3–18)
BASOPHILS # BLD: 0 K/UL (ref 0–0.1)
BASOPHILS NFR BLD: 1 % (ref 0–2)
BUN SERPL-MCNC: 14 MG/DL (ref 7–18)
BUN/CREAT SERPL: 13 (ref 12–20)
CA-I BLD-MCNC: 8.9 MG/DL (ref 8.5–10.1)
CHLORIDE SERPL-SCNC: 99 MMOL/L (ref 100–111)
CO2 SERPL-SCNC: 29 MMOL/L (ref 21–32)
CREAT SERPL-MCNC: 1.12 MG/DL (ref 0.6–1.3)
DIFFERENTIAL METHOD BLD: ABNORMAL
EOSINOPHIL # BLD: 0.2 K/UL (ref 0–0.4)
EOSINOPHIL NFR BLD: 2 % (ref 0–5)
ERYTHROCYTE [DISTWIDTH] IN BLOOD BY AUTOMATED COUNT: 14.1 % (ref 11.6–14.5)
GLUCOSE SERPL-MCNC: 117 MG/DL (ref 74–99)
HCT VFR BLD AUTO: 35.8 % (ref 36–48)
HGB BLD-MCNC: 11.7 G/DL (ref 13–16)
IMM GRANULOCYTES # BLD AUTO: 0.1 K/UL (ref 0–0.04)
IMM GRANULOCYTES NFR BLD AUTO: 1 % (ref 0–0.5)
LACTATE SERPL-SCNC: 1.1 MMOL/L (ref 0.4–2)
LYMPHOCYTES # BLD: 1.3 K/UL (ref 0.9–3.6)
LYMPHOCYTES NFR BLD: 20 % (ref 21–52)
MCH RBC QN AUTO: 31.9 PG (ref 24–34)
MCHC RBC AUTO-ENTMCNC: 32.7 G/DL (ref 31–37)
MCV RBC AUTO: 97.5 FL (ref 78–100)
MONOCYTES # BLD: 0.8 K/UL (ref 0.05–1.2)
MONOCYTES NFR BLD: 12 % (ref 3–10)
NEUTS SEG # BLD: 4.3 K/UL (ref 1.8–8)
NEUTS SEG NFR BLD: 64 % (ref 40–73)
NRBC # BLD: 0 K/UL (ref 0–0.01)
NRBC BLD-RTO: 0 PER 100 WBC
PLATELET # BLD AUTO: 201 K/UL (ref 135–420)
PMV BLD AUTO: 10 FL (ref 9.2–11.8)
POTASSIUM SERPL-SCNC: 4.7 MMOL/L (ref 3.5–5.5)
RBC # BLD AUTO: 3.67 M/UL (ref 4.35–5.65)
SODIUM SERPL-SCNC: 135 MMOL/L (ref 136–145)
WBC # BLD AUTO: 6.6 K/UL (ref 4.6–13.2)

## 2022-08-25 PROCEDURE — 85025 COMPLETE CBC W/AUTO DIFF WBC: CPT

## 2022-08-25 PROCEDURE — 74011250637 HC RX REV CODE- 250/637: Performed by: EMERGENCY MEDICINE

## 2022-08-25 PROCEDURE — 71045 X-RAY EXAM CHEST 1 VIEW: CPT

## 2022-08-25 PROCEDURE — 83605 ASSAY OF LACTIC ACID: CPT

## 2022-08-25 PROCEDURE — 74011250637 HC RX REV CODE- 250/637: Performed by: NURSE PRACTITIONER

## 2022-08-25 PROCEDURE — 99024 POSTOP FOLLOW-UP VISIT: CPT | Performed by: NURSE PRACTITIONER

## 2022-08-25 PROCEDURE — 87040 BLOOD CULTURE FOR BACTERIA: CPT

## 2022-08-25 PROCEDURE — 74011250636 HC RX REV CODE- 250/636: Performed by: NURSE PRACTITIONER

## 2022-08-25 PROCEDURE — 74011000250 HC RX REV CODE- 250

## 2022-08-25 PROCEDURE — 74011000258 HC RX REV CODE- 258: Performed by: EMERGENCY MEDICINE

## 2022-08-25 PROCEDURE — 65270000029 HC RM PRIVATE

## 2022-08-25 PROCEDURE — 93971 EXTREMITY STUDY: CPT

## 2022-08-25 PROCEDURE — 99285 EMERGENCY DEPT VISIT HI MDM: CPT

## 2022-08-25 PROCEDURE — 80048 BASIC METABOLIC PNL TOTAL CA: CPT

## 2022-08-25 PROCEDURE — 74011250636 HC RX REV CODE- 250/636: Performed by: EMERGENCY MEDICINE

## 2022-08-25 RX ORDER — MORPHINE SULFATE 4 MG/ML
4 INJECTION, SOLUTION INTRAMUSCULAR; INTRAVENOUS
Status: COMPLETED | OUTPATIENT
Start: 2022-08-25 | End: 2022-08-25

## 2022-08-25 RX ORDER — OXYCODONE AND ACETAMINOPHEN 5; 325 MG/1; MG/1
TABLET ORAL
COMMUNITY
Start: 2022-08-22 | End: 2022-08-25

## 2022-08-25 RX ORDER — CEPHALEXIN 500 MG/1
CAPSULE ORAL
Status: ON HOLD | COMMUNITY
Start: 2022-08-04 | End: 2022-08-26

## 2022-08-25 RX ORDER — ACETAMINOPHEN 325 MG/1
650 TABLET ORAL
Status: DISCONTINUED | OUTPATIENT
Start: 2022-08-25 | End: 2022-08-28 | Stop reason: HOSPADM

## 2022-08-25 RX ORDER — DILTIAZEM HYDROCHLORIDE 30 MG/1
240 TABLET, FILM COATED ORAL
Status: CANCELLED | OUTPATIENT
Start: 2022-08-25 | End: 2022-08-26

## 2022-08-25 RX ORDER — FINASTERIDE 5 MG/1
5 TABLET, FILM COATED ORAL DAILY
Status: DISCONTINUED | OUTPATIENT
Start: 2022-08-26 | End: 2022-08-28 | Stop reason: HOSPADM

## 2022-08-25 RX ORDER — WATER FOR INJECTION,STERILE
VIAL (ML) INJECTION
Status: COMPLETED
Start: 2022-08-25 | End: 2022-08-25

## 2022-08-25 RX ORDER — DILTIAZEM HYDROCHLORIDE 240 MG/1
240 CAPSULE, COATED, EXTENDED RELEASE ORAL DAILY
Status: DISCONTINUED | OUTPATIENT
Start: 2022-08-26 | End: 2022-08-28 | Stop reason: HOSPADM

## 2022-08-25 RX ORDER — MELATONIN
1000 DAILY
Status: DISCONTINUED | OUTPATIENT
Start: 2022-08-26 | End: 2022-08-28 | Stop reason: HOSPADM

## 2022-08-25 RX ORDER — DOCUSATE SODIUM 100 MG/1
100 CAPSULE, LIQUID FILLED ORAL 2 TIMES DAILY
Status: DISCONTINUED | OUTPATIENT
Start: 2022-08-25 | End: 2022-08-28 | Stop reason: HOSPADM

## 2022-08-25 RX ORDER — LANOLIN ALCOHOL/MO/W.PET/CERES
1 CREAM (GRAM) TOPICAL
Status: DISCONTINUED | OUTPATIENT
Start: 2022-08-26 | End: 2022-08-28 | Stop reason: HOSPADM

## 2022-08-25 RX ORDER — CARVEDILOL 6.25 MG/1
6.25 TABLET ORAL 2 TIMES DAILY WITH MEALS
Status: DISCONTINUED | OUTPATIENT
Start: 2022-08-25 | End: 2022-08-28 | Stop reason: HOSPADM

## 2022-08-25 RX ORDER — PANTOPRAZOLE SODIUM 40 MG/1
40 TABLET, DELAYED RELEASE ORAL
Status: DISCONTINUED | OUTPATIENT
Start: 2022-08-26 | End: 2022-08-28 | Stop reason: HOSPADM

## 2022-08-25 RX ORDER — ONDANSETRON 4 MG/1
4 TABLET, ORALLY DISINTEGRATING ORAL
Status: DISCONTINUED | OUTPATIENT
Start: 2022-08-25 | End: 2022-08-28 | Stop reason: HOSPADM

## 2022-08-25 RX ORDER — HYDROCODONE BITARTRATE AND ACETAMINOPHEN 5; 325 MG/1; MG/1
1 TABLET ORAL
Status: DISCONTINUED | OUTPATIENT
Start: 2022-08-25 | End: 2022-08-27

## 2022-08-25 RX ORDER — NEOMYCIN SULFATE, POLYMYXIN B SULFATE, HYDROCORTISONE 3.5; 10000; 1 MG/ML; [USP'U]/ML; MG/ML
SOLUTION/ DROPS AURICULAR (OTIC)
Status: ON HOLD | COMMUNITY
Start: 2021-09-07 | End: 2022-08-26

## 2022-08-25 RX ORDER — BISACODYL 5 MG
5 TABLET, DELAYED RELEASE (ENTERIC COATED) ORAL DAILY PRN
Status: DISCONTINUED | OUTPATIENT
Start: 2022-08-25 | End: 2022-08-28 | Stop reason: HOSPADM

## 2022-08-25 RX ORDER — CALCIUM CARBONATE 500(1250)
TABLET ORAL
COMMUNITY

## 2022-08-25 RX ORDER — DILTIAZEM HYDROCHLORIDE 240 MG/1
240 CAPSULE, COATED, EXTENDED RELEASE ORAL
Status: COMPLETED | OUTPATIENT
Start: 2022-08-25 | End: 2022-08-25

## 2022-08-25 RX ADMIN — VANCOMYCIN HYDROCHLORIDE 2000 MG: 1 INJECTION, POWDER, LYOPHILIZED, FOR SOLUTION INTRAVENOUS at 19:50

## 2022-08-25 RX ADMIN — WATER 10 ML: 1 INJECTION INTRAMUSCULAR; INTRAVENOUS; SUBCUTANEOUS at 20:02

## 2022-08-25 RX ADMIN — CARVEDILOL 6.25 MG: 6.25 TABLET, FILM COATED ORAL at 19:08

## 2022-08-25 RX ADMIN — MORPHINE SULFATE 4 MG: 4 INJECTION, SOLUTION INTRAMUSCULAR; INTRAVENOUS at 18:03

## 2022-08-25 RX ADMIN — PIPERACILLIN AND TAZOBACTAM 3.38 G: 3; .375 INJECTION, POWDER, FOR SOLUTION INTRAVENOUS at 16:34

## 2022-08-25 RX ADMIN — APIXABAN 5 MG: 5 TABLET, FILM COATED ORAL at 19:08

## 2022-08-25 RX ADMIN — DOCUSATE SODIUM 100 MG: 100 CAPSULE, LIQUID FILLED ORAL at 19:08

## 2022-08-25 RX ADMIN — DILTIAZEM HYDROCHLORIDE 240 MG: 240 CAPSULE, COATED, EXTENDED RELEASE ORAL at 17:27

## 2022-08-25 NOTE — PROGRESS NOTES
5316- patient arrived to room 257 alert and oriented and ambulatory, ambulated from stretcher to bathroom then to bed with cane. Vss, tele applied.  RLE edematous, red, purple, warm to touch

## 2022-08-25 NOTE — ED TRIAGE NOTES
Pt states he had a right knee replacement on 8/17, states he had some issues with bleeding so he was admitted to the ICU overnight for the bleeding. Pt had a follow up with ortho, states he had some redness in his lower right leg so he was started on a second round of abx 2 days ago. Pt states ortho sent him to have a doppler study to check for a blood clot, he had that done today, it did not show any clots.   Pt then went to physical therapy, they sent him to the ED

## 2022-08-25 NOTE — PROGRESS NOTES
Name: Angie Frye    : 1950    Weight Loss Metrics 2022 2022 3/15/2022 3/15/2021 3/16/2020 2019 2018   Today's Wt 309 lb 299 lb 1.6 oz 290 lb 270 lb 270 lb 270 lb 245 lb   BMI 37.61 kg/m2 36.41 kg/m2 35.3 kg/m2 32.87 kg/m2 32.87 kg/m2 32.87 kg/m2 29.82 kg/m2       There is no height or weight on file to calculate BMI. Service Dept: [facility]    Patient's Pharmacies:    [id number] [address]  [address]  [place]  [address]  Phone: [phone] Fax: [phone] [address] [facility] [place] [facility] [address]  Phone: [phone] Fax: [phone]       Chief Complaint   Patient presents with    Surgical Follow-up    Knee Pain       HPI:  Patient follows up today for reevaluation after a right total knee replacement done on 2022. Unfortunately he had significant amount of postoperative bleeding and was readmitted to the hospital because of that right after the surgery. He was instructed to stop his Eliquis and states today that he has not yet restarted it. He has not had any more bleeding since his discharge from the hospital.  He states that when he saw Dr. Toshia Calderon 3 days ago he was placed on oral Keflex because of some question about possible cellulitis. He started that 2 days ago. He says that he has been having some low-grade fevers at night and a lot of difficulty with GI upset including nausea and vomiting that he attributed to use of his Percocet. I switched him to [address] [place] [address] yesterday. He says that it works sufficiently for his pain and it has not caused any nausea vomiting or diarrhea to this point. He continues to wear his AAMIR stockings and is ambulating with a cane today. There were no vitals taken for this visit.    No Known Allergies   Current Outpatient Medications   Medication Sig Dispense Refill neomycin-polymyxin-hydrocortisone (CORTISPORIN) otic solution 4 drops into affected ear      oxyCODONE-acetaminophen (PERCOCET) 5-325 mg per tablet       cephALEXin (KEFLEX) 500 mg capsule       calcium carbonate (OS-CHARLOTTE) 500 mg calcium (1,250 mg) tablet 2 tablet      HYDROcodone-acetaminophen (NORCO) 5-325 mg per tablet Take 1 Tablet by mouth every six (6) hours as needed for Pain for up to 8 days. Max Daily Amount: 4 Tablets. 30 Tablet 0    cephALEXin (KEFLEX) 500 mg capsule Take 1 Capsule by mouth four (4) times daily. 28 Capsule 0    cephALEXin (KEFLEX) 500 mg capsule Take 1 Capsule by mouth four (4) times daily. 28 Capsule 0    prednisoLONE acetate (PRED FORTE) 1 % ophthalmic suspension prednisolone acetate 1 % eye drops,suspension   instill 1 drop four times a day IN OPERATIVE EYE STARTING 1 DAY P...  (REFER TO PRESCRIPTION NOTES). tobramycin (TOBREX) 0.3 % ophthalmic solution       tropicamide (mydRIACYL) 1 % ophthalmic solution       ondansetron (ZOFRAN ODT) 4 mg disintegrating tablet Take 1 Tablet by mouth every eight (8) hours as needed for Nausea, Vomiting or Nausea or Vomiting for up to 20 doses. 20 Tablet 0    apixaban (ELIQUIS) 5 mg tablet Take 5 mg by mouth two (2) times a day. acetaminophen 325 mg cap Tylenol 325 mg capsule   PRN      pantoprazole (PROTONIX) 40 mg tablet       finasteride (PROSCAR) 5 mg tablet TAKE 1 TABLET DAILY 90 Tab 3    carvedilol (COREG) 6.25 mg tablet Take  by mouth two (2) times daily (with meals). docusate sodium (COLACE) 100 mg capsule Take 100 mg by mouth. cholecalciferol (VITAMIN D3) 25 mcg (1,000 unit) cap Take  by mouth daily. ferrous sulfate 325 mg (65 mg iron) tablet Take  by mouth Daily (before breakfast).       dilTIAZem CD (CARDIZEM CD) 240 mg ER capsule         Patient Active Problem List   Diagnosis Code    Prostate nodule N40.2    BPH with obstruction/lower urinary tract symptoms N40.1, N13.8    Recurrent UTI N39.0    OA (osteoarthritis) of knee M17.10    Post-operative complication J86. 9XXA    Chronic atrial fibrillation (HCC) I48.20      History reviewed. No pertinent family history. Social History     Socioeconomic History    Marital status:    Tobacco Use    Smoking status: Never    Smokeless tobacco: Never   Vaping Use    Vaping Use: Never used   Substance and Sexual Activity    Alcohol use: No      Past Surgical History:   Procedure Laterality Date    HX PARATHYROIDECTOMY        Past Medical History:   Diagnosis Date    Chronic atrial fibrillation (HCC)     Chronic diarrhea     HLD (hyperlipidemia)     HTN (hypertension)     Hyperparathyroidism (HCC)     Impaired glucose tolerance     Obesity     Pleural effusion         I have reviewed and agree with [facility] and ROS and intake form in chart and the record furthermore I have reviewed prior medical record(s) regarding this patients care during this appointment. Physical Exam:  On physical exam today his ABD pads and gauze were removed from the right knee. The incision itself appears to be healing well with some resolving ecchymosis around the knee and upper leg. However he has significant erythema over the anterior lower leg, very tight and tender in the calf with a positive Homans' sign. Range of motion appears to be approximately -5/95. Encounter Diagnoses     ICD-10-CM ICD-9-CM   1. Status post right knee replacement  Z96.651 V43.65       As part of continued conservative pain management options the patient was advised to utilize Tylenol or OTC NSAIDS as long as it is not medically contraindicated. Return to Office:   Today I recommended to the patient that we get PVL study of his right lower extremity to rule out DVT as the source of the erythema and pain in his calf region, particularly since he has been off of his Eliquis for the past several days.   That study is scheduled for 1030 this morning so he is going to leave our clinic and go straight over to [facility] for that test.  I told him I give him a call after we get it resulted. I would like to definitely see him in at least 3 more weeks to reassess his status. Henry Me, ARNP       ADDENDUM:  Spoke with vascular lab and patient was negative for DVT. Patient was notified and instructed to proceed with PT and to resume his Eliquis. Will continue on his antibiotics as prescribed by Dr. Clarisa Ronquillo.

## 2022-08-25 NOTE — H&P
History and Physical    Subjective:     Lynette Fish is a 71 y.o. male with a past medical history significant for atrial fibrillation (on Eliquis) HTN, HLP, BPH, anemia, and a recent elective right TKA on 8/17/2022, and postoperative bleeding who was referred today to the ED from his physical therapy department for concerns about possible cellulitis in his right leg. History is obtained from the patient in the ED. Patient reports he has been having increased redness, swelling, warmth, and pain in his right leg in the last 3 days. He saw Dr. Carmel Esquivel, who placed him on Keflex tablets. Prior to today, over the previous weekend which was approximately 6 to 7 days ago, he had had some intermittent fevers with a subjective temperature of up to 101.4, with chills, which she had managed with Tylenol. He also reports some nausea and vomiting that he associates with his Percocet but since Percocet was switched to [place], his GI symptoms got better. No chest pain, shortness of breath, abdominal pain or distention. No urinary symptoms. No other complaints verbalized. Hospitalist was asked to admit. Past Medical History:   Diagnosis Date    Chronic atrial fibrillation (HCC)     Chronic diarrhea     HLD (hyperlipidemia)     HTN (hypertension)     Hyperparathyroidism (HCC)     Impaired glucose tolerance     Obesity     Pleural effusion       Past Surgical History:   Procedure Laterality Date    HX PARATHYROIDECTOMY       No family history on file. Social History     Tobacco Use    Smoking status: Never    Smokeless tobacco: Never   Substance Use Topics    Alcohol use: No       Prior to Admission medications    Medication Sig Start Date End Date Taking?  Authorizing Provider   calcium carbonate (OS-CHARLOTTE) 500 mg calcium (1,250 mg) tablet 2 tablet    Provider, Historical   neomycin-polymyxin-hydrocortisone (CORTISPORIN) otic solution 4 drops into affected ear 9/7/21   Provider, Historical   cephALEXin (KEFLEX) 500 mg capsule  8/4/22   Provider, Historical   HYDROcodone-acetaminophen (NORCO) 5-325 mg per tablet Take 1 Tablet by mouth every six (6) hours as needed for Pain for up to 8 days. Max Daily Amount: 4 Tablets. 8/23/22 8/31/22  SAAD Galan   cephALEXin (KEFLEX) 500 mg capsule Take 1 Capsule by mouth four (4) times daily. 8/22/22   Emelia JAIME MD   cephALEXin (KEFLEX) 500 mg capsule Take 1 Capsule by mouth four (4) times daily. 8/22/22   Emelia JAIME MD   prednisoLONE acetate (PRED FORTE) 1 % ophthalmic suspension prednisolone acetate 1 % eye drops,suspension   instill 1 drop four times a day IN OPERATIVE EYE STARTING 1 DAY P...  (REFER TO PRESCRIPTION NOTES). 4/1/22   Provider, Historical   tobramycin (TOBREX) 0.3 % ophthalmic solution  4/1/22   Provider, Historical   tropicamide (mydRIACYL) 1 % ophthalmic solution  4/1/22   Provider, Historical   ondansetron (ZOFRAN ODT) 4 mg disintegrating tablet Take 1 Tablet by mouth every eight (8) hours as needed for Nausea, Vomiting or Nausea or Vomiting for up to 20 doses. 8/4/22   SAAD Galan   apixaban (ELIQUIS) 5 mg tablet Take 5 mg by mouth two (2) times a day. Provider, Historical   acetaminophen 325 mg cap Tylenol 325 mg capsule   PRN    Provider, Historical   pantoprazole (PROTONIX) 40 mg tablet  3/7/22   Provider, Historical   finasteride (PROSCAR) 5 mg tablet TAKE 1 TABLET DAILY 3/16/20   Jalen Villarreal MD   carvedilol (COREG) 6.25 mg tablet Take  by mouth two (2) times daily (with meals). Provider, Historical   docusate sodium (COLACE) 100 mg capsule Take 100 mg by mouth. 10/6/17   Provider, Historical   cholecalciferol (VITAMIN D3) 25 mcg (1,000 unit) cap Take  by mouth daily. Provider, Historical   ferrous sulfate 325 mg (65 mg iron) tablet Take  by mouth Daily (before breakfast).     Provider, Historical   dilTIAZem CD (CARDIZEM CD) 240 mg ER capsule  6/25/18   Provider, Historical     No Known Allergies     Review of Systems:  14 points ROS was reviewed with patient, reported as negative except as mentioned in HPI. Objective:     Vitals:  Visit Vitals  BP (!) 142/92 (BP 1 Location: Left upper arm, BP Patient Position: Lying)   Pulse 97   Temp 97.1 °F (36.2 °C)   Resp 20   Ht 6' 4\" (1.93 m)   Wt 140.2 kg (309 lb)   SpO2 99%   BMI 37.61 kg/m²       Physical Exam:  General: Morbidly obese elderly male not ill or toxic appearing, awake, alert, oriented x4, very pleasant, cooperative, in no distress. On room air. Head: Normocephalic, without obvious abnormality, atraumatic. Eyes: Conjunctivae/corneas clear. Pupils equal, round, reactive to light. No scleral icterus. Extraocular movements intact. Lungs: Clear to auscultation bilaterally, no wheezes, no crackles. Chest wall: No tenderness or deformity. Heart:  Regular rate and rhythm, S1, S2 normal, no murmur, click, rub, or gallop. Abdomen: soft, non-tender, bowel sounds active, no palpable masses. Back:  No spine tenderness to palpation  Extremities: Right leg appears erythematous, warm to touch, tender to touch, and edematous, distal dorsalis pedis pulses palpable and 2+ symmetric pulses: 2+ symmetric in all extremities. Right knee surgical incision with approximated edges, no dehiscence, no drainage. There is also a \"track patch\" device attached to the lateral aspect of his RLE which was placed by Dr Yvrose Louis. other extremities are within normal limits. Skin: Not pale, not jaundiced, overall skin color, texture, appropriate for ethnicity, turgor normal.   Lymph nodes: Cervical nodes normal.  Neurologic: Awake and oriented, x4. No new focal neurological deficits. Labs:  No results found for this or any previous visit (from the past 24 hour(s)).     Imaging:  DUPLEX LOWER EXT VENOUS RIGHT    Result Date: 8/25/2022  · No evidence of deep vein thrombosis in the right lower extremity (poor visualization of a segment of the right distal femoral vein, flow characteristics suggest gross patency of the distal femoral vein). Poor visualization of the peroneal vein in the right calf. · Pulsatile venous flow bilaterally, which can sometimes suggest elevated right heart pressure. Assessment & Plan:     #1: Right lower extremity cellulitis:  -admit to inpatient, medical unit.  -Start IV antibiotics with Zosyn 3.375 mg IV every 8 hours, renally dosed. -Vancomycin, per pharmacy dosing protocol.  -2 sets blood cultures obtained in the ED.  -His lactic acid is 1.1, wbc is 6.  --venous dopplers performed today was negative for DVT. -monitor vitals, follow blood cultures, CBC, BMP daily  -Norco prn for pain. -PT eval and treat    #2: Atrial fibrillation:  -chronic issue, rates controlled. -cont diltiazem, and Eliquis. #3: Hypertension:  -chronic issue, fairly stable, cont carvedilol. #4: Anemia:  -chronic issue, cont ferrous sulfate. -h/h stable at 11/35. #5: BPH:  -cont finasteride. VTE prophylaxis: Eliquis  Code Status: Full code  Total time spent: 35 minutes  Plan of care discussed with patient, nursing staff.

## 2022-08-25 NOTE — ED NOTES
TRANSFER - OUT REPORT:    Verbal report given to Mayur Albarado on Robert Strong  being transferred to  for routine progression of care       Report consisted of patients Situation, Background, Assessment and   Recommendations(SBAR). Information from the following report:  ED Summary  was reviewed with the receiving nurse. Lines:   Peripheral IV 08/25/22 Right Hand (Active)        Opportunity for questions and clarification was provided.       Patient transported with:   Monitor  Registered Nurse

## 2022-08-25 NOTE — PROGRESS NOTES
Problem: Patient Education: Go to Patient Education Activity  Goal: Patient/Family Education  Outcome: Progressing Towards Goal     Problem: Pain  Goal: *Control of Pain  Outcome: Progressing Towards Goal  Goal: *PALLIATIVE CARE:  Alleviation of Pain  Outcome: Progressing Towards Goal     Problem: Patient Education: Go to Patient Education Activity  Goal: Patient/Family Education  Outcome: Progressing Towards Goal     Problem: Falls - Risk of  Goal: *Absence of Falls  Description: Document Toyin Fall Risk and appropriate interventions in the flowsheet.   Outcome: Progressing Towards Goal  Note: Fall Risk Interventions:                                Problem: Patient Education: Go to Patient Education Activity  Goal: Patient/Family Education  Outcome: Progressing Towards Goal

## 2022-08-25 NOTE — ED PROVIDER NOTES
EMERGENCY DEPARTMENT HISTORY AND PHYSICAL EXAM      Date: 8/25/2022  Patient Name: Umm Slaughter    History of Presenting Illness     Chief Complaint   Patient presents with    Skin Problem     Cellulitis         History Provided By: Patient    HPI: Umm Slaughter, 71 y.o. male obesity, chronic atrial fibrillation, and on Eliquis, hyperlipidemia, hyperparathyroidism, hypertension, prediabetes and is s/p total right knee replacement 8/17 by Dr. Yvette Moeller. Postoperatively the ETO was complicated with bleeding that requires admission for observation. Patient had follow-up with Ortho and had some redness to right lower leg and was started on antibiotics. He was seen by Ortho and was sent for ultrasound of the leg to rule out blood clot and he reports ultrasound was negative. He then left the lab to go for physical therapy and states was told the right leg swelling and redness appeared worse and patient referred to ED. Patient denies fever chills nausea or vomiting. He has not started on his Eliquis yet since surgery. He denies any other trauma to the leg. There are no other complaints, changes, or physical findings at this time. PCP: Manish Mathew MD    No current facility-administered medications on file prior to encounter. Current Outpatient Medications on File Prior to Encounter   Medication Sig Dispense Refill    calcium carbonate (OS-CHARLOTTE) 500 mg calcium (1,250 mg) tablet 2 tablet      neomycin-polymyxin-hydrocortisone (CORTISPORIN) otic solution 4 drops into affected ear      cephALEXin (KEFLEX) 500 mg capsule       [DISCONTINUED] oxyCODONE-acetaminophen (PERCOCET) 5-325 mg per tablet       HYDROcodone-acetaminophen (NORCO) 5-325 mg per tablet Take 1 Tablet by mouth every six (6) hours as needed for Pain for up to 8 days. Max Daily Amount: 4 Tablets. 30 Tablet 0    cephALEXin (KEFLEX) 500 mg capsule Take 1 Capsule by mouth four (4) times daily.  28 Capsule 0    cephALEXin (KEFLEX) 500 mg capsule Take 1 Capsule by mouth four (4) times daily. 28 Capsule 0    prednisoLONE acetate (PRED FORTE) 1 % ophthalmic suspension prednisolone acetate 1 % eye drops,suspension   instill 1 drop four times a day IN OPERATIVE EYE STARTING 1 DAY P...  (REFER TO PRESCRIPTION NOTES). tobramycin (TOBREX) 0.3 % ophthalmic solution       tropicamide (mydRIACYL) 1 % ophthalmic solution       ondansetron (ZOFRAN ODT) 4 mg disintegrating tablet Take 1 Tablet by mouth every eight (8) hours as needed for Nausea, Vomiting or Nausea or Vomiting for up to 20 doses. 20 Tablet 0    apixaban (ELIQUIS) 5 mg tablet Take 5 mg by mouth two (2) times a day. acetaminophen 325 mg cap Tylenol 325 mg capsule   PRN      pantoprazole (PROTONIX) 40 mg tablet       finasteride (PROSCAR) 5 mg tablet TAKE 1 TABLET DAILY 90 Tab 3    carvedilol (COREG) 6.25 mg tablet Take  by mouth two (2) times daily (with meals). docusate sodium (COLACE) 100 mg capsule Take 100 mg by mouth. cholecalciferol (VITAMIN D3) 25 mcg (1,000 unit) cap Take  by mouth daily. ferrous sulfate 325 mg (65 mg iron) tablet Take  by mouth Daily (before breakfast). dilTIAZem CD (CARDIZEM CD) 240 mg ER capsule          Past History     Past Medical History:  Past Medical History:   Diagnosis Date    Chronic atrial fibrillation (HCC)     Chronic diarrhea     HLD (hyperlipidemia)     HTN (hypertension)     Hyperparathyroidism (HCC)     Impaired glucose tolerance     Obesity     Pleural effusion        Past Surgical History:  Past Surgical History:   Procedure Laterality Date    HX PARATHYROIDECTOMY         Family History:  No family history on file.     Social History:  Social History     Tobacco Use    Smoking status: Never    Smokeless tobacco: Never   Vaping Use    Vaping Use: Never used   Substance Use Topics    Alcohol use: No       Allergies:  No Known Allergies    Review of Systems   Review of Systems   All other systems reviewed and are negative. Physical Exam   Physical Exam  Vitals and nursing note reviewed. Constitutional:       General: He is not in acute distress. Appearance: He is well-developed. He is obese. He is not diaphoretic. Comments: Obese male in no acute distress. HENT:      Head: Normocephalic and atraumatic. No right periorbital erythema or left periorbital erythema. Jaw: No trismus. Right Ear: External ear normal. No drainage or swelling. Tympanic membrane is not perforated, erythematous or bulging. Left Ear: External ear normal. No drainage or swelling. Tympanic membrane is not perforated, erythematous or bulging. Nose: Nose normal. No mucosal edema or rhinorrhea. Right Sinus: No maxillary sinus tenderness or frontal sinus tenderness. Left Sinus: No maxillary sinus tenderness or frontal sinus tenderness. Mouth/Throat:      Mouth: No oral lesions. Dentition: No dental abscesses. Pharynx: Uvula midline. No oropharyngeal exudate, posterior oropharyngeal erythema or uvula swelling. Tonsils: No tonsillar abscesses. Eyes:      General: No scleral icterus. Right eye: No discharge. Left eye: No discharge. Conjunctiva/sclera: Conjunctivae normal.   Cardiovascular:      Rate and Rhythm: Tachycardia present. Rhythm irregular. Heart sounds: Normal heart sounds. No murmur heard. No friction rub. No gallop. Pulmonary:      Effort: Pulmonary effort is normal. No tachypnea, accessory muscle usage or respiratory distress. Breath sounds: Normal breath sounds. No decreased breath sounds, wheezing, rhonchi or rales. Abdominal:      General: Bowel sounds are normal. There is no distension. Palpations: Abdomen is soft. Tenderness: There is no abdominal tenderness. Musculoskeletal:         General: Swelling and tenderness present. Normal range of motion. Cervical back: Normal range of motion and neck supple.       Right lower leg: No edema. Comments: Right knee with surgical wound with slight ecchymosis lateral edges of the knee. There is minimal tenderness to palpation and patient has full range of motion with some discomfort. The right leg and foot however. Erythematous with the leg worse than the foot. It is warm to touch and the erythema is more extensive distal anterior leg. Jonda Goo' sign is negative. The skin over the leg is warm to touch. Neurovascularly the foot is intact. Lymphadenopathy:      Cervical: No cervical adenopathy. Skin:     General: Skin is warm and dry. Neurological:      Mental Status: He is alert and oriented to person, place, and time. Psychiatric:         Judgment: Judgment normal.       Lab and Diagnostic Study Results   Labs -     Recent Results (from the past 12 hour(s))   CBC WITH AUTOMATED DIFF    Collection Time: 08/25/22  3:25 PM   Result Value Ref Range    WBC 6.6 4.6 - 13.2 K/uL    RBC 3.67 (L) 4.35 - 5.65 M/uL    HGB 11.7 (L) 13.0 - 16.0 g/dL    HCT 35.8 (L) 36.0 - 48.0 %    MCV 97.5 78.0 - 100.0 FL    MCH 31.9 24.0 - 34.0 PG    MCHC 32.7 31.0 - 37.0 g/dL    RDW 14.1 11.6 - 14.5 %    PLATELET 829 262 - 514 K/uL    MPV 10.0 9.2 - 11.8 FL    NRBC 0.0 0.0  WBC    ABSOLUTE NRBC 0.00 0.00 - 0.01 K/uL    NEUTROPHILS 64 40 - 73 %    LYMPHOCYTES 20 (L) 21 - 52 %    MONOCYTES 12 (H) 3 - 10 %    EOSINOPHILS 2 0 - 5 %    BASOPHILS 1 0 - 2 %    IMMATURE GRANULOCYTES 1 (H) 0 - 0.5 %    ABS. NEUTROPHILS 4.3 1.8 - 8.0 K/UL    ABS. LYMPHOCYTES 1.3 0.9 - 3.6 K/UL    ABS. MONOCYTES 0.8 0.05 - 1.2 K/UL    ABS. EOSINOPHILS 0.2 0.0 - 0.4 K/UL    ABS. BASOPHILS 0.0 0.0 - 0.1 K/UL    ABS. IMM.  GRANS. 0.1 (H) 0.00 - 0.04 K/UL    DF AUTOMATED     METABOLIC PANEL, BASIC    Collection Time: 08/25/22  3:25 PM   Result Value Ref Range    Sodium 135 (L) 136 - 145 mmol/L    Potassium 4.7 3.5 - 5.5 mmol/L    Chloride 99 (L) 100 - 111 mmol/L    CO2 29 21 - 32 mmol/L    Anion gap 7 3.0 - 18.0 mmol/L    Glucose 117 (H) 74 - 99 mg/dL    BUN 14 7 - 18 mg/dL    Creatinine 1.12 0.60 - 1.30 mg/dL    BUN/Creatinine ratio 13 12 - 20      GFR est AA >60 >60 ml/min/1.73m2    GFR est non-AA >60 >60 ml/min/1.73m2    Calcium 8.9 8.5 - 10.1 mg/dL   LACTIC ACID    Collection Time: 08/25/22  3:25 PM   Result Value Ref Range    Lactic acid 1.1 0.4 - 2.0 mmol/L       Radiologic Studies -   @[email or web address]@  CT Results  (Last 48 hours)      None          CXR Results  (Last 48 hours)      None            Medical Decision Making and ED Course   Differential Diagnosis & Medical Decision Making Provider Note:       - I am the first provider for this patient. I reviewed the vital signs, available nursing notes, past medical history, past surgical history, family history and social history. The patients presenting problems have been discussed, and they are in agreement with the care plan formulated and outlined with them. I have encouraged them to ask questions as they arise throughout their visit. Vital Signs-Reviewed the patient's vital signs. Patient Vitals for the past 12 hrs:   Temp Pulse Resp BP SpO2   08/25/22 1727 -- (!) 101 -- (!) 149/80 --   08/25/22 1725 -- (!) 108 18 (!) 149/80 97 %   08/25/22 1503 97.1 °F (36.2 °C) 97 20 (!) 142/92 99 %       ED Course:   Consult with Dr. Richardson Staff was obtained and he recommended admission to hospitalist for IV antibiotics. Procedures     Disposition   Disposition: Admitted to Floor Medical Floor the case was discussed with the admitting hospitalist, Olayinka NP, Attending, Jimmie Platt. Diagnosis/Clinical Impression     Clinical Impression:   1. Cellulitis of right lower extremity        Attestations: I, Michael Nixon MD, am the primary clinician of record. Please note that this dictation was completed with [employer], the [employer] voice recognition software.   Quite often unanticipated grammatical, syntax, homophones, and other interpretive errors are inadvertently transcribed by the computer software. Please disregard these errors. Please excuse any errors that have escaped final proofreading. Thank you.

## 2022-08-26 LAB
ANION GAP SERPL CALC-SCNC: 9 MMOL/L (ref 3–18)
BASOPHILS # BLD: 0 K/UL (ref 0–0.1)
BASOPHILS NFR BLD: 1 % (ref 0–2)
BUN SERPL-MCNC: 14 MG/DL (ref 7–18)
BUN/CREAT SERPL: 13 (ref 12–20)
CA-I BLD-MCNC: 8.6 MG/DL (ref 8.5–10.1)
CHLORIDE SERPL-SCNC: 100 MMOL/L (ref 100–111)
CO2 SERPL-SCNC: 26 MMOL/L (ref 21–32)
CREAT SERPL-MCNC: 1.05 MG/DL (ref 0.6–1.3)
DIFFERENTIAL METHOD BLD: ABNORMAL
EOSINOPHIL # BLD: 0.2 K/UL (ref 0–0.4)
EOSINOPHIL NFR BLD: 3 % (ref 0–5)
ERYTHROCYTE [DISTWIDTH] IN BLOOD BY AUTOMATED COUNT: 14 % (ref 11.6–14.5)
GLUCOSE SERPL-MCNC: 104 MG/DL (ref 74–99)
HCT VFR BLD AUTO: 32.1 % (ref 36–48)
HGB BLD-MCNC: 10.7 G/DL (ref 13–16)
IMM GRANULOCYTES # BLD AUTO: 0.1 K/UL (ref 0–0.04)
IMM GRANULOCYTES NFR BLD AUTO: 1 % (ref 0–0.5)
LYMPHOCYTES # BLD: 1.3 K/UL (ref 0.9–3.6)
LYMPHOCYTES NFR BLD: 21 % (ref 21–52)
MCH RBC QN AUTO: 31.7 PG (ref 24–34)
MCHC RBC AUTO-ENTMCNC: 33.3 G/DL (ref 31–37)
MCV RBC AUTO: 95 FL (ref 78–100)
MONOCYTES # BLD: 0.7 K/UL (ref 0.05–1.2)
MONOCYTES NFR BLD: 11 % (ref 3–10)
NEUTS SEG # BLD: 3.9 K/UL (ref 1.8–8)
NEUTS SEG NFR BLD: 63 % (ref 40–73)
NRBC # BLD: 0 K/UL (ref 0–0.01)
NRBC BLD-RTO: 0 PER 100 WBC
PLATELET # BLD AUTO: 188 K/UL (ref 135–420)
PMV BLD AUTO: 9.1 FL (ref 9.2–11.8)
POTASSIUM SERPL-SCNC: 4.5 MMOL/L (ref 3.5–5.5)
RBC # BLD AUTO: 3.38 M/UL (ref 4.35–5.65)
SODIUM SERPL-SCNC: 135 MMOL/L (ref 136–145)
WBC # BLD AUTO: 6.1 K/UL (ref 4.6–13.2)

## 2022-08-26 PROCEDURE — 85025 COMPLETE CBC W/AUTO DIFF WBC: CPT

## 2022-08-26 PROCEDURE — 74011250636 HC RX REV CODE- 250/636: Performed by: INTERNAL MEDICINE

## 2022-08-26 PROCEDURE — 97530 THERAPEUTIC ACTIVITIES: CPT

## 2022-08-26 PROCEDURE — 80048 BASIC METABOLIC PNL TOTAL CA: CPT

## 2022-08-26 PROCEDURE — 65270000029 HC RM PRIVATE

## 2022-08-26 PROCEDURE — 74011000258 HC RX REV CODE- 258: Performed by: NURSE PRACTITIONER

## 2022-08-26 PROCEDURE — 74011250637 HC RX REV CODE- 250/637: Performed by: EMERGENCY MEDICINE

## 2022-08-26 PROCEDURE — 36415 COLL VENOUS BLD VENIPUNCTURE: CPT

## 2022-08-26 PROCEDURE — 74011250636 HC RX REV CODE- 250/636: Performed by: NURSE PRACTITIONER

## 2022-08-26 PROCEDURE — 97161 PT EVAL LOW COMPLEX 20 MIN: CPT

## 2022-08-26 PROCEDURE — 74011250637 HC RX REV CODE- 250/637: Performed by: NURSE PRACTITIONER

## 2022-08-26 RX ORDER — FUROSEMIDE 10 MG/ML
20 INJECTION INTRAMUSCULAR; INTRAVENOUS ONCE
Status: COMPLETED | OUTPATIENT
Start: 2022-08-26 | End: 2022-08-26

## 2022-08-26 RX ADMIN — HYDROCODONE BITARTRATE AND ACETAMINOPHEN 1 TABLET: 5; 325 TABLET ORAL at 08:42

## 2022-08-26 RX ADMIN — DILTIAZEM HYDROCHLORIDE 240 MG: 240 CAPSULE, COATED, EXTENDED RELEASE ORAL at 08:42

## 2022-08-26 RX ADMIN — Medication 1000 UNITS: at 08:42

## 2022-08-26 RX ADMIN — HYDROCODONE BITARTRATE AND ACETAMINOPHEN 1 TABLET: 5; 325 TABLET ORAL at 23:48

## 2022-08-26 RX ADMIN — PIPERACILLIN AND TAZOBACTAM 3.38 G: 3; .375 INJECTION, POWDER, FOR SOLUTION INTRAVENOUS at 08:43

## 2022-08-26 RX ADMIN — CARVEDILOL 6.25 MG: 6.25 TABLET, FILM COATED ORAL at 08:42

## 2022-08-26 RX ADMIN — APIXABAN 5 MG: 5 TABLET, FILM COATED ORAL at 08:42

## 2022-08-26 RX ADMIN — PIPERACILLIN AND TAZOBACTAM 3.38 G: 3; .375 INJECTION, POWDER, FOR SOLUTION INTRAVENOUS at 17:04

## 2022-08-26 RX ADMIN — PANTOPRAZOLE SODIUM 40 MG: 40 TABLET, DELAYED RELEASE ORAL at 06:43

## 2022-08-26 RX ADMIN — DOCUSATE SODIUM 100 MG: 100 CAPSULE, LIQUID FILLED ORAL at 08:42

## 2022-08-26 RX ADMIN — CARVEDILOL 6.25 MG: 6.25 TABLET, FILM COATED ORAL at 17:04

## 2022-08-26 RX ADMIN — FERROUS SULFATE TAB 325 MG (65 MG ELEMENTAL FE) 325 MG: 325 (65 FE) TAB at 08:42

## 2022-08-26 RX ADMIN — VANCOMYCIN HYDROCHLORIDE 1000 MG: 1 INJECTION, POWDER, LYOPHILIZED, FOR SOLUTION INTRAVENOUS at 20:01

## 2022-08-26 RX ADMIN — FUROSEMIDE 20 MG: 10 INJECTION, SOLUTION INTRAMUSCULAR; INTRAVENOUS at 11:14

## 2022-08-26 RX ADMIN — VANCOMYCIN HYDROCHLORIDE 1000 MG: 1 INJECTION, POWDER, LYOPHILIZED, FOR SOLUTION INTRAVENOUS at 08:42

## 2022-08-26 RX ADMIN — PIPERACILLIN AND TAZOBACTAM 3.38 G: 3; .375 INJECTION, POWDER, FOR SOLUTION INTRAVENOUS at 00:21

## 2022-08-26 RX ADMIN — HYDROCODONE BITARTRATE AND ACETAMINOPHEN 1 TABLET: 5; 325 TABLET ORAL at 17:04

## 2022-08-26 RX ADMIN — APIXABAN 5 MG: 5 TABLET, FILM COATED ORAL at 17:04

## 2022-08-26 RX ADMIN — FINASTERIDE 5 MG: 5 TABLET, FILM COATED ORAL at 08:42

## 2022-08-26 RX ADMIN — DOCUSATE SODIUM 100 MG: 100 CAPSULE, LIQUID FILLED ORAL at 17:04

## 2022-08-26 NOTE — PROGRESS NOTES
Comprehensive Nutrition Assessment    Type and Reason for Visit: Initial    Nutrition Recommendations/Plan:   Cardiac diet     Malnutrition Assessment:  Malnutrition Status:  No malnutrition (08/26/22 1525)    Context:  Acute illness     Findings of the 6 clinical characteristics of malnutrition:   Energy Intake:  No significant decrease in energy intake  Weight Loss:  No significant weight loss     Body Fat Loss:  No significant body fat loss,     Muscle Mass Loss:  No significant muscle mass loss,    Fluid Accumulation:  No significant fluid accumulation,     Strength:  Not performed     Nutrition Assessment:    72 yo male PMH: A-fib, HTN, HLP, BPH, anemia    Nutrition Related Findings: Morbidly obese BMI 36.7 at 301 lbs. Had recent right TKA on 8/17 and then developed possible cellulitis in right leg days later as cause of this admission. Started on vancomycin and zosyn. continues PT.  Wound Type: Surgical incision (recent right TKA on 8/17)    Recent Results (from the past 24 hour(s))   METABOLIC PANEL, BASIC    Collection Time: 08/26/22  5:37 AM   Result Value Ref Range    Sodium 135 (L) 136 - 145 mmol/L    Potassium 4.5 3.5 - 5.5 mmol/L    Chloride 100 100 - 111 mmol/L    CO2 26 21 - 32 mmol/L    Anion gap 9 3.0 - 18.0 mmol/L    Glucose 104 (H) 74 - 99 mg/dL    BUN 14 7 - 18 mg/dL    Creatinine 1.05 0.60 - 1.30 mg/dL    BUN/Creatinine ratio 13 12 - 20      GFR est AA >60 >60 ml/min/1.73m2    GFR est non-AA >60 >60 ml/min/1.73m2    Calcium 8.6 8.5 - 10.1 mg/dL   CBC WITH AUTOMATED DIFF    Collection Time: 08/26/22  5:37 AM   Result Value Ref Range    WBC 6.1 4.6 - 13.2 K/uL    RBC 3.38 (L) 4.35 - 5.65 M/uL    HGB 10.7 (L) 13.0 - 16.0 g/dL    HCT 32.1 (L) 36.0 - 48.0 %    MCV 95.0 78.0 - 100.0 FL    MCH 31.7 24.0 - 34.0 PG    MCHC 33.3 31.0 - 37.0 g/dL    RDW 14.0 11.6 - 14.5 %    PLATELET 093 353 - 425 K/uL    MPV 9.1 (L) 9.2 - 11.8 FL    NRBC 0.0 0.0  WBC    ABSOLUTE NRBC 0.00 0.00 - 0.01 K/uL NEUTROPHILS 63 40 - 73 %    LYMPHOCYTES 21 21 - 52 %    MONOCYTES 11 (H) 3 - 10 %    EOSINOPHILS 3 0 - 5 %    BASOPHILS 1 0 - 2 %    IMMATURE GRANULOCYTES 1 (H) 0 - 0.5 %    ABS. NEUTROPHILS 3.9 1.8 - 8.0 K/UL    ABS. LYMPHOCYTES 1.3 0.9 - 3.6 K/UL    ABS. MONOCYTES 0.7 0.05 - 1.2 K/UL    ABS. EOSINOPHILS 0.2 0.0 - 0.4 K/UL    ABS. BASOPHILS 0.0 0.0 - 0.1 K/UL    ABS. IMM. GRANS. 0.1 (H) 0.00 - 0.04 K/UL    DF AUTOMATED          Current Nutrition Intake & Therapies:  Average Meal Intake: %  Average Supplement Intake: None ordered  ADULT DIET Regular; Low Fat/Low Chol/High Fiber/RIGO    Anthropometric Measures:  Height: 6' 4\" (193 cm)  Ideal Body Weight (IBW): 202 lbs (92 kg)  Admission Body Weight: 301 lb  Current Body Wt:  136.5 kg (301 lb), 149 % IBW. Bed scale  Current BMI (kg/m2): 36.7        Weight Adjustment: No adjustment                 BMI Category: Obese class 2 (BMI 35.0-39. 9)    Estimated Daily Nutrient Needs:  Energy Requirements Based On: Kcal/kg (20-25 kcal/kg)  Weight Used for Energy Requirements: Admission (137 kg)  Energy (kcal/day): 4483-0138 kcal/day  Weight Used for Protein Requirements: Admission (0.8-1 g/kg)  Protein (g/day): 110-137 g/day  Method Used for Fluid Requirements: 1 ml/kcal  Fluid (ml/day): 2996-7054 mL/day    Nutrition Diagnosis:   No nutrition diagnosis at this time related to   as evidenced by      Nutrition Interventions:   Food and/or Nutrient Delivery: Continue current diet  Nutrition Education/Counseling: No recommendations at this time  Coordination of Nutrition Care: Continue to monitor while inpatient       Goals:     Goals: PO intake 75% or greater, by next RD assessment       Nutrition Monitoring and Evaluation:      Food/Nutrient Intake Outcomes: Food and nutrient intake  Physical Signs/Symptoms Outcomes: Meal time behavior, Weight    Follow up 9/2/2022    Discharge Planning:    Continue current diet    [facility]: [phone]

## 2022-08-26 NOTE — PROGRESS NOTES
Hospitalist Progress Note             Date of Service:  2022  NAME:  Edvin Langley  :  1950  MRN:  [id number]    Assessment & Plan:      #1: Right lower extremity cellulitis:  -admit to inpatient, medical unit.  -Start IV antibiotics with Zosyn 3.375 mg IV every 8 hours, renally dosed. -Vancomycin, per pharmacy dosing protocol.  -2 sets blood cultures obtained in the ED.  -His lactic acid is 1.1, wbc is 6.  --venous dopplers negative for DVT. -monitor vitals, follow blood cultures, CBC, BMP daily  -Norco prn for pain. -PT eval and treat  --- I suspect this is a combination of cellulitis and dependent edema, would continue abx, but also ask ortho to allow him to elevate this leg as much as possible     #2: Atrial fibrillation:  -chronic issue, rates controlled. -cont diltiazem, and Eliquis. #3: Hypertension:  -chronic issue, fairly stable, cont carvedilol. #4: Anemia:  -chronic issue, cont ferrous sulfate. -h/h stable at 35. #5: BPH:  -cont finasteride. Hospital Problems  Date Reviewed: 2022            Codes Class Noted POA    Chronic a-fib [facility]) ICD-10-CM: I48.20  ICD-9-CM: 427.31  2022 Unknown        Cellulitis of right leg ICD-10-CM: L03.115  ICD-9-CM: 682.6  2022 Unknown             Review of Systems:   A comprehensive review of systems was negative except for that written in the HPI. Had temps at home 100.4  Pain to distal leg        Vital Signs:    Last 24hrs VS reviewed since prior progress note.  Most recent are:  Visit Vitals  /67 (BP 1 Location: Left lower arm, BP Patient Position: At rest)   Pulse 84   Temp 97.3 °F (36.3 °C)   Resp 16   Ht 6' 4\" (1.93 m)   Wt 136.8 kg (301 lb 8 oz)   SpO2 97%   BMI 36.70 kg/m²         Intake/Output Summary (Last 24 hours) at 2022 0940  Last data filed at 2022 0849  Gross per 24 hour   Intake 580 ml   Output 1250 ml   Net -670 ml        Physical Examination:             General:          Alert, cooperative, no distress, appears stated age. HEENT:           Atraumatic, anicteric sclerae, pink conjunctivae                          No oral ulcers, mucosa moist, throat clear, dentition fair  Neck:               Supple, symmetrical  Lungs:             Clear to auscultation bilaterally. No Wheezing or Rhonchi. No rales. Chest wall:      No tenderness  No Accessory muscle use. Heart:              Regular  rhythm,  No  murmur   No edema  Abdomen:        Soft, non-tender. Not distended. Bowel sounds normal  Extremities:     erthema and swelling to right distal leg, doen'st appear continuous with knee surgical site  Skin:                Not pale. Not Jaundiced  No rashes   Psych:             Not anxious or agitated. Neurologic:      Alert, moves all extremities, answers questions appropriately and responds to commands        Data Review:    Review and/or order of clinical lab test  Review and/or order of tests in the radiology section of CPT  Review and/or order of tests in the medicine section of CPT      Labs:     Recent Labs     08/26/22 0537 08/25/22  1525   WBC 6.1 6.6   HGB 10.7* 11.7*   HCT 32.1* 35.8*    201     Recent Labs     08/26/22  0537 08/25/22  1525   * 135*   K 4.5 4.7    99*   CO2 26 29   BUN 14 14   CREA 1.05 1.12   * 117*   CA 8.6 8.9     No results for input(s): ALT, AP, TBIL, TBILI, TP, ALB, GLOB, GGT, AML, LPSE in the last 72 hours. No lab exists for component: SGOT, GPT, AMYP, HLPSE  No results for input(s): INR, PTP, APTT, INREXT in the last 72 hours. No results for input(s): FE, TIBC, PSAT, FERR in the last 72 hours. No results found for: FOL, RBCF   No results for input(s): PH, PCO2, PO2 in the last 72 hours. No results for input(s): CPK, CKNDX, TROIQ in the last 72 hours.     No lab exists for component: CPKMB  No results found for: CHOL, [address], [facility], [address], HDL, HDLP, LDL, LDLC, DLDLP, TGLX, TRIGL, TRIGP, CHHD, CHHDX  No results found for: [facility]  Lab Results   Component Value Date/Time    Color Yellow 03/16/2020 02:38 PM    Appearance Clear 03/16/2020 02:38 PM    pH (UA) 5.5 03/16/2020 02:38 PM    Ketone Negative 03/16/2020 02:38 PM    Bilirubin Negative 03/16/2020 02:38 PM    Nitrites Negative 03/16/2020 02:38 PM    Leukocyte Esterase Negative 03/16/2020 02:38 PM    Bacteria Few 03/16/2020 02:38 PM    WBC 0-5 03/16/2020 02:38 PM    RBC None seen 03/16/2020 02:38 PM         Medications Reviewed:     Current Facility-Administered Medications   Medication Dose Route Frequency    dilTIAZem ER (CARDIZEM CD) capsule 240 mg  240 mg Oral DAILY    acetaminophen (TYLENOL) tablet 650 mg  650 mg Oral Q6H PRN    ondansetron (ZOFRAN ODT) tablet 4 mg  4 mg Oral Q8H PRN    bisacodyL (DULCOLAX) tablet 5 mg  5 mg Oral DAILY PRN    piperacillin-tazobactam (ZOSYN) 3.375 g in 0.9% sodium chloride (MBP/ADV) 100 mL MBP  3.375 g IntraVENous Q8H    apixaban (ELIQUIS) tablet 5 mg  5 mg Oral BID    carvediloL (COREG) tablet 6.25 mg  6.25 mg Oral BID WITH MEALS    cholecalciferol (VITAMIN D3) (1000 Units /25 mcg) tablet 1,000 Units  1,000 Units Oral DAILY    docusate sodium (COLACE) capsule 100 mg  100 mg Oral BID    finasteride (PROSCAR) tablet 5 mg  5 mg Oral DAILY    ferrous sulfate tablet 325 mg  1 Tablet Oral DAILY WITH BREAKFAST    HYDROcodone-acetaminophen (NORCO) 5-325 mg per tablet 1 Tablet  1 Tablet Oral Q6H PRN    pantoprazole (PROTONIX) tablet 40 mg  40 mg Oral ACB    vancomycin (VANCOCIN) 1,000 mg in 0.9% sodium chloride 250 mL ([id number])  1,000 mg IntraVENous Q12H    [START ON 8/27/2022] VANCOMYCIN INFORMATION NOTE   Other ONCE    VANCOMYCIN INFORMATION NOTE 1 Each  1 Each Other Rx Dosing/Monitoring     ______________________________________________________________________  EXPECTED LENGTH OF STAY: - - -  ACTUAL LENGTH OF STAY:          1                 Carissa Neal MD

## 2022-08-26 NOTE — PROGRESS NOTES
Subjective:     Post-Operative Day: 9 Status Post right Total Knee Arthroplasty  Systemic or Specific Complaints:  Redness and swelling in operative lower leg    Objective:     Patient Vitals for the past 24 hrs:   BP Temp Pulse Resp SpO2 Height Weight   08/26/22 1058 125/74 97.1 °F (36.2 °C) 81 17 97 % -- --   08/26/22 0834 -- 97.3 °F (36.3 °C) -- -- -- -- --   08/26/22 0750 -- -- 84 -- -- -- --   08/26/22 0729 123/67 97 °F (36.1 °C) 84 16 97 % -- --   08/26/22 0414 113/61 (!) 96.5 °F (35.8 °C) 84 18 97 % -- 301 lb 8 oz (136.8 kg)   08/26/22 0000 (!) 122/58 97.5 °F (36.4 °C) 88 17 96 % -- --   08/25/22 2000 -- -- (!) 107 -- -- -- --   08/25/22 1950 130/86 97.8 °F (36.6 °C) 94 16 97 % -- --   08/25/22 1828 (!) 146/104 97 °F (36.1 °C) (!) 109 18 98 % -- --   08/25/22 1727 (!) 149/80 -- (!) 101 -- -- -- --   08/25/22 1725 (!) 149/80 -- (!) 108 18 97 % -- --   08/25/22 1503 (!) 142/92 97.1 °F (36.2 °C) 97 20 99 % 6' 4\" (1.93 m) 309 lb (140.2 kg)       General: alert, cooperative, no distress, appears stated age   Wound: Right knee incision dry and intact without erythema; significant erythema and swelling in the shin region   Motion: Flexion: -7 to 74 Degrees per Trac Patch carlos eduardo   DVT Exam: PVL's negative yesterday     Data Review:    Recent Results (from the past 24 hour(s))   CBC WITH AUTOMATED DIFF    Collection Time: 08/25/22  3:25 PM   Result Value Ref Range    WBC 6.6 4.6 - 13.2 K/uL    RBC 3.67 (L) 4.35 - 5.65 M/uL    HGB 11.7 (L) 13.0 - 16.0 g/dL    HCT 35.8 (L) 36.0 - 48.0 %    MCV 97.5 78.0 - 100.0 FL    MCH 31.9 24.0 - 34.0 PG    MCHC 32.7 31.0 - 37.0 g/dL    RDW 14.1 11.6 - 14.5 %    PLATELET 738 775 - 202 K/uL    MPV 10.0 9.2 - 11.8 FL    NRBC 0.0 0.0  WBC    ABSOLUTE NRBC 0.00 0.00 - 0.01 K/uL    NEUTROPHILS 64 40 - 73 %    LYMPHOCYTES 20 (L) 21 - 52 %    MONOCYTES 12 (H) 3 - 10 %    EOSINOPHILS 2 0 - 5 %    BASOPHILS 1 0 - 2 %    IMMATURE GRANULOCYTES 1 (H) 0 - 0.5 %    ABS.  NEUTROPHILS 4.3 1.8 - 8.0 K/UL    ABS. LYMPHOCYTES 1.3 0.9 - 3.6 K/UL    ABS. MONOCYTES 0.8 0.05 - 1.2 K/UL    ABS. EOSINOPHILS 0.2 0.0 - 0.4 K/UL    ABS. BASOPHILS 0.0 0.0 - 0.1 K/UL    ABS. IMM. GRANS. 0.1 (H) 0.00 - 0.04 K/UL    DF AUTOMATED     METABOLIC PANEL, BASIC    Collection Time: 08/25/22  3:25 PM   Result Value Ref Range    Sodium 135 (L) 136 - 145 mmol/L    Potassium 4.7 3.5 - 5.5 mmol/L    Chloride 99 (L) 100 - 111 mmol/L    CO2 29 21 - 32 mmol/L    Anion gap 7 3.0 - 18.0 mmol/L    Glucose 117 (H) 74 - 99 mg/dL    BUN 14 7 - 18 mg/dL    Creatinine 1.12 0.60 - 1.30 mg/dL    BUN/Creatinine ratio 13 12 - 20      GFR est AA >60 >60 ml/min/1.73m2    GFR est non-AA >60 >60 ml/min/1.73m2    Calcium 8.9 8.5 - 10.1 mg/dL   LACTIC ACID    Collection Time: 08/25/22  3:25 PM   Result Value Ref Range    Lactic acid 1.1 0.4 - 2.0 mmol/L   METABOLIC PANEL, BASIC    Collection Time: 08/26/22  5:37 AM   Result Value Ref Range    Sodium 135 (L) 136 - 145 mmol/L    Potassium 4.5 3.5 - 5.5 mmol/L    Chloride 100 100 - 111 mmol/L    CO2 26 21 - 32 mmol/L    Anion gap 9 3.0 - 18.0 mmol/L    Glucose 104 (H) 74 - 99 mg/dL    BUN 14 7 - 18 mg/dL    Creatinine 1.05 0.60 - 1.30 mg/dL    BUN/Creatinine ratio 13 12 - 20      GFR est AA >60 >60 ml/min/1.73m2    GFR est non-AA >60 >60 ml/min/1.73m2    Calcium 8.6 8.5 - 10.1 mg/dL   CBC WITH AUTOMATED DIFF    Collection Time: 08/26/22  5:37 AM   Result Value Ref Range    WBC 6.1 4.6 - 13.2 K/uL    RBC 3.38 (L) 4.35 - 5.65 M/uL    HGB 10.7 (L) 13.0 - 16.0 g/dL    HCT 32.1 (L) 36.0 - 48.0 %    MCV 95.0 78.0 - 100.0 FL    MCH 31.7 24.0 - 34.0 PG    MCHC 33.3 31.0 - 37.0 g/dL    RDW 14.0 11.6 - 14.5 %    PLATELET 751 225 - 398 K/uL    MPV 9.1 (L) 9.2 - 11.8 FL    NRBC 0.0 0.0  WBC    ABSOLUTE NRBC 0.00 0.00 - 0.01 K/uL    NEUTROPHILS 63 40 - 73 %    LYMPHOCYTES 21 21 - 52 %    MONOCYTES 11 (H) 3 - 10 %    EOSINOPHILS 3 0 - 5 %    BASOPHILS 1 0 - 2 %    IMMATURE GRANULOCYTES 1 (H) 0 - 0.5 %    ABS. NEUTROPHILS 3.9 1.8 - 8.0 K/UL    ABS. LYMPHOCYTES 1.3 0.9 - 3.6 K/UL    ABS. MONOCYTES 0.7 0.05 - 1.2 K/UL    ABS. EOSINOPHILS 0.2 0.0 - 0.4 K/UL    ABS. BASOPHILS 0.0 0.0 - 0.1 K/UL    ABS. IMM. GRANS. 0.1 (H) 0.00 - 0.04 K/UL    DF AUTOMATED           Assessment:     Status Post right Total Knee Arthroplasty.  RLE cellulitis below incision     Plan:     Continue with IV antibiotics per IM  Continue with PT; ambulation with assist and bed exercises for motion  Will follow up in clinic in 3 weeks

## 2022-08-26 NOTE — PROGRESS NOTES
0700- bedside shift report received and care of the patient assumed    0852- am medications amdinistered, ice water given, coffee given    1118- scheduled medication administered    1332- rounding on patient, on phone with friend    [phone]- taking a bath at the side of the bed.    1640- up in bed eating dinner

## 2022-08-26 NOTE — PROGRESS NOTES
1930  Bedside shift change report given to IMAN Ho  (oncoming nurse) by Talib Farah RN (offgoing nurse). Report included the following information SBAR, Kardex, Intake/Output, MAR, Recent Results, and Med Rec Status. Asumed care of pt resting in bed aaox3. Pt's right knee and lower leg red and swollen. Pt voices no c/o pain or discomfort. CBWR.     2200  Pt voided 300ml of isaac color urine in urinal.      0000  Hourly rounding complete at this time. Pt. Resting quietly with call bell in reach. VSS.     0400  Pt resting quietly with eyes closed. Resp easy and nonlabored. Pt aroused for vital signs. Pt voices no complaints. No distress noted. CBWR. Blood pressure 113/61, pulse 84, temperature (!) 96.5 °F (35.8 °C), resp. rate 18, height 6' 4\" (1.93 m), weight 136.8 kg (301 lb 8 oz), SpO2 97 %. 9786 Bedside shift report given to oncoming nurse.

## 2022-08-26 NOTE — PROGRESS NOTES
Problem: Patient Education: Go to Patient Education Activity  Goal: Patient/Family Education  Outcome: Progressing Towards Goal     Problem: Pain  Goal: *Control of Pain  Outcome: Progressing Towards Goal  Goal: *PALLIATIVE CARE:  Alleviation of Pain  Outcome: Progressing Towards Goal     Problem: Patient Education: Go to Patient Education Activity  Goal: Patient/Family Education  Outcome: Progressing Towards Goal     Problem: Falls - Risk of  Goal: *Absence of Falls  Description: Document Toyin Fall Risk and appropriate interventions in the flowsheet.   Outcome: Progressing Towards Goal  Note: Fall Risk Interventions:            Medication Interventions: Teach patient to arise slowly, Patient to call before getting OOB                   Problem: Patient Education: Go to Patient Education Activity  Goal: Patient/Family Education  Outcome: Progressing Towards Goal     Problem: Cellulitis Care Plan (Adult)  Goal: *Control of acute pain  Outcome: Progressing Towards Goal  Goal: *Skin integrity maintained  Outcome: Progressing Towards Goal  Goal: *Absence of infection signs and symptoms  Outcome: Progressing Towards Goal     Problem: Patient Education: Go to Patient Education Activity  Goal: Patient/Family Education  Outcome: Progressing Towards Goal

## 2022-08-26 NOTE — PROGRESS NOTES
Problem: Mobility Impaired (Adult and Pediatric)  Goal: *Acute Goals and Plan of Care (Insert Text)  Description: Physical Therapy Goals  Initiated 8/26/2022 and to be accomplished within 7 day(s)  1. Patient will move from supine to sit and sit to supine , scoot up and down, and roll side to side in bed with modified independence. 2.  Patient will transfer from bed to chair and chair to bed with modified independence using the least restrictive device. 3.  Patient will perform sit to stand with modified independence. 4.  Patient will ambulate with modified independence for 400 feet with the least restrictive device. 5.  Patient will ascend/descend 4 stairs with 2 handrail(s) with modified independence. 6.  Patient will improve R knee ROM to 5-85    PLOF: Community ambulator, cane, (I) ADLs.     8/26/2022 1329 by Veronique Richmond  Outcome: Progressing Towards Goal  PHYSICAL THERAPY EVALUATION     Patient: Romelia Bautista (26 y.o. male)  Date: 8/26/2022   Start Time: 0955   Stop Time: [phone]  $$ Initial PT Evaluation: Low Complex 20 Min  $$ Therapeutic Activity: 8-22 mins  Primary Diagnosis: Cellulitis of right leg [L03.115]  Chronic a-fib (HCC) [I48.20]       Precautions:  WBAT, Skin (Cellulitis of R LE)    ASSESSMENT :  Based on the objective data described below, the patient presents s/p R TKA on 8/17 and he has developed cellulitis. He is able to ambulate with a cane with MOD (I). He is educated on home exercises to continue and tolerates well. He would benefit from further P.T. to improve strength, gait, and stair navigation. He can return home with outpatient P.T. PLAN :  Recommendations and Planned Interventions:   Patient to be seen 1-2x/day, 3-5 days/wk. Discharge Recommendations: Outpatient  AM-PAC:22/24  Further Equipment Recommendations for Discharge: N/A     SUBJECTIVE:   Patient states my leg is really swollen.     OBJECTIVE DATA SUMMARY:     Past Medical History:   Diagnosis Date Chronic atrial fibrillation (HCC)     Chronic diarrhea     HLD (hyperlipidemia)     HTN (hypertension)     Hyperparathyroidism (HCC)     Impaired glucose tolerance     Obesity     Pleural effusion      Past Surgical History:   Procedure Laterality Date    HX PARATHYROIDECTOMY       Barriers to Learning/Limitations: None  Compensate with: N/A  Home Situation:   Home Situation  Home Environment: Private residence  # Steps to Enter: 5  Rails to Enter: Yes  Hand Rails : Bilateral  One/Two Story Residence: Two story  # of Interior Steps: 15  Interior Rails: Both  Living Alone: Yes  Support Systems: Other Family Member(s)  Patient Expects to be Discharged to[de-identified] Home with outpatient services  Current DME Used/Available at Home: Cane, straight, Walker, rolling  Critical Behavior:  Neurologic State: Alert  Orientation Level: Oriented X4  Skin Integumentary  Skin Color: Romero    Strength:    Strength: Generally decreased, functional (R knee 4/5)  Tone & Sensation:   Tone: Normal  Sensation: Intact    Range Of Motion:   AROM: Generally decreased, functional (R knee 11-71)  PROM: Generally decreased, functional (R knee 8-75)    Posture:  Posture (WDL): Within defined limits    Functional Mobility:  Bed Mobility:  Rolling: Modified independent  Supine to Sit: Modified independent  Sit to Supine: Modified independent  Scooting: Modified independent  Transfers:  Sit to Stand: Modified independent  Stand to Sit: Modified independent  Balance:   Sitting: Intact  Standing: Intact  Ambulation/Gait Training:  Distance (ft): 100 Feet (ft)  Assistive Device: Cane, straight  Ambulation - Level of Assistance: Modified independent     Gait Description (WDL): Exceptions to WDL  Gait Abnormalities: Antalgic  Right Side Weight Bearing: As tolerated   AM-PAC:  22/24; Current research shows that an AM-PAC score of 17 or less is typically not associated with a discharge to the patient's home setting, whereas a score of 18 or greater is typically associated with a discharge to the patient's home setting. Today's TX:   Pt is able to ambulate with MOD (I)  with a cane. He is educated on exercises to perform and states understanding. Pain:  Pain level pre-treatment: 4-5/10   Pain level post-treatment: 5-6/10  Pain Location: R knee  Pain Intervention(s): Medication (see MAR); Rest, Ice, Repositioning   Response to intervention: Nurse notified, See doc flow    Activity Tolerance:   Good  Please refer to the flowsheet for vital signs taken during this treatment. After treatment:   []         Patient left in no apparent distress sitting up in chair  [x]         Patient left in no apparent distress in bed  [x]         Call bell left within reach  [x]         Nursing notified  []         Caregiver present  []         Bed alarm activated  []         SCDs applied    COMMUNICATION/EDUCATION:   [x]         Role of Physical Therapy in the acute care setting. [x]         Fall prevention education was provided and the patient/caregiver indicated understanding. [x]         Patient/family have participated as able in goal setting and plan of care. [x]         Patient/family agree to work toward stated goals and plan of care. []         Patient understands intent and goals of therapy, but is neutral about his/her participation. []         Patient is unable to participate in goal setting/plan of care: ongoing with therapy staff.  []         Other:     Thank you for this referral.  Brijesh Higgins, PT, DPT   Time Calculation: 28 mins

## 2022-08-26 NOTE — PROGRESS NOTES
Care Management Interventions  PCP Verified by CM: Yes  Palliative Care Criteria Met (RRAT>21 & CHF Dx)?: No  Mode of Transport at Discharge: Other (see comment) (Family)  Transition of Care Consult (CM Consult): Discharge Planning  MyChart Signup: No  Discharge Durable Medical Equipment: No  Health Maintenance Reviewed: Yes  Physical Therapy Consult: Yes  Occupational Therapy Consult: No  Speech Therapy Consult: No  Support Systems: Other Family Member(s)  Confirm Follow Up Transport: Family  The Patient and/or Patient Representative was Provided with a Choice of Provider and Agrees with the Discharge Plan?: Yes  Freedom of Choice List was Provided with Basic Dialogue that Supports the Patient's Individualized Plan of Care/Goals, Treatment Preferences and Shares the Quality Data Associated with the Providers?: Yes  Discharge Location  Patient Expects to be Discharged to[de-identified] Home with outpatient services  Reason for Admission: Chart reviewed and noted patient was referred to the ER by Outpatient Physical Therapy, for concerns about possible cellulitis in his right leg. He had an elective right TKA on 8/17/2022, and also had post-op bleeding. Pt states he has been having increased redness, swelling, warmth, and pain in his right leg in the last 3 days. He was seen by Ortho who placed him on Keflex tablets. About a week ago he did have some intermittent fevers with temp up to 101.4 with chills, which he managed with Tylenol.      Dx: Right Lower Extremity Cellulitis     PMH: Atrial Fibrillation, HTN, HLP, BPH, Anemia, Recent Right TKA                      RUR Score: 14%                   Plan for utilizing home health: TBD        PCP: First and Last name:  Paul Vergara MD     Name of Practice:    Are you a current patient: Yes/No:    Approximate date of last visit:    Can you participate in a virtual visit with your PCP:                     Current Advanced Directive/Advance Care Plan: Full Code- No ACP      Healthcare Decision Maker: Primary Decision Maker- Sister- Torrey Carrillo- [phone], Secondary Decision Maker- Relative- Maira Turner- [phone]   Click here to complete [address] including selection of the Healthcare Decision Maker Relationship (ie \"Primary\")             Primary Decision Maker: Torrey Carrillo - Other Relative - [phone]    Secondary Decision Maker: Holger Yang - Other Relative - [phone]                  Transition of Care Plan: Patient lives at home alone. He has a cane and a wheeled walker. He has an appointment with Manuela Morrow NP on 9/15/2022 at 0945. He also has an appointment with PT. Teach back and medication reconciliation will be done. Nurse will make follow-up appointments. Patient plans to return back home with outpatient services at discharge.

## 2022-08-27 ENCOUNTER — APPOINTMENT (OUTPATIENT)
Dept: CT IMAGING | Age: 72
DRG: 603 | End: 2022-08-27
Attending: STUDENT IN AN ORGANIZED HEALTH CARE EDUCATION/TRAINING PROGRAM
Payer: MEDICARE

## 2022-08-27 LAB
ALBUMIN SERPL-MCNC: 2.9 G/DL (ref 3.4–5)
ALBUMIN/GLOB SERPL: 0.9 {RATIO} (ref 0.8–1.7)
ALP SERPL-CCNC: 119 U/L (ref 45–117)
ALT SERPL-CCNC: 47 U/L (ref 16–61)
ANION GAP SERPL CALC-SCNC: 8 MMOL/L (ref 3–18)
AST SERPL W P-5'-P-CCNC: 32 U/L (ref 10–38)
BASOPHILS # BLD: 0.1 K/UL (ref 0–0.1)
BASOPHILS NFR BLD: 1 % (ref 0–2)
BILIRUB SERPL-MCNC: 1.3 MG/DL (ref 0.2–1)
BUN SERPL-MCNC: 15 MG/DL (ref 7–18)
BUN/CREAT SERPL: 13 (ref 12–20)
CA-I BLD-MCNC: 8.8 MG/DL (ref 8.5–10.1)
CHLORIDE SERPL-SCNC: 97 MMOL/L (ref 100–111)
CK SERPL-CCNC: 18 U/L (ref 39–308)
CO2 SERPL-SCNC: 28 MMOL/L (ref 21–32)
CREAT SERPL-MCNC: 1.13 MG/DL (ref 0.6–1.3)
DATE LAST DOSE: NORMAL
DIFFERENTIAL METHOD BLD: ABNORMAL
EOSINOPHIL # BLD: 0.2 K/UL (ref 0–0.4)
EOSINOPHIL NFR BLD: 3 % (ref 0–5)
ERYTHROCYTE [DISTWIDTH] IN BLOOD BY AUTOMATED COUNT: 13.9 % (ref 11.6–14.5)
GLOBULIN SER CALC-MCNC: 3.3 G/DL (ref 2–4)
GLUCOSE SERPL-MCNC: 116 MG/DL (ref 74–99)
HCT VFR BLD AUTO: 34.3 % (ref 36–48)
HGB BLD-MCNC: 11.3 G/DL (ref 13–16)
IMM GRANULOCYTES # BLD AUTO: 0.1 K/UL (ref 0–0.04)
IMM GRANULOCYTES NFR BLD AUTO: 1 % (ref 0–0.5)
LYMPHOCYTES # BLD: 1.1 K/UL (ref 0.9–3.6)
LYMPHOCYTES NFR BLD: 17 % (ref 21–52)
MAGNESIUM SERPL-MCNC: 2.1 MG/DL (ref 1.6–2.6)
MCH RBC QN AUTO: 31.6 PG (ref 24–34)
MCHC RBC AUTO-ENTMCNC: 32.9 G/DL (ref 31–37)
MCV RBC AUTO: 95.8 FL (ref 78–100)
MONOCYTES # BLD: 0.7 K/UL (ref 0.05–1.2)
MONOCYTES NFR BLD: 11 % (ref 3–10)
NEUTS SEG # BLD: 4.2 K/UL (ref 1.8–8)
NEUTS SEG NFR BLD: 67 % (ref 40–73)
NRBC # BLD: 0 K/UL (ref 0–0.01)
NRBC BLD-RTO: 0 PER 100 WBC
PHOSPHATE SERPL-MCNC: 3 MG/DL (ref 2.5–4.9)
PLATELET # BLD AUTO: 213 K/UL (ref 135–420)
PMV BLD AUTO: 9.3 FL (ref 9.2–11.8)
POTASSIUM SERPL-SCNC: 4.3 MMOL/L (ref 3.5–5.5)
PROT SERPL-MCNC: 6.2 G/DL (ref 6.4–8.2)
RBC # BLD AUTO: 3.58 M/UL (ref 4.35–5.65)
REPORTED DOSE,DOSE: NORMAL UNITS
SODIUM SERPL-SCNC: 133 MMOL/L (ref 136–145)
VANCOMYCIN TROUGH SERPL-MCNC: 12.5 UG/ML (ref 10–20)
WBC # BLD AUTO: 6.3 K/UL (ref 4.6–13.2)

## 2022-08-27 PROCEDURE — 74011250637 HC RX REV CODE- 250/637: Performed by: NURSE PRACTITIONER

## 2022-08-27 PROCEDURE — 82550 ASSAY OF CK (CPK): CPT

## 2022-08-27 PROCEDURE — 74011250636 HC RX REV CODE- 250/636: Performed by: INTERNAL MEDICINE

## 2022-08-27 PROCEDURE — 74011250637 HC RX REV CODE- 250/637: Performed by: EMERGENCY MEDICINE

## 2022-08-27 PROCEDURE — 80202 ASSAY OF VANCOMYCIN: CPT

## 2022-08-27 PROCEDURE — 73701 CT LOWER EXTREMITY W/DYE: CPT

## 2022-08-27 PROCEDURE — 65270000029 HC RM PRIVATE

## 2022-08-27 PROCEDURE — 74011000636 HC RX REV CODE- 636: Performed by: INTERNAL MEDICINE

## 2022-08-27 PROCEDURE — 84100 ASSAY OF PHOSPHORUS: CPT

## 2022-08-27 PROCEDURE — 83735 ASSAY OF MAGNESIUM: CPT

## 2022-08-27 PROCEDURE — 74011000258 HC RX REV CODE- 258: Performed by: INTERNAL MEDICINE

## 2022-08-27 PROCEDURE — 80053 COMPREHEN METABOLIC PANEL: CPT

## 2022-08-27 PROCEDURE — 85025 COMPLETE CBC W/AUTO DIFF WBC: CPT

## 2022-08-27 PROCEDURE — 74011000258 HC RX REV CODE- 258: Performed by: NURSE PRACTITIONER

## 2022-08-27 PROCEDURE — 74011250636 HC RX REV CODE- 250/636: Performed by: NURSE PRACTITIONER

## 2022-08-27 RX ORDER — HYDROCODONE BITARTRATE AND ACETAMINOPHEN 5; 325 MG/1; MG/1
1 TABLET ORAL
Status: DISCONTINUED | OUTPATIENT
Start: 2022-08-27 | End: 2022-08-28 | Stop reason: HOSPADM

## 2022-08-27 RX ADMIN — FERROUS SULFATE TAB 325 MG (65 MG ELEMENTAL FE) 325 MG: 325 (65 FE) TAB at 08:33

## 2022-08-27 RX ADMIN — VANCOMYCIN HYDROCHLORIDE 1000 MG: 1 INJECTION, POWDER, LYOPHILIZED, FOR SOLUTION INTRAVENOUS at 08:34

## 2022-08-27 RX ADMIN — Medication 1000 UNITS: at 08:34

## 2022-08-27 RX ADMIN — PANTOPRAZOLE SODIUM 40 MG: 40 TABLET, DELAYED RELEASE ORAL at 06:32

## 2022-08-27 RX ADMIN — APIXABAN 5 MG: 5 TABLET, FILM COATED ORAL at 17:03

## 2022-08-27 RX ADMIN — DOCUSATE SODIUM 100 MG: 100 CAPSULE, LIQUID FILLED ORAL at 17:03

## 2022-08-27 RX ADMIN — CARVEDILOL 6.25 MG: 6.25 TABLET, FILM COATED ORAL at 17:03

## 2022-08-27 RX ADMIN — VANCOMYCIN HYDROCHLORIDE 1000 MG: 1 INJECTION, POWDER, LYOPHILIZED, FOR SOLUTION INTRAVENOUS at 20:45

## 2022-08-27 RX ADMIN — IOPAMIDOL 100 ML: 755 INJECTION, SOLUTION INTRAVENOUS at 09:58

## 2022-08-27 RX ADMIN — DILTIAZEM HYDROCHLORIDE 240 MG: 240 CAPSULE, COATED, EXTENDED RELEASE ORAL at 08:34

## 2022-08-27 RX ADMIN — FINASTERIDE 5 MG: 5 TABLET, FILM COATED ORAL at 08:34

## 2022-08-27 RX ADMIN — PIPERACILLIN AND TAZOBACTAM 3.38 G: 3; .375 INJECTION, POWDER, FOR SOLUTION INTRAVENOUS at 17:03

## 2022-08-27 RX ADMIN — HYDROCODONE BITARTRATE AND ACETAMINOPHEN 1 TABLET: 5; 325 TABLET ORAL at 13:42

## 2022-08-27 RX ADMIN — HYDROCODONE BITARTRATE AND ACETAMINOPHEN 1 TABLET: 5; 325 TABLET ORAL at 17:49

## 2022-08-27 RX ADMIN — APIXABAN 5 MG: 5 TABLET, FILM COATED ORAL at 08:33

## 2022-08-27 RX ADMIN — CARVEDILOL 6.25 MG: 6.25 TABLET, FILM COATED ORAL at 08:34

## 2022-08-27 RX ADMIN — PIPERACILLIN AND TAZOBACTAM 3.38 G: 3; .375 INJECTION, POWDER, FOR SOLUTION INTRAVENOUS at 08:34

## 2022-08-27 RX ADMIN — PIPERACILLIN AND TAZOBACTAM 3.38 G: 3; .375 INJECTION, POWDER, FOR SOLUTION INTRAVENOUS at 00:08

## 2022-08-27 RX ADMIN — DOCUSATE SODIUM 100 MG: 100 CAPSULE, LIQUID FILLED ORAL at 08:34

## 2022-08-27 NOTE — PROGRESS NOTES
1930  Bedside shift change report given to IMAN Mota RN (oncoming nurse) by Alanis Justice RN (offgoing nurse). Report included the following information SBAR, Kardex, Intake/Output, MAR, Recent Results, Med Rec Status, and Cardiac Rhythm A-Fib . Pt resting in bed aaox3. Pt right knee incision intact and lower leg red and swollen(cellulitis). Pt voices no c/o pain. CBWR.    2100  Pt awake taking on cell phone. 2348  Norco 1tab po given for c/o right leg pain. 0200  Pt resting quietly with eyes closed. Resp easy and nonlabored. No distress noted. CBWR.     0400  Hourly rounding complete at this time. Pt. Resting quietly with call bell in reach. VSS. Blood pressure 118/69, pulse 74, temperature 97.6 °F (36.4 °C), resp. rate 16, height 6' 4\" (1.93 m), weight 136 kg (299 lb 14.4 oz), SpO2 97 %.    0700  Bedside shift report given to oncoming nurse.

## 2022-08-27 NOTE — PROGRESS NOTES
Hospitalist Progress Note             Date of Service:  2022  NAME:  Zainab Herrera  :  1950  MRN:  [id number]    Assessment & Plan:      #1: Right lower extremity cellulitis:  -admit to inpatient, medical unit.  -Start IV antibiotics with Zosyn 3.375 mg IV every 8 hours, renally dosed. -Vancomycin, per pharmacy dosing protocol.  -2 sets blood cultures obtained in the ED.  -His lactic acid is 1.1, wbc is 6.  --venous dopplers negative for DVT. -monitor vitals, follow blood cultures, CBC, BMP daily  -Norco prn for pain. -PT eval and treat  No improvement in erythema with continued right leg pain and swelling despite being on Vanc and zosyn. Will order CT to rule out osteomyelitis vs abscess. Continue Vanc and Zosyn     #2: Atrial fibrillation:  -chronic issue, rates controlled. -cont diltiazem, and Eliquis. #3: Hypertension:  -chronic issue, fairly stable, cont carvedilol. #4: Anemia:  -chronic issue, cont ferrous sulfate. -h/h stable at 35. #5: BPH:  -cont finasteride. Hospital Problems  Date Reviewed: 2022            Codes Class Noted POA    Chronic a-fib [facility]) ICD-10-CM: I48.20  ICD-9-CM: 427.31  2022 Unknown        Cellulitis of right leg ICD-10-CM: L03.115  ICD-9-CM: 682.6  2022 Unknown           Review of Systems:   A comprehensive review of systems was negative except for that written in the HPI. Patient still has pain in his right leg with no improvement in his redness. He is concerned that he might have osteomyelitis. Complains of bone pain in his right leg. Vital Signs:    Last 24hrs VS reviewed since prior progress note.  Most recent are:  Visit Vitals  /69 (BP 1 Location: Left upper arm, BP Patient Position: At rest)   Pulse 74   Temp 97.6 °F (36.4 °C)   Resp 16   Ht 6' 4\" (1.93 m)   Wt 136 kg (299 lb 14.4 oz)   SpO2 97%   BMI 36.50 kg/m² Intake/Output Summary (Last 24 hours) at 8/27/2022 1008  Last data filed at 8/27/2022 0640  Gross per 24 hour   Intake 1050 ml   Output 3900 ml   Net -2850 ml          Physical Examination:             General:          Alert, cooperative, no distress, appears stated age. HEENT:           Atraumatic, anicteric sclerae, pink conjunctivae                          No oral ulcers, mucosa moist, throat clear, dentition fair  Neck:               Supple, symmetrical  Lungs:             Clear to auscultation bilaterally. No Wheezing or Rhonchi. No rales. Chest wall:      No tenderness  No Accessory muscle use. Heart:              Regular  rhythm,  No  murmur   No edema  Abdomen:        Soft, non-tender. Not distended. Bowel sounds normal  Extremities:     Erythema in right distal leg that has not improved, 2+ pitting edema  Skin:                Not pale. Not Jaundiced  No rashes   Psych:             Not anxious or agitated. Neurologic:      Alert, moves all extremities, answers questions appropriately and responds to commands        Data Review:    Review and/or order of clinical lab test  Review and/or order of tests in the radiology section of CPT  Review and/or order of tests in the medicine section of CPT      Labs:     Recent Labs     08/26/22  0537 08/25/22  1525   WBC 6.1 6.6   HGB 10.7* 11.7*   HCT 32.1* 35.8*    201       Recent Labs     08/26/22  0537 08/25/22  1525   * 135*   K 4.5 4.7    99*   CO2 26 29   BUN 14 14   CREA 1.05 1.12   * 117*   CA 8.6 8.9       No results for input(s): ALT, AP, TBIL, TBILI, TP, ALB, GLOB, GGT, AML, LPSE in the last 72 hours. No lab exists for component: SGOT, GPT, AMYP, HLPSE  No results for input(s): INR, PTP, APTT, INREXT, INREXT in the last 72 hours. No results for input(s): FE, TIBC, PSAT, FERR in the last 72 hours. No results found for: FOL, RBCF   No results for input(s): PH, PCO2, PO2 in the last 72 hours.   No results for input(s): CPK, CKNDX, TROIQ in the last 72 hours.     No lab exists for component: CPKMB  No results found for: CHOL, CHOLX, CHLST, CHOLV, HDL, HDLP, LDL, LDLC, DLDLP, TGLX, TRIGL, TRIGP, CHHD, CHHDX  No results found for: [facility]  Lab Results   Component Value Date/Time    Color Yellow 03/16/2020 02:38 PM    Appearance Clear 03/16/2020 02:38 PM    pH (UA) 5.5 03/16/2020 02:38 PM    Ketone Negative 03/16/2020 02:38 PM    Bilirubin Negative 03/16/2020 02:38 PM    Nitrites Negative 03/16/2020 02:38 PM    Leukocyte Esterase Negative 03/16/2020 02:38 PM    Bacteria Few 03/16/2020 02:38 PM    WBC 0-5 03/16/2020 02:38 PM    RBC None seen 03/16/2020 02:38 PM         Medications Reviewed:     Current Facility-Administered Medications   Medication Dose Route Frequency    iopamidoL (ISOVUE-370) 76 % injection 100 mL  100 mL IntraVENous RAD ONCE    dilTIAZem ER (CARDIZEM CD) capsule 240 mg  240 mg Oral DAILY    acetaminophen (TYLENOL) tablet 650 mg  650 mg Oral Q6H PRN    ondansetron (ZOFRAN ODT) tablet 4 mg  4 mg Oral Q8H PRN    bisacodyL (DULCOLAX) tablet 5 mg  5 mg Oral DAILY PRN    piperacillin-tazobactam (ZOSYN) 3.375 g in 0.9% sodium chloride (MBP/ADV) 100 mL MBP  3.375 g IntraVENous Q8H    apixaban (ELIQUIS) tablet 5 mg  5 mg Oral BID    carvediloL (COREG) tablet 6.25 mg  6.25 mg Oral BID WITH MEALS    cholecalciferol (VITAMIN D3) (1000 Units /25 mcg) tablet 1,000 Units  1,000 Units Oral DAILY    docusate sodium (COLACE) capsule 100 mg  100 mg Oral BID    finasteride (PROSCAR) tablet 5 mg  5 mg Oral DAILY    ferrous sulfate tablet 325 mg  1 Tablet Oral DAILY WITH BREAKFAST    HYDROcodone-acetaminophen (NORCO) 5-325 mg per tablet 1 Tablet  1 Tablet Oral Q6H PRN    pantoprazole (PROTONIX) tablet 40 mg  40 mg Oral ACB    vancomycin (VANCOCIN) 1,000 mg in 0.9% sodium chloride 250 mL ([id number])  1,000 mg IntraVENous Q12H    VANCOMYCIN INFORMATION NOTE 1 Each  1 Each Other Rx Dosing/Monitoring ______________________________________________________________________  EXPECTED LENGTH OF STAY: 3d 4h  ACTUAL LENGTH OF STAY:          2                 Mary Titus MD

## 2022-08-27 NOTE — PROGRESS NOTES
Patient complaining of pain to his right knee and stated that the Fredrica Phalen is helping with alleviating his pain, but the medication is not lasting, at this time he has Norco 1 tab every 6 hours, with change the frequency to every 4 hours.

## 2022-08-27 NOTE — PROGRESS NOTES
Pharmacy Dosing Services: Vancomycin    Indication: Skin Soft Tissue Infection    Day of therapy:     Other Antimicrobials (Include dose, start day & day of therapy):  Piperacillin/Tazobactam (Zosyn)  Current Antimicrobial Therapy (168h ago, onward)       Ordered     Start Stop    22  vancomycin (VANCOCIN) 1,000 mg in 0.9% sodium chloride 250 mL ([id number])  1,000 mg,   IntraVENous,   EVERY 12 HOURS        References:    Lexicomp    22 0800 22 0759    22 1733  VANCOMYCIN INFORMATION NOTE 1 Each  1 Each,   Other,   RX DOSING/MONITORING        References:    Lexicomp    22 1730 --    22 170  piperacillin-tazobactam (ZOSYN) 3.375 g in 0.9% sodium chloride (MBP/ADV) 100 mL MBP  3.375 g,   IntraVENous,   EVERY 8 HOURS        References:    Lexicomp    22 170 --                    Loading dose (date given): 2000 mg  Current Maintenance dose: 1000 mg IV every 12 hours    Goal Vancomycin Level: AUC24 (range): 400-600 mg/L.hr    Vancomycin Level (if drawn):   Recent Labs     22  0434   VANCT 12.5         Pt Weight Weight: 136 kg (299 lb 14.4 oz)   Temperature Temp: 97.6 °F (36.4 °C)   Temp (72hrs), Av.4 °F (36.3 °C), Min:96.5 °F (35.8 °C), Max:98.5 °F (36.9 °C)     HR Pulse (Heart Rate): 74   BP BP: 118/69     Recent Labs     22  0537 22  1525   CREA 1.05 1.12   BUN 14 14   WBC 6.1 6.6     Estimated Creatinine Clearance Estimated Creatinine Clearance: 97.2 mL/min (based on SCr of 1.05 mg/dL).   Estimated Creatinine Clearance (using IBW): 79.2 mL/min     CAPD, Hemodialysis or Renal Replacement Therapy: N/A  Renal function stable? (unstable defined as SCr increase of 0.5 mg/dL or > 50% increase from baseline, whichever is greater) (Y/N): y     Significant Cultures:   Results       Procedure Component Value Units Date/Time    CULTURE, BLOOD [[id number]] Collected: 22 1525    Order Status: Completed Specimen: Blood Updated: 22 0741     Special Requests: No Special Requests        Culture result: No growth 2 days       CULTURE, BLOOD [[id number]] Collected: 08/25/22 1515    Order Status: Completed Specimen: Blood Updated: 08/27/22 0741     Special Requests: No Special Requests        Culture result: No growth 2 days                 Regimen assessment: This mornings level indicates regimen is on track, continue  Maintenance dose: 1000 mg IV every 12 hours  Next scheduled level: regimen ends 8/31, no level needed unless significant change in creatinine clearance       Pharmacy will follow daily and adjust medications as appropriate for renal function and/or serum levels.     Thank you,  Carlos Martins  Pharmacist  [phone]

## 2022-08-27 NOTE — PROGRESS NOTES
Problem: Pain  Goal: *Control of Pain  Outcome: Progressing Towards Goal  Goal: *PALLIATIVE CARE:  Alleviation of Pain  Outcome: Progressing Towards Goal     Problem: Patient Education: Go to Patient Education Activity  Goal: Patient/Family Education  Outcome: Progressing Towards Goal     Problem: Falls - Risk of  Goal: *Absence of Falls  Description: Document Renato Leon Fall Risk and appropriate interventions in the flowsheet.   Outcome: Progressing Towards Goal  Note: Fall Risk Interventions:  Mobility Interventions: Bed/chair exit alarm         Medication Interventions: Teach patient to arise slowly                   Problem: Patient Education: Go to Patient Education Activity  Goal: Patient/Family Education  Outcome: Progressing Towards Goal     Problem: Cellulitis Care Plan (Adult)  Goal: *Control of acute pain  Outcome: Progressing Towards Goal  Goal: *Skin integrity maintained  Outcome: Progressing Towards Goal  Goal: *Absence of infection signs and symptoms  Outcome: Progressing Towards Goal     Problem: Patient Education: Go to Patient Education Activity  Goal: Patient/Family Education  Outcome: Progressing Towards Goal     Problem: Patient Education: Go to Patient Education Activity  Goal: Patient/Family Education  Outcome: Progressing Towards Goal

## 2022-08-27 NOTE — PROGRESS NOTES
Problem: Pain  Goal: *Control of Pain  Outcome: Progressing Towards Goal     Problem: Falls - Risk of  Goal: *Absence of Falls  Description: Document Toyin Fall Risk and appropriate interventions in the flowsheet.   Outcome: Progressing Towards Goal  Note: Fall Risk Interventions:  Mobility Interventions: Bed/chair exit alarm         Medication Interventions: Teach patient to arise slowly

## 2022-08-28 VITALS
BODY MASS INDEX: 36.52 KG/M2 | TEMPERATURE: 97 F | WEIGHT: 299.9 LBS | HEIGHT: 76 IN | DIASTOLIC BLOOD PRESSURE: 72 MMHG | SYSTOLIC BLOOD PRESSURE: 138 MMHG | HEART RATE: 72 BPM | OXYGEN SATURATION: 100 % | RESPIRATION RATE: 17 BRPM

## 2022-08-28 LAB
ALBUMIN SERPL-MCNC: 2.8 G/DL (ref 3.4–5)
ALBUMIN/GLOB SERPL: 0.8 {RATIO} (ref 0.8–1.7)
ALP SERPL-CCNC: 118 U/L (ref 45–117)
ALT SERPL-CCNC: 55 U/L (ref 16–61)
ANION GAP SERPL CALC-SCNC: 8 MMOL/L (ref 3–18)
AST SERPL W P-5'-P-CCNC: 44 U/L (ref 10–38)
BASOPHILS # BLD: 0 K/UL (ref 0–0.1)
BASOPHILS NFR BLD: 1 % (ref 0–2)
BILIRUB SERPL-MCNC: 1.2 MG/DL (ref 0.2–1)
BUN SERPL-MCNC: 16 MG/DL (ref 7–18)
BUN/CREAT SERPL: 15 (ref 12–20)
CA-I BLD-MCNC: 8.7 MG/DL (ref 8.5–10.1)
CHLORIDE SERPL-SCNC: 103 MMOL/L (ref 100–111)
CO2 SERPL-SCNC: 23 MMOL/L (ref 21–32)
CREAT SERPL-MCNC: 1.1 MG/DL (ref 0.6–1.3)
DIFFERENTIAL METHOD BLD: ABNORMAL
EOSINOPHIL # BLD: 0.2 K/UL (ref 0–0.4)
EOSINOPHIL NFR BLD: 3 % (ref 0–5)
ERYTHROCYTE [DISTWIDTH] IN BLOOD BY AUTOMATED COUNT: 14 % (ref 11.6–14.5)
GLOBULIN SER CALC-MCNC: 3.3 G/DL (ref 2–4)
GLUCOSE SERPL-MCNC: 102 MG/DL (ref 74–99)
HCT VFR BLD AUTO: 34.8 % (ref 36–48)
HGB BLD-MCNC: 11.5 G/DL (ref 13–16)
IMM GRANULOCYTES # BLD AUTO: 0 K/UL (ref 0–0.04)
IMM GRANULOCYTES NFR BLD AUTO: 1 % (ref 0–0.5)
LYMPHOCYTES # BLD: 1.4 K/UL (ref 0.9–3.6)
LYMPHOCYTES NFR BLD: 20 % (ref 21–52)
MAGNESIUM SERPL-MCNC: 1.9 MG/DL (ref 1.6–2.6)
MCH RBC QN AUTO: 31.4 PG (ref 24–34)
MCHC RBC AUTO-ENTMCNC: 33 G/DL (ref 31–37)
MCV RBC AUTO: 95.1 FL (ref 78–100)
MONOCYTES # BLD: 0.6 K/UL (ref 0.05–1.2)
MONOCYTES NFR BLD: 9 % (ref 3–10)
NEUTS SEG # BLD: 4.6 K/UL (ref 1.8–8)
NEUTS SEG NFR BLD: 66 % (ref 40–73)
NRBC # BLD: 0 K/UL (ref 0–0.01)
NRBC BLD-RTO: 0 PER 100 WBC
PHOSPHATE SERPL-MCNC: 3.3 MG/DL (ref 2.5–4.9)
PLATELET # BLD AUTO: 211 K/UL (ref 135–420)
PMV BLD AUTO: 10 FL (ref 9.2–11.8)
POTASSIUM SERPL-SCNC: 4.2 MMOL/L (ref 3.5–5.5)
PROT SERPL-MCNC: 6.1 G/DL (ref 6.4–8.2)
RBC # BLD AUTO: 3.66 M/UL (ref 4.35–5.65)
SODIUM SERPL-SCNC: 134 MMOL/L (ref 136–145)
WBC # BLD AUTO: 6.9 K/UL (ref 4.6–13.2)

## 2022-08-28 PROCEDURE — 74011250637 HC RX REV CODE- 250/637: Performed by: EMERGENCY MEDICINE

## 2022-08-28 PROCEDURE — 36415 COLL VENOUS BLD VENIPUNCTURE: CPT

## 2022-08-28 PROCEDURE — 74011000258 HC RX REV CODE- 258: Performed by: INTERNAL MEDICINE

## 2022-08-28 PROCEDURE — 80053 COMPREHEN METABOLIC PANEL: CPT

## 2022-08-28 PROCEDURE — 74011250637 HC RX REV CODE- 250/637: Performed by: NURSE PRACTITIONER

## 2022-08-28 PROCEDURE — 74011250636 HC RX REV CODE- 250/636: Performed by: INTERNAL MEDICINE

## 2022-08-28 PROCEDURE — 84100 ASSAY OF PHOSPHORUS: CPT

## 2022-08-28 PROCEDURE — 85025 COMPLETE CBC W/AUTO DIFF WBC: CPT

## 2022-08-28 PROCEDURE — 83735 ASSAY OF MAGNESIUM: CPT

## 2022-08-28 RX ORDER — AMOXICILLIN AND CLAVULANATE POTASSIUM 875; 125 MG/1; MG/1
1 TABLET, FILM COATED ORAL EVERY 12 HOURS
Qty: 20 TABLET | Refills: 0 | Status: SHIPPED | OUTPATIENT
Start: 2022-08-28 | End: 2022-09-07

## 2022-08-28 RX ADMIN — HYDROCODONE BITARTRATE AND ACETAMINOPHEN 1 TABLET: 5; 325 TABLET ORAL at 10:53

## 2022-08-28 RX ADMIN — VANCOMYCIN HYDROCHLORIDE 1000 MG: 1 INJECTION, POWDER, LYOPHILIZED, FOR SOLUTION INTRAVENOUS at 08:23

## 2022-08-28 RX ADMIN — CARVEDILOL 6.25 MG: 6.25 TABLET, FILM COATED ORAL at 08:22

## 2022-08-28 RX ADMIN — DOCUSATE SODIUM 100 MG: 100 CAPSULE, LIQUID FILLED ORAL at 08:22

## 2022-08-28 RX ADMIN — Medication 1000 UNITS: at 08:22

## 2022-08-28 RX ADMIN — FERROUS SULFATE TAB 325 MG (65 MG ELEMENTAL FE) 325 MG: 325 (65 FE) TAB at 08:22

## 2022-08-28 RX ADMIN — PIPERACILLIN AND TAZOBACTAM 3.38 G: 3; .375 INJECTION, POWDER, FOR SOLUTION INTRAVENOUS at 08:22

## 2022-08-28 RX ADMIN — FINASTERIDE 5 MG: 5 TABLET, FILM COATED ORAL at 08:22

## 2022-08-28 RX ADMIN — APIXABAN 5 MG: 5 TABLET, FILM COATED ORAL at 08:22

## 2022-08-28 RX ADMIN — PIPERACILLIN AND TAZOBACTAM 3.38 G: 3; .375 INJECTION, POWDER, FOR SOLUTION INTRAVENOUS at 01:11

## 2022-08-28 RX ADMIN — HYDROCODONE BITARTRATE AND ACETAMINOPHEN 1 TABLET: 5; 325 TABLET ORAL at 01:10

## 2022-08-28 RX ADMIN — DILTIAZEM HYDROCHLORIDE 240 MG: 240 CAPSULE, COATED, EXTENDED RELEASE ORAL at 08:22

## 2022-08-28 RX ADMIN — PANTOPRAZOLE SODIUM 40 MG: 40 TABLET, DELAYED RELEASE ORAL at 08:22

## 2022-08-28 NOTE — PROGRESS NOTES
Uneventful shift, pt has d/orders,  D/C instructions review with pt, iv removed, pt ( all belongings taken with patient) wheeled down to transporting vehicle; no distress noted

## 2022-08-28 NOTE — PROGRESS NOTES
Problem: Pain  Goal: *Control of Pain  Outcome: Progressing Towards Goal     Problem: Falls - Risk of  Goal: *Absence of Falls  Description: Document Toyin Fall Risk and appropriate interventions in the flowsheet.   Outcome: Progressing Towards Goal  Note: Fall Risk Interventions:  Mobility Interventions: Bed/chair exit alarm         Medication Interventions: Bed/chair exit alarm         History of Falls Interventions: Bed/chair exit alarm         Problem: Cellulitis Care Plan (Adult)  Goal: *Control of acute pain  Outcome: Progressing Towards Goal  Goal: *Skin integrity maintained  Outcome: Progressing Towards Goal

## 2022-08-28 NOTE — DISCHARGE SUMMARY
Discharge Summary     Patient: María Mcgowan MRN: [id number]  SSN: [id number]    YOB: 1950  Age: 71 y.o. Sex: male       Admit Date: 8/25/2022    Discharge Date: 8/28/2022      Admission Diagnoses: Cellulitis of right leg [L03.115]; Chronic a-fib ([facility] [email or web address] 75.) [I48.20]    Discharge Diagnoses:   Problem List as of 8/28/2022 Date Reviewed: 8/25/2022            Codes Class Noted - Resolved    Chronic a-fib ([facility] ICD-10-CM: I48.20  ICD-9-CM: 427.31  8/25/2022 - Present        * (Principal) Cellulitis of right leg ICD-10-CM: L03.115  ICD-9-CM: 682.6  8/25/2022 - Present        Chronic atrial fibrillation ([facility] [[email or web address] 75.) ICD-10-CM: I48.20  ICD-9-CM: 427.31  8/18/2022 - Present        OA (osteoarthritis) of knee ICD-10-CM: M17.10  ICD-9-CM: 715.36  8/17/2022 - Present        Post-operative complication VPO-49-KS: S77. 9XXA  ICD-9-CM: 998.9  8/17/2022 - Present        Prostate nodule ICD-10-CM: N40.2  ICD-9-CM: 600.10  7/9/2018 - Present        BPH with obstruction/lower urinary tract symptoms ICD-10-CM: N40.1, N13.8  ICD-9-CM: 600.01, 599.69  7/9/2018 - Present        Recurrent UTI ICD-10-CM: N39.0  ICD-9-CM: 599.0  7/9/2018 - Present            Discharge Condition: Stable    Hospital Course: María Mcgowan is a 71 y.o. male with a past medical history significant for atrial fibrillation (on Eliquis) HTN, HLP, BPH, anemia, and a recent elective right TKA on 8/17/2022, and postoperative bleeding who was referred today to the ED from his physical therapy department for concerns about possible cellulitis in his right leg. History is obtained from the patient in the ED. Patient reports he has been having increased redness, swelling, warmth, and pain in his right leg in the last 3 days. He saw Dr. Dheeraj Baltazar, who placed him on Keflex tablets.   Prior to today, over the previous weekend which was approximately 6 to 7 days ago, he had had some intermittent fevers with a subjective temperature of up to 101.4, with chills, which she had managed with Tylenol. He also reports some nausea and vomiting that he associates with his Percocet but since Percocet was switched to [place], his GI symptoms got better. No chest pain, shortness of breath, abdominal pain or distention. No urinary symptoms. No other complaints verbalized. Hospitalist was asked to admit. Patient's erythema and swelling improved with IV vancomycin and zosyn. CT was negative for osteomyelitis. Will discharge  him on augmentin and encouraged leg elevation. Physical exam:     Visit Vitals  /72 (BP 1 Location: Left upper arm, BP Patient Position: At rest)   Pulse 72   Temp 97 °F (36.1 °C)   Resp 17   Ht 6' 4\" (1.93 m)   Wt 136 kg (299 lb 14.4 oz)   SpO2 100%   BMI 36.50 kg/m²     Gen: NAD  Neuro: AAOx3  Chest: RRR, no murmurs  Lungs: CTAB, no wheezing or rhonchi  Abdomen: S/NT/ND/normoactive bowel sounds  Extremities: Right TKA incision is clean/dry/intact, no pus or drainage, improving erythema on right shin. Palpable peripheral pulses  Skin: No rashes      Consults: Orthopedics    Significant Diagnostic Studies:     CT rt leg with contrast:    IMPRESSION     1.  Knee arthroplasty without gross complications. 2. Lower extremity edema and skin thickening most suggestive of cellulitis. No  evidence for osteomyelitis. 3. Joint effusion. Disposition: home    Discharge Medications:     Discharge Medication List as of 8/28/2022 12:51 PM        START taking these medications    Details   amoxicillin-clavulanate (Augmentin) 875-125 mg per tablet Take 1 Tablet by mouth every twelve (12) hours for 10 days. , Normal, Disp-20 Tablet, R-0           CONTINUE these medications which have NOT CHANGED    Details   calcium carbonate (OS-CHARLOTTE) 500 mg calcium (1,250 mg) tablet 2 tablet, Historical Med      HYDROcodone-acetaminophen (NORCO) 5-325 mg per tablet Take 1 Tablet by mouth every six (6) hours as needed for Pain for up to 8 days.  Max Daily Amount: 4 Tablets., Normal, Disp-30 Tablet, R-0      ondansetron (ZOFRAN ODT) 4 mg disintegrating tablet Take 1 Tablet by mouth every eight (8) hours as needed for Nausea, Vomiting or Nausea or Vomiting for up to 20 doses. , Normal, Disp-20 Tablet, R-0      apixaban (ELIQUIS) 5 mg tablet Take 5 mg by mouth two (2) times a day., Historical Med      pantoprazole (PROTONIX) 40 mg tablet Historical Med      finasteride (PROSCAR) 5 mg tablet TAKE 1 TABLET DAILY, Normal, Disp-90 Tab, R-3      carvedilol (COREG) 6.25 mg tablet Take  by mouth two (2) times daily (with meals). , Historical Med      docusate sodium (COLACE) 100 mg capsule Take 100 mg by mouth., Historical Med      cholecalciferol (VITAMIN D3) 25 mcg (1,000 unit) cap Take 5,000 Units by mouth daily. , Historical Med      ferrous sulfate 325 mg (65 mg iron) tablet Take  by mouth Daily (before breakfast). , Historical Med      dilTIAZem CD (CARDIZEM CD) 240 mg ER capsule Historical Med      acetaminophen 325 mg cap Tylenol 325 mg capsule   PRN, Historical Med                Activity: Activity as tolerated  Diet: Regular Diet  Wound Care: Keep wound clean and dry    >35 minutes spent on discharge    Follow-up Appointments   Procedures    FOLLOW UP VISIT Appointment in: One Week     Standing Status:   Standing     Number of Occurrences:   1     Order Specific Question:   Appointment in     Answer:    One Week       Signed By: Nesha Mahan MD     August 28, 2022

## 2022-08-29 ENCOUNTER — TELEPHONE (OUTPATIENT)
Dept: ORTHOPEDIC SURGERY | Age: 72
End: 2022-08-29

## 2022-08-29 DIAGNOSIS — Z96.651 STATUS POST RIGHT KNEE REPLACEMENT: ICD-10-CM

## 2022-08-29 RX ORDER — HYDROCODONE BITARTRATE AND ACETAMINOPHEN 5; 325 MG/1; MG/1
1 TABLET ORAL
Qty: 30 TABLET | Refills: 0 | Status: SHIPPED | OUTPATIENT
Start: 2022-08-29 | End: 2022-09-06 | Stop reason: SDUPTHER

## 2022-08-30 ENCOUNTER — OFFICE VISIT (OUTPATIENT)
Dept: ORTHOPEDIC SURGERY | Age: 72
End: 2022-08-30
Payer: MEDICARE

## 2022-08-30 DIAGNOSIS — Z96.651 STATUS POST RIGHT KNEE REPLACEMENT: Primary | ICD-10-CM

## 2022-08-30 PROCEDURE — 99024 POSTOP FOLLOW-UP VISIT: CPT | Performed by: ORTHOPAEDIC SURGERY

## 2022-08-30 RX ORDER — TRIAMCINOLONE ACETONIDE 1 MG/G
CREAM TOPICAL
Qty: 30 G | Refills: 2 | Status: ON HOLD | OUTPATIENT
Start: 2022-08-30 | End: 2022-10-17

## 2022-08-30 NOTE — PROGRESS NOTES
Name: Savanna Ash    : 1950     Service Dept: [facility]    Chief Complaint   Patient presents with    Surgical Follow-up    Knee Pain        There were no vitals taken for this visit. No Known Allergies     Current Outpatient Medications   Medication Sig Dispense Refill    triamcinolone acetonide (KENALOG) 0.1 % topical cream APPLY A THIN LAYER TO THE AFFECTED AREA(S) BY TOPICAL ROUTE 2 TIMES PER DAY FOR 7-10 DAYS 30 g 2    HYDROcodone-acetaminophen (NORCO) 5-325 mg per tablet Take 1 Tablet by mouth every six (6) hours as needed for Pain for up to 8 days. Max Daily Amount: 4 Tablets. 30 Tablet 0    amoxicillin-clavulanate (Augmentin) 875-125 mg per tablet Take 1 Tablet by mouth every twelve (12) hours for 10 days. 20 Tablet 0    calcium carbonate (OS-CHARLOTTE) 500 mg calcium (1,250 mg) tablet 2 tablet      ondansetron (ZOFRAN ODT) 4 mg disintegrating tablet Take 1 Tablet by mouth every eight (8) hours as needed for Nausea, Vomiting or Nausea or Vomiting for up to 20 doses. 20 Tablet 0    apixaban (ELIQUIS) 5 mg tablet Take 5 mg by mouth two (2) times a day. acetaminophen 325 mg cap Tylenol 325 mg capsule   PRN      pantoprazole (PROTONIX) 40 mg tablet       finasteride (PROSCAR) 5 mg tablet TAKE 1 TABLET DAILY 90 Tab 3    carvedilol (COREG) 6.25 mg tablet Take  by mouth two (2) times daily (with meals). docusate sodium (COLACE) 100 mg capsule Take 100 mg by mouth. cholecalciferol (VITAMIN D3) 25 mcg (1,000 unit) cap Take 5,000 Units by mouth daily. ferrous sulfate 325 mg (65 mg iron) tablet Take  by mouth Daily (before breakfast). dilTIAZem CD (CARDIZEM CD) 240 mg ER capsule         Patient Active Problem List   Diagnosis Code    Prostate nodule N40.2    BPH with obstruction/lower urinary tract symptoms N40.1, N13.8    Recurrent UTI N39.0    OA (osteoarthritis) of knee M17.10    Post-operative complication R03. 9XXA    Chronic atrial fibrillation (HCC) I48.20    Chronic a-fib (HCC) I48.20    Cellulitis of right leg L03.115      History reviewed. No pertinent family history. Social History     Socioeconomic History    Marital status:    Tobacco Use    Smoking status: Never    Smokeless tobacco: Never   Vaping Use    Vaping Use: Never used   Substance and Sexual Activity    Alcohol use: No      Past Surgical History:   Procedure Laterality Date    HX PARATHYROIDECTOMY        Past Medical History:   Diagnosis Date    Chronic atrial fibrillation (HCC)     Chronic diarrhea     HLD (hyperlipidemia)     HTN (hypertension)     Hyperparathyroidism (HCC)     Impaired glucose tolerance     Obesity     Pleural effusion         I have reviewed and agree with [address] [place] [address] Nw and ROS and intake form in chart and the record furthermore I have reviewed prior medical record(s) regarding this patients care during this appointment. Review of Systems:   Patient is a pleasant appearing individual, appropriately dressed, well hydrated, well nourished, who is alert, appropriately oriented for age, and in no acute distress with a walker gait and normal affect who does not appear to be in any significant pain. Physical Exam:  Left Knee - Full Range of Motion, No crepitation, Grossly neurovascularly intact, Good cap refill, No skin lesion, No effusion, No gross instability, No point tenderness, No quadriceps weakness, No medial or lateral joint line tenderness, Negative Lachman's, Negative Joanie's exam.    Encounter Diagnoses     ICD-10-CM ICD-9-CM   1. Status post right knee replacement  Z96.651 V43.65       HPI:  The patient is here status post right total knee replacement, doing well. He is about a week and a half out from his surgery. Having a little bit of erythema and swelling. Assessment/Plan:  Plan at this point, ice, elevate, antiinflammatories, activities as tolerated started, no restrictions from my standpoint.   We will see the patient back in about a week for a routine followup and go from there. As part of continued conservative pain management options the patient was advised to utilize Tylenol or OTC NSAIDS as long as it is not medically contraindicated. Return to Office: Follow-up and Dispositions    Return in about 2 weeks (around 9/13/2022) for w/ Dr Darinel Jimenez. Scribed by Gume Gil LPN as dictated by [facility] [place] JOCELYNE Jimenez MD.  Documentation True and Accepted Misha JOCELYNE Jimenez MD

## 2022-08-30 NOTE — LETTER
8/30/2022    Patient: Tang Cardenas   YOB: 1950   Date of Visit: 8/30/2022     Wyatt Arellano MD  [address] [place] [address]  Via Fax: [phone]    Dear Wyatt Arellano MD,      Thank you for referring Mr. Jack Espitia to [address] [place] [address] for evaluation. My notes for this consultation are attached. If you have questions, please do not hesitate to call me. I look forward to following your patient along with you.       Sincerely,    Nahun Evans MD

## 2022-08-30 NOTE — PATIENT INSTRUCTIONS
Knee Pain or Injury: Care Instructions  Your Care Instructions     Injuries are a common cause of knee problems. Sudden (acute) injuries may be caused by a direct blow to the knee. They can also be caused by abnormal twisting, bending, or falling on the knee. Pain, bruising, or swelling may be severe, and may start within minutes of the injury. Overuse is another cause of knee pain. Other causes are climbing stairs, kneeling, and other activities that use the knee. Everyday wear and tear, especially as you get older, also can cause knee pain. Rest, along with home treatment, often relieves pain and allows your knee to heal. If you have a serious knee injury, you may need tests and treatment. Follow-up care is a key part of your treatment and safety. Be sure to make and go to all appointments, and call your doctor if you are having problems. It's also a good idea to know your test results and keep a list of the medicines you take. How can you care for yourself at home? Be safe with medicines. Read and follow all instructions on the label. If the doctor gave you a prescription medicine for pain, take it as prescribed. If you are not taking a prescription pain medicine, ask your doctor if you can take an over-the-counter medicine. Rest and protect your knee. Take a break from any activity that may cause pain. Put ice or a cold pack on your knee for 10 to 20 minutes at a time. Put a thin cloth between the ice and your skin. Prop up a sore knee on a pillow when you ice it or anytime you sit or lie down for the next 3 days. Try to keep it above the level of your heart. This will help reduce swelling. If your knee is not swollen, you can put moist heat, a heating pad, or a warm cloth on your knee. If your doctor recommends an elastic bandage, sleeve, or other type of support for your knee, wear it as directed. Follow your doctor's instructions about how much weight you can put on your leg.  Use a cane, crutches, or a walker as instructed. Follow your doctor's instructions about activity during your healing process. If you can do mild exercise, slowly increase your activity. Reach and stay at a healthy weight. Extra weight can strain the joints, especially the knees and hips, and make the pain worse. Losing even a few pounds may help. When should you call for help? Call 911 anytime you think you may need emergency care. For example, call if:    You have symptoms of a blood clot in your lung (called a pulmonary embolism). These may include:  Sudden chest pain. Trouble breathing. Coughing up blood. Call your doctor now or seek immediate medical care if:    You have severe or increasing pain. Your leg or foot turns cold or changes color. You cannot stand or put weight on your knee. Your knee looks twisted or bent out of shape. You cannot move your knee. You have signs of infection, such as: Increased pain, swelling, warmth, or redness. Red streaks leading from the knee. Pus draining from a place on your knee. A fever. You have signs of a blood clot in your leg (called a deep vein thrombosis), such as:  Pain in your calf, back of the knee, thigh, or groin. Redness and swelling in your leg or groin. Watch closely for changes in your health, and be sure to contact your doctor if:    You have tingling, weakness, or numbness in your knee. You have any new symptoms, such as swelling. You have bruises from a knee injury that last longer than 2 weeks. You do not get better as expected. Where can you learn more? Go to http://www.gray.com/  Enter K195 in the search box to learn more about \"Knee Pain or Injury: Care Instructions. \"  Current as of: July 1, 2021               Content Version: 13.2  © 2006-2022 [employer].    Care instructions adapted under license by [employer] (which disclaims liability or warranty for this information). If you have questions about a medical condition or this instruction, always ask your healthcare professional. [place] [address] any warranty or liability for your use of this information.

## 2022-08-31 LAB
BACTERIA SPEC CULT: NORMAL
BACTERIA SPEC CULT: NORMAL
SPECIAL REQUESTS,SREQ: NORMAL
SPECIAL REQUESTS,SREQ: NORMAL

## 2022-09-06 ENCOUNTER — OFFICE VISIT (OUTPATIENT)
Dept: ORTHOPEDIC SURGERY | Age: 72
End: 2022-09-06
Payer: MEDICARE

## 2022-09-06 DIAGNOSIS — Z96.651 STATUS POST RIGHT KNEE REPLACEMENT: ICD-10-CM

## 2022-09-06 PROCEDURE — 1123F ACP DISCUSS/DSCN MKR DOCD: CPT | Performed by: ORTHOPAEDIC SURGERY

## 2022-09-06 PROCEDURE — G8417 CALC BMI ABV UP PARAM F/U: HCPCS | Performed by: ORTHOPAEDIC SURGERY

## 2022-09-06 PROCEDURE — 99213 OFFICE O/P EST LOW 20 MIN: CPT | Performed by: ORTHOPAEDIC SURGERY

## 2022-09-06 PROCEDURE — G8432 DEP SCR NOT DOC, RNG: HCPCS | Performed by: ORTHOPAEDIC SURGERY

## 2022-09-06 PROCEDURE — 1111F DSCHRG MED/CURRENT MED MERGE: CPT | Performed by: ORTHOPAEDIC SURGERY

## 2022-09-06 PROCEDURE — 3017F COLORECTAL CA SCREEN DOC REV: CPT | Performed by: ORTHOPAEDIC SURGERY

## 2022-09-06 PROCEDURE — 1101F PT FALLS ASSESS-DOCD LE1/YR: CPT | Performed by: ORTHOPAEDIC SURGERY

## 2022-09-06 PROCEDURE — G8427 DOCREV CUR MEDS BY ELIG CLIN: HCPCS | Performed by: ORTHOPAEDIC SURGERY

## 2022-09-06 PROCEDURE — G8536 NO DOC ELDER MAL SCRN: HCPCS | Performed by: ORTHOPAEDIC SURGERY

## 2022-09-06 RX ORDER — HYDROCODONE BITARTRATE AND ACETAMINOPHEN 5; 325 MG/1; MG/1
1 TABLET ORAL
Qty: 30 TABLET | Refills: 0 | Status: SHIPPED | OUTPATIENT
Start: 2022-09-06 | End: 2022-09-14

## 2022-09-06 NOTE — PATIENT INSTRUCTIONS
Knee Pain or Injury: Care Instructions  Your Care Instructions     Injuries are a common cause of knee problems. Sudden (acute) injuries may be caused by a direct blow to the knee. They can also be caused by abnormal twisting, bending, or falling on the knee. Pain, bruising, or swelling may be severe, and may start within minutes of the injury. Overuse is another cause of knee pain. Other causes are climbing stairs, kneeling, and other activities that use the knee. Everyday wear and tear, especially as you get older, also can cause knee pain. Rest, along with home treatment, often relieves pain and allows your knee to heal. If you have a serious knee injury, you may need tests and treatment. Follow-up care is a key part of your treatment and safety. Be sure to make and go to all appointments, and call your doctor if you are having problems. It's also a good idea to know your test results and keep a list of the medicines you take. How can you care for yourself at home? Be safe with medicines. Read and follow all instructions on the label. If the doctor gave you a prescription medicine for pain, take it as prescribed. If you are not taking a prescription pain medicine, ask your doctor if you can take an over-the-counter medicine. Rest and protect your knee. Take a break from any activity that may cause pain. Put ice or a cold pack on your knee for 10 to 20 minutes at a time. Put a thin cloth between the ice and your skin. Prop up a sore knee on a pillow when you ice it or anytime you sit or lie down for the next 3 days. Try to keep it above the level of your heart. This will help reduce swelling. If your knee is not swollen, you can put moist heat, a heating pad, or a warm cloth on your knee. If your doctor recommends an elastic bandage, sleeve, or other type of support for your knee, wear it as directed. Follow your doctor's instructions about how much weight you can put on your leg.  Use a cane, crutches, or a walker as instructed. Follow your doctor's instructions about activity during your healing process. If you can do mild exercise, slowly increase your activity. Reach and stay at a healthy weight. Extra weight can strain the joints, especially the knees and hips, and make the pain worse. Losing even a few pounds may help. When should you call for help? Call 911 anytime you think you may need emergency care. For example, call if:    You have symptoms of a blood clot in your lung (called a pulmonary embolism). These may include:  Sudden chest pain. Trouble breathing. Coughing up blood. Call your doctor now or seek immediate medical care if:    You have severe or increasing pain. Your leg or foot turns cold or changes color. You cannot stand or put weight on your knee. Your knee looks twisted or bent out of shape. You cannot move your knee. You have signs of infection, such as: Increased pain, swelling, warmth, or redness. Red streaks leading from the knee. Pus draining from a place on your knee. A fever. You have signs of a blood clot in your leg (called a deep vein thrombosis), such as:  Pain in your calf, back of the knee, thigh, or groin. Redness and swelling in your leg or groin. Watch closely for changes in your health, and be sure to contact your doctor if:    You have tingling, weakness, or numbness in your knee. You have any new symptoms, such as swelling. You have bruises from a knee injury that last longer than 2 weeks. You do not get better as expected. Where can you learn more? Go to http://www.gray.com/  Enter K195 in the search box to learn more about \"Knee Pain or Injury: Care Instructions. \"  Current as of: July 1, 2021               Content Version: 13.2  © 2006-2022 [employer].    Care instructions adapted under license by [employer] (which disclaims liability or warranty for this information). If you have questions about a medical condition or this instruction, always ask your healthcare professional. [place] [address] any warranty or liability for your use of this information.

## 2022-09-06 NOTE — PROGRESS NOTES
Name: Deven Rosales    : 1950     Service Dept: [address] [place] [address] [facility]    Chief Complaint   Patient presents with    Surgical Follow-up    Knee Pain        There were no vitals taken for this visit. No Known Allergies     Current Outpatient Medications   Medication Sig Dispense Refill    HYDROcodone-acetaminophen (NORCO) 5-325 mg per tablet Take 1 Tablet by mouth every six (6) hours as needed for Pain for up to 8 days. Max Daily Amount: 4 Tablets. 30 Tablet 0    triamcinolone acetonide (KENALOG) 0.1 % topical cream APPLY A THIN LAYER TO THE AFFECTED AREA(S) BY TOPICAL ROUTE 2 TIMES PER DAY FOR 7-10 DAYS 30 g 2    amoxicillin-clavulanate (Augmentin) 875-125 mg per tablet Take 1 Tablet by mouth every twelve (12) hours for 10 days. 20 Tablet 0    calcium carbonate (OS-CHARLOTTE) 500 mg calcium (1,250 mg) tablet 2 tablet      ondansetron (ZOFRAN ODT) 4 mg disintegrating tablet Take 1 Tablet by mouth every eight (8) hours as needed for Nausea, Vomiting or Nausea or Vomiting for up to 20 doses. 20 Tablet 0    apixaban (ELIQUIS) 5 mg tablet Take 5 mg by mouth two (2) times a day. acetaminophen 325 mg cap Tylenol 325 mg capsule   PRN      pantoprazole (PROTONIX) 40 mg tablet       finasteride (PROSCAR) 5 mg tablet TAKE 1 TABLET DAILY 90 Tab 3    carvedilol (COREG) 6.25 mg tablet Take  by mouth two (2) times daily (with meals). docusate sodium (COLACE) 100 mg capsule Take 100 mg by mouth. cholecalciferol (VITAMIN D3) 25 mcg (1,000 unit) cap Take 5,000 Units by mouth daily. ferrous sulfate 325 mg (65 mg iron) tablet Take  by mouth Daily (before breakfast). dilTIAZem CD (CARDIZEM CD) 240 mg ER capsule         Patient Active Problem List   Diagnosis Code    Prostate nodule N40.2    BPH with obstruction/lower urinary tract symptoms N40.1, N13.8    Recurrent UTI N39.0    OA (osteoarthritis) of knee M17.10    Post-operative complication P01. 9XXA    Chronic atrial fibrillation (HCC) I48.20    Chronic a-fib (HCC) I48.20    Cellulitis of right leg L03.115      History reviewed. No pertinent family history. Social History     Socioeconomic History    Marital status:    Tobacco Use    Smoking status: Never    Smokeless tobacco: Never   Vaping Use    Vaping Use: Never used   Substance and Sexual Activity    Alcohol use: No      Past Surgical History:   Procedure Laterality Date    HX PARATHYROIDECTOMY        Past Medical History:   Diagnosis Date    Chronic atrial fibrillation (HCC)     Chronic diarrhea     HLD (hyperlipidemia)     HTN (hypertension)     Hyperparathyroidism (HCC)     Impaired glucose tolerance     Obesity     Pleural effusion         I have reviewed and agree with [address] [place] [address] Nw and ROS and intake form in chart and the record furthermore I have reviewed prior medical record(s) regarding this patients care during this appointment. Review of Systems:   Patient is a pleasant appearing individual, appropriately dressed, well hydrated, well nourished, who is alert, appropriately oriented for age, and in no acute distress with a normal gait and normal affect who does not appear to be in any significant pain. Physical Exam:  Right knee - Neurovascularly intact with good cap refill, full range of motion and full strength, well healed incision noted, no swelling, no erythema, no instability. Left knee - Decrease range of motion with flexion, Some crepitation, Grossly neurovascularly intact, Good cap refill, No skin lesion, Moderate swelling, No gross instability, Some quadriceps weakness    Encounter Diagnoses     ICD-10-CM ICD-9-CM   1. Status post right knee replacement  Z96.651 V43.65       HPI:  The patient is status post right total knee replacement, doing well, progressively getting better. Incision clean, dry, and intact. No septic signs. Already down to a cane now. Still having a little bit of tightness.     Assessment/Plan:  Plan at this point, activities as tolerated started. We will see the patient back in about 3-4 weeks for a final check. He may or may not want to do the opposite site depending on how his recovery is going and we will go from there. As part of continued conservative pain management options the patient was advised to utilize Tylenol or OTC NSAIDS as long as it is not medically contraindicated. Return to Office: Follow-up and Dispositions    Return in about 4 weeks (around 10/4/2022) for marisa/ Nathan Santos. Scribed by Gregorio Steve LPN as dictated by [facility] A. Monico Nageotte, MD.  Documentation True and Accepted [facility] A. Monico Nageotte, MD

## 2022-09-06 NOTE — LETTER
9/8/2022    Patient: Teresa Clark   YOB: 1950   Date of Visit: 9/6/2022     Gilberto Mosqueda MD  [address] [address]  Via Fax: [phone]    Dear Gilberto Mosqueda MD,      Thank you for referring Mr. Any Rhodes to [address] [place] [address] for evaluation. My notes for this consultation are attached. If you have questions, please do not hesitate to call me. I look forward to following your patient along with you.       Sincerely,    Ulises Conroy MD

## 2022-09-08 ENCOUNTER — HOSPITAL ENCOUNTER (OUTPATIENT)
Dept: PHYSICAL THERAPY | Age: 72
Discharge: HOME | End: 2022-09-08
Payer: MEDICARE

## 2022-09-08 PROCEDURE — 97016 VASOPNEUMATIC DEVICE THERAPY: CPT

## 2022-09-08 PROCEDURE — 97110 THERAPEUTIC EXERCISES: CPT

## 2022-09-08 PROCEDURE — 97163 PT EVAL HIGH COMPLEX 45 MIN: CPT

## 2022-09-08 NOTE — THERAPY EVALUATION
[address] [facility] Yamilka Perkins, [address]  PLAN OF CARE / STATEMENT OF MEDICAL NECESSITY FOR PHYSICAL THERAPY SERVICES  Patient Name: Fouzia Rutherford : 1950   Medical   Diagnosis: Pain in right knee [M25.561] Treatment Diagnosis: Right knee pain   Onset Date: 22     Referral Source: Heike Bryant MD Start of Care [facility]): 2022   Prior Hospitalization: See medical history Provider #: [id number]   Prior Level of Function: Independent with [facility]   Comorbidities: Atrial fibrillation, HLD, HTN, Hyperparathyroidism, Obesity, R TKA   Medications: Verified on Patient Summary List   The Plan of Care and following information is based on the information from the initial evaluation.   ==========================================================================================  Assessment / Functional Analysis:    Pt is a 71y.o. year old  male who presents to outpatient clinic today s/p R TKA 22 ambulating with SPC. Pt had complications following surgery including excessive bleeding requiring hospitalization and then cellulitis requiring inpatient antibiotics. Pt presents today with impairments that include decreased right knee ROM, right knee & hip weakness, right knee edema, difficulty with functional mobility including ambulation and step negotiation, decreased standing balance and intermittent pain limiting function.  Pt will benefit from skilled PT intervention to target deficits in effort to maximize pain-free daily activity and restore functional independence, endurance & safety.   ==========================================================================================  Eval Complexity: History: HIGH Complexity :3+ comorbidities / personal factors will impact the outcome/ POC Exam:MEDIUM Complexity : 3 Standardized tests and measures addressing body structure, function, activity limitation and / or participation in recreation Presentation: MEDIUM Complexity : Evolving with changing characteristics  Clinical Decision Making:MEDIUM Complexity : FOTO score of 26-74Overall Complexity:HIGH     Problem List: pain affecting function, decrease ROM, decrease strength, edema affecting function, impaired gait/ balance, decrease ADL/ functional abilitiies, decrease activity tolerance, decrease flexibility/ joint mobility, and decrease transfer abilities   Treatment Plan may include any combination of the following: Therapeutic exercise, Therapeutic activities, Neuromuscular re-education, Physical agent/modality, Gait/balance training, Manual therapy, Patient education, Functional mobility training, and Stair training  Patient / Family readiness to learn indicated by: asking questions, trying to perform skills, and interest  Persons(s) to be included in education: patient (P)  Barriers to Learning/Limitations: None      Patient self reported health status: good  Rehabilitation Potential: good    Objective Measures:  Palpation: gross tenderness along R knee and calf; decreased to light touch along incision; incision healing appropriately         ROM / Strength  [] Unable to assess                  AROM                         PROM                     Strength       Left Right Left Right Left Right   Hip Flexion         5/5 4+/5     Extension                 Abduction                 Adduction               Knee Flexion 122 88*   90* 5/5 4+/5*     Extension -2 -7*     5/5 4/5*   Ankle Plantarflexion         5/5 5/5     Dorsiflexion         5/5 5/5   *indicative of pain     Patellar Mobility:     Hypermobile:       [] Left   [] Right         Hypomobile:   [] Left   [x] Right     Girth Measurements:               Midpatellar:                             Left 50.0 cm       Right 52.5 cm                               .   Gait:                [] Normal    [] Abnormal    [] Antalgic    [] NWB    Device: SPC >noAD                          Distance: 300 feet  Comments: antalgic, decreased kush, asymmetrical stance times, decreased knee flexion, cues for initial heel contact and posture; step negotiation with step to pattern and single handrail         Balance: Standing static: Good, Standing dynamic: Fair     Other tests/comments: FOTO: 38, LEFS: 21.5                  Short Term Goals:  Pt will be independent with HEP to improve right knee ROM & strength in 3 weeks. Pt will improve R knee PROM -5-120 degrees for improved ADL efficiency in 3 weeks. Pt will improve FOTO score >40 for improved functional mobility in 3 weeks. Pt will be able to ambulate without AD >400 feet on level surfaces for improved household mobility in 3 weeks. Long Term Goals:  Pt will improve R knee AROM to -2-120 degrees for improved ability to get in/out of car in 6 weeks. Pt will improve R knee strength to 5/5 for improved ability to climb steps at home in 6 weeks. Pt will improve FOTO score >50 for improved functional mobility & quality of life in 6 weeks. Pt will be able to ambulate without AD >500 feet on level/unlevel surfaces for improved ability to navigate community in 6 weeks. Frequency / Duration: Patient to be seen  2-3  times per week for 6  weeks:  Patient / Caregiver education and instruction: self care, activity modification, and exercises    Therapist Signature: Emelina Wilson PT. DPARUN Date: 1/6/9864   Certification Period: 9/8/22 - 12/7/22 Time: 10:32 AM   ===========================================================================================  I certify that the above Physical Therapy Services are being furnished while the patient is under my care. I agree with the treatment plan and certify that this therapy is necessary. Physician Signature:        Date:       Time:     Please sign and return to [facility] or you may fax the signed copy to [phone]. Please call [phone] if more information required. Thank you.

## 2022-09-08 NOTE — THERAPY EVALUATION
KNEE EVAL/ PT DAILY TREATMENT NOTE 10-18    Patient Name: Robert Strong  Date:2022  : 1950  [x]  Patient  Verified  Payor: [facility] / Plan: [place] / Product Type: Medicare /    In time:1030  Out time:1122  Total Treatment Time (min): 52  Visit #: 1    Medicare/BCBS Only   Total Timed Codes (min):  10 1:1 Treatment Time:  42     Treatment Area: Pain in right knee [M25.561]    SUBJECTIVE  Pt is s/p R TKA 22 and enters today with cane. Pt had complications following surgery including excessive bleeding requiring hospitalization and then cellulitis requiring inpatient antibiotics. Pt has Tracpatch in place and reports trying to be adherent with HEP as ability to weight bear with less pain has improved. Pt states that he has been noticing some \"clicking\" while walking. Pt lives at home alone in 2 story home with 5 steps to enter and railings present. Pt reports independence with ADLs and is back to driving himself. Pt reports most difficulty with getting out of car and walking. Pt is retired  and enjoys being outdoors and gardening.        Pt. Goals: \"to be able to walk and garden\"    Pain Level (0-10 scale): Current : 4/10   Best:  3/10   Worst: 7/10  []constant [x]intermittent [x]improving []worsening []no change since onset      Past Medical History:   Diagnosis Date    Chronic atrial fibrillation (HCC)     Chronic diarrhea     HLD (hyperlipidemia)     HTN (hypertension)     Hyperparathyroidism (HCC)     Impaired glucose tolerance     Obesity     Pleural effusion      Past Surgical History:   Procedure Laterality Date    HX PARATHYROIDECTOMY         Any medication changes, allergies to medications, adverse drug reactions, diagnosis change, or new procedure performed?: [x] No    [] Yes (see summary sheet for update)          OBJECTIVE/EXAMINATION  Palpation: gross tenderness along R knee and calf; decreased to light touch along incision; incision healing appropriately ROM / Strength  [] Unable to assess                  AROM                         PROM                     Strength     Left Right Left Right Left Right   Hip Flexion     5/5 4+/5    Extension          Abduction          Adduction         Knee Flexion 122 88*  90* 5/5 4+/5*    Extension -2 -7*   5/5 4/5*   Ankle Plantarflexion     5/5 5/5    Dorsiflexion     5/5 5/5   *indicative of pain    Patellar Mobility:     Hypermobile: [] Left   [] Right  Hypomobile: [] Left   [x] Right    Girth Measurements:     Midpatellar:               Left 50.0 cm     Right 52.5 cm                               .   Gait:  [] Normal    [] Abnormal    [] Antalgic    [] NWB    Device: SPC >noAD    Distance: 300 feet  Comments: antalgic, decreased kush, asymmetrical stance times, decreased knee flexion, cues for initial heel contact and posture; step negotiation with step to pattern and single handrail        Balance: Standing static: Good, Standing dynamic: Fair    Other tests/comments: FOTO: 38, LEFS: 21.5       Modality rationale: decrease edema and decrease pain to improve the patients ability to move/heal optimally    Min Type Additional Details    [] Estim:  []Unatt       []IFC  []Premod                        []Other:  []w/ice   []w/heat  Position:  Location:    [] Estim: []Att    []TENS instruct  []NMES                    []Other:  []w/US   []w/ice   []w/heat  Position:  Location:         []  Ultrasound: []Continuous   [] Pulsed                           []1MHz   []3MHz Location:  W/cm2:         []  Ice     []  heat  []  Ice massage  []  Laser   []  Anodyne Position:  Location:        10 [x]  Vasopneumatic Device Pressure:       [x] lo [] med [] hi   Temperature: [x] lo [] med [] hi   [x] Skin assessment post-treatment:  [x]intact [x]redness- no adverse reaction    []redness - adverse reaction:     32 min [x]Eval                  []Re-Eval       10 min Therapeutic Exercise:  [x] See flow sheet :   Rationale: increase ROM, increase strength, improve balance, and increase proprioception to improve the patients ability to maximize pain-free activity & promote functional independence            With   [x] TE   [] TA   [] neuro   [] other: Patient Education: [x] Review HEP    [] Progressed/Changed HEP based on:   [] positioning   [] body mechanics   [] transfers   [] heat/ice application    [] other:        Pain Level (0-10 scale) post treatment: 5/10      ASSESSMENT/Functional Analysis     [x]  See plan of care  []  Discharge due to:_  []  Other:_      Eyal Galan, PT, DPT 9/8/2022  10:32 AM

## 2022-09-13 ENCOUNTER — HOSPITAL ENCOUNTER (OUTPATIENT)
Dept: PHYSICAL THERAPY | Age: 72
Discharge: HOME | End: 2022-09-13
Payer: MEDICARE

## 2022-09-13 PROCEDURE — 97110 THERAPEUTIC EXERCISES: CPT

## 2022-09-13 PROCEDURE — 97016 VASOPNEUMATIC DEVICE THERAPY: CPT

## 2022-09-13 NOTE — PROGRESS NOTES
PT DAILY TREATMENT NOTE     Patient Name: Romelia Bautista  Date:2022  : 1950  [x]  Patient  Verified  Payor: Jenny Lamb / Plan: [place] MEDICARE PART A & B / Product Type: Medicare /    In time:10:00  Out time:10:50  Total Treatment Time (min): 50  Total Timed Codes (min): 40  1:1 Treatment Time (min): 40   Visit #: 2     Treatment Area: Pain in right knee [M25.561]    SUBJECTIVE  Pt arrives using SPC with forward flexed posture. He reports knee joint tightness. Pain Level (0-10 scale): 4    Any medication changes, allergies to medications, adverse drug reactions, diagnosis change, or new procedure performed?: [x] No    [] Yes (see summary sheet for update)        OBJECTIVE  Modality rationale: decrease edema, decrease inflammation, and decrease pain to improve the patients ability to recover post exercise and heal optimally.    Min Type Additional Details    [] Estim: []Att   []Unatt  []TENS instruct                 []IFC  []Premod []NMES                       []Other:  []w/US   []w/ice   []w/heat  Position:  Location:    []  Traction: [] Cervical       []Lumbar                       [] Prone          []Supine                       []Intermittent   []Continuous Lbs:  [] before manual  [] after manual    []  Ultrasound: []Continuous   [] Pulsed                           []1MHz   []3MHz Location:  W/cm2:    []  Iontophoresis with dexamethasone         Location: [] Take home patch   [] In clinic    []  Ice     []  heat  []  Ice massage Position:  Location:   10 [x]  Vasopneumatic Device Pressure: [x] lo [] med [] hi   Temp: [x] lo [] med [] hi   [] Skin assessment post-treatment:  []intact []redness- no adverse reaction       []redness - adverse reaction:           40 min Therapeutic Exercise:  [x] See flow sheet :   Rationale: increase ROM and increase strength to improve the patients ability to return to prior level of function before injury/illness with reduced pain, achieving optimal strength and function to perform household tasks, daily activities, and return to community events, and/or work. min Therapeutic Activity:  []  See flow sheet :   Rationale:       min Neuromuscular Re-education:  []  See flow sheet :   Rationale:        min Manual Therapy:     Rationale:       min Gait Training:  ___ feet with ___ device on level surfaces with ___ level of assist   Rationale: With TE  TA   NR  GT   Misc Patient Education: [x] Review HEP    [] Progressed/Changed HEP based on:   [] positioning   [] body mechanics   [] transfers   [] heat/ice application          Pain Level (0-10 scale) post treatment: 3    ASSESSMENT/Changes in Function: Session began on stepper for active warm up and to promote ROM. Followed by self stretching using slant box for Gastroc and HS with no adverse reaction. Continued with standing exercise per TKA protocol via mini squats, hamstring curls and TKE un-weighted. Verbal cues for exercise for appropriate hold times and technique to achieve optimal benefit. Seated LAQ and knee flexion on EOB to achieve ROM. Supine quad set which appeared to be most challenging this date. Vaso compression applied to R knee post exercise in effort to decrease inflammation and muscle soreness. Ambulates with no AD on level surface in clinic this date. Continue with POC. Patient will continue to benefit from skilled PT services to modify and progress therapeutic interventions, address functional mobility deficits, address ROM deficits, address strength deficits, analyze and address soft tissue restrictions, analyze and cue movement patterns, analyze and modify body mechanics/ergonomics, and assess and modify postural abnormalities to attain remaining goals.      [x]  See Plan of Care  []  See progress note/recertification  []  See Discharge Summary           PLAN  []  Upgrade activities as tolerated     [x]  Continue plan of care  []  Update interventions per flow sheet       [] Discharge due to:_  []  Other:_      SURJIT Duran 9/13/2022  11:00 AM

## 2022-09-15 ENCOUNTER — HOSPITAL ENCOUNTER (OUTPATIENT)
Dept: PHYSICAL THERAPY | Age: 72
Discharge: HOME | End: 2022-09-15
Payer: MEDICARE

## 2022-09-15 PROCEDURE — 97110 THERAPEUTIC EXERCISES: CPT

## 2022-09-15 PROCEDURE — 97016 VASOPNEUMATIC DEVICE THERAPY: CPT

## 2022-09-15 NOTE — PROGRESS NOTES
PT DAILY TREATMENT NOTE 8    Patient Name: Oneil Gottron  Date:9/15/2022  : 1950  [x]  Patient  Verified  Payor: [place] MEDICARE / Plan: [place] MEDICARE PART A & B / Product Type: Medicare /    In QGJV:[id number]  Out time:1044  Total Treatment Time (min): 48  Total Timed Codes (min): 38  1:1 Treatment Time (min): 38   Visit # 3      Treatment Area: Pain in right knee [M25.561]    SUBJECTIVE  Pt enters today with SPC, states that he relies on it when outdoors. Pt expresses frustration with TracPatch and that it is inaccurate.    Pain Level (0-10 scale): 4/10  Any medication changes, allergies to medications, adverse drug reactions, diagnosis change, or new procedure performed?: [x] No    [] Yes (see summary sheet for update)        OBJECTIVE  Modality rationale: decrease edema and decrease pain to improve the patients ability to move/heal optimally    Min Type Additional Details    [] Estim: []Att   []Unatt  []TENS instruct                 []IFC  []Premod []NMES                       []Other:  []w/US   []w/ice   []w/heat  Position:  Location:    []  Traction: [] Cervical       []Lumbar                       [] Prone          []Supine                       []Intermittent   []Continuous Lbs:  [] before manual  [] after manual    []  Ultrasound: []Continuous   [] Pulsed                           []1MHz   []3MHz Location:  W/cm2:         []  Ice     []  heat  []  Ice massage Position:  Location:   10 [x]  Vasopneumatic Device Pressure: [x] lo [] med [] hi   Temp: [x] lo [] med [] hi   [x] Skin assessment post-treatment:  [x]intact [x]redness- no adverse reaction       []redness - adverse reaction:       38 min Therapeutic Exercise:  [x] See flow sheet :   Rationale: increase ROM, increase strength, and improve balance to improve the patients ability to restore PLOF         With TE Patient Education: [x] Review HEP    [] Progressed/Changed HEP based on:   [] positioning   [] body mechanics   [] transfers   [] heat/ice application          Pain Level (0-10 scale) post treatment: 4/10    ASSESSMENT/Changes in Function: Session initiated on stepper for active warm up, no adverse reaction. Progression today via repetition and/or resistance with all activities, no complaints. SLR introduced today to promote quadriceps endurance, cues for quad setting & eccentric control. Endurance/distance increased with ambulation today, pt cued for initial heel contact and knee flexion during swing. Plan to progress CKC strengthening & standing balance challenges next week. Patient will continue to benefit from skilled PT services to modify and progress therapeutic interventions, address functional mobility deficits, address ROM deficits, address strength deficits, analyze and address soft tissue restrictions, analyze and cue movement patterns, analyze and modify body mechanics/ergonomics, and assess and modify postural abnormalities to attain remaining goals.      []  See Plan of Care  []  See progress note/recertification  []  See Discharge Summary             PLAN  []  Upgrade activities as tolerated     []  Continue plan of care  []  Update interventions per flow sheet       []  Discharge due to:_  []  Other:_      Sukh Kessler, PT, DPT 9/15/2022  9:56 AM

## 2022-09-20 ENCOUNTER — HOSPITAL ENCOUNTER (OUTPATIENT)
Dept: PHYSICAL THERAPY | Age: 72
Discharge: HOME | End: 2022-09-20
Payer: MEDICARE

## 2022-09-20 PROCEDURE — 97016 VASOPNEUMATIC DEVICE THERAPY: CPT

## 2022-09-20 PROCEDURE — 97110 THERAPEUTIC EXERCISES: CPT

## 2022-09-20 NOTE — PROGRESS NOTES
PT DAILY TREATMENT NOTE     Patient Name: Homer Azar  Date:2022  : 1950  [x]  Patient  Verified  Payor: Rubi Jude / Plan: [place] MEDICARE PART A & B / Product Type: Medicare /    In NJT  Out time:1045  Total Treatment Time (min): 50  Total Timed Codes (min): 40  1:1 Treatment Time (min): 40   Visit #: 4    Treatment Area: Pain in right knee [M25.561]    SUBJECTIVE  Pt reports minor pain and stiffness in R knee. Chief complaint is \"arthritis pain \" in R ankle. Pain Level (0-10 scale): 4    Any medication changes, allergies to medications, adverse drug reactions, diagnosis change, or new procedure performed?: [x] No    [] Yes (see summary sheet for update)    OBJECTIVE  Modality rationale: Vaso post session to decrease pain and edema from exercises. Min Type Additional Details    [] Estim: []Att   []Unatt  []TENS instruct                 []IFC  []Premod []NMES                       []Other:  []w/US   []w/ice   []w/heat  Position:  Location:    []  Traction: [] Cervical       []Lumbar                       [] Prone          []Supine                       []Intermittent   []Continuous Lbs:  [] before manual  [] after manual    []  Ultrasound: []Continuous   [] Pulsed                           []1MHz   []3MHz Location:  W/cm2:    []  Ice     []  heat  []  Ice massage Position:  Location:    []  Vasopneumatic Device Pressure: [] lo [] med [] hi   Temp: [] lo [] med [] hi   [] Skin assessment post-treatment:  []intact []redness- no adverse reaction       []redness - adverse reaction:     40 min Therapeutic Exercise:  [x] See flow sheet :   Rationale: increase ROM, increase strength, improve coordination, improve balance, and increase proprioception to improve the patients ability to restore function and mobility allowing for ease with daily activities.            With TE  TA   NR  GT   Misc Patient Education: [x] Review HEP    [] Progressed/Changed HEP based on:   [] positioning   [] body mechanics   [] transfers   [] heat/ice application        Pain Level (0-10 scale) post treatment: 4    ASSESSMENT/Changes in Function: Session began with recumbent bicycle to promote knee mobility and muscular endurance. Exercises completed per flowsheet working to increase R knee strength, mobility, stability, and AROM. Repetitions increased as tolerated. Introduced forward step ups with B UE support. Balance was tested with Ella Elizondo. Pt is progressing well through TKA protocol with proper form and kush shown with the majority of all exercises. Vaso post rehab to combat soreness. Will continue POC working to achieve short term goals next visit. Patient will continue to benefit from skilled PT services to modify and progress therapeutic interventions, address functional mobility deficits, address ROM deficits, address strength deficits, analyze and address soft tissue restrictions, analyze and cue movement patterns, analyze and modify body mechanics/ergonomics, assess and modify postural abnormalities, and address imbalance/dizziness to attain remaining goals.      [x]  See Plan of Care  []  See progress note/recertification  []  See Discharge Summary         PLAN  []  Upgrade activities as tolerated     [x]  Continue plan of care  []  Update interventions per flow sheet       []  Discharge due to:_  []  Other:    Mickey Stanley  9/20/2022  10:39 AM

## 2022-09-22 ENCOUNTER — APPOINTMENT (OUTPATIENT)
Dept: PHYSICAL THERAPY | Age: 72
End: 2022-09-22
Payer: MEDICARE

## 2022-09-26 DIAGNOSIS — Z96.652 STATUS POST LEFT KNEE REPLACEMENT: ICD-10-CM

## 2022-09-26 DIAGNOSIS — M25.562 LEFT KNEE PAIN, UNSPECIFIED CHRONICITY: Primary | ICD-10-CM

## 2022-09-27 ENCOUNTER — OFFICE VISIT (OUTPATIENT)
Dept: ORTHOPEDIC SURGERY | Age: 72
End: 2022-09-27
Payer: MEDICARE

## 2022-09-27 ENCOUNTER — HOSPITAL ENCOUNTER (OUTPATIENT)
Dept: PHYSICAL THERAPY | Age: 72
Discharge: HOME | End: 2022-09-27
Payer: MEDICARE

## 2022-09-27 DIAGNOSIS — Z96.651 STATUS POST RIGHT KNEE REPLACEMENT: Primary | ICD-10-CM

## 2022-09-27 PROCEDURE — G8432 DEP SCR NOT DOC, RNG: HCPCS | Performed by: ORTHOPAEDIC SURGERY

## 2022-09-27 PROCEDURE — 1123F ACP DISCUSS/DSCN MKR DOCD: CPT | Performed by: ORTHOPAEDIC SURGERY

## 2022-09-27 PROCEDURE — 3017F COLORECTAL CA SCREEN DOC REV: CPT | Performed by: ORTHOPAEDIC SURGERY

## 2022-09-27 PROCEDURE — 97110 THERAPEUTIC EXERCISES: CPT

## 2022-09-27 PROCEDURE — 99204 OFFICE O/P NEW MOD 45 MIN: CPT | Performed by: ORTHOPAEDIC SURGERY

## 2022-09-27 PROCEDURE — 97016 VASOPNEUMATIC DEVICE THERAPY: CPT

## 2022-09-27 PROCEDURE — G8536 NO DOC ELDER MAL SCRN: HCPCS | Performed by: ORTHOPAEDIC SURGERY

## 2022-09-27 PROCEDURE — 1101F PT FALLS ASSESS-DOCD LE1/YR: CPT | Performed by: ORTHOPAEDIC SURGERY

## 2022-09-27 PROCEDURE — 1111F DSCHRG MED/CURRENT MED MERGE: CPT | Performed by: ORTHOPAEDIC SURGERY

## 2022-09-27 PROCEDURE — G8417 CALC BMI ABV UP PARAM F/U: HCPCS | Performed by: ORTHOPAEDIC SURGERY

## 2022-09-27 PROCEDURE — G8427 DOCREV CUR MEDS BY ELIG CLIN: HCPCS | Performed by: ORTHOPAEDIC SURGERY

## 2022-09-27 RX ORDER — NEOMYCIN SULFATE, POLYMYXIN B SULFATE, HYDROCORTISONE 3.5; 10000; 1 MG/ML; [USP'U]/ML; MG/ML
SOLUTION/ DROPS AURICULAR (OTIC)
COMMUNITY

## 2022-09-27 NOTE — PROGRESS NOTES
PT DAILY TREATMENT NOTE     Patient Name: Arya Prescott  Date:2022  : 1950  [x]  Patient  Verified  Payor: Katie Pop / Plan: [place] MEDICARE PART A & B / Product Type: Medicare /    In time: 449  Out time:   Total Treatment Time (min): 56  Total Timed Codes (min): 46  1:1 Treatment Time (min): 56   Visit # 5      Treatment Area: Pain in right knee [M25.561]    SUBJECTIVE  Pt reports continued R knee pain. He is compliant with daily HEP.   Pain Level (0-10 scale): 4/10  Any medication changes, allergies to medications, adverse drug reactions, diagnosis change, or new procedure performed?: [x] No    [] Yes (see summary sheet for update)        OBJECTIVE  Modality rationale: decrease edema, decrease inflammation, and decrease pain to improve the patients ability to ambulate   Min Type Additional Details    [] Estim: []Att   []Unatt  []TENS instruct                 []IFC  []Premod []NMES                       []Other:  []w/US   []w/ice   []w/heat  Position:  Location:    []  Traction: [] Cervical       []Lumbar                       [] Prone          []Supine                       []Intermittent   []Continuous Lbs:  [] before manual  [] after manual    []  Ultrasound: []Continuous   [] Pulsed                           []1MHz   []3MHz Location:  W/cm2:         []  Ice     []  heat  []  Ice massage Position:  Location:   10 [x]  Vasopneumatic Device Pressure: [x] lo [] med [] hi   Temp: [x] lo [] med [] hi   [] Skin assessment post-treatment:  []intact []redness- no adverse reaction       []redness - adverse reaction:       46 min Therapeutic Exercise:  [] See flow sheet :   Rationale: increase ROM and increase strength to improve the patients ability to ambulate           With TE Patient Education: [x] Review HEP    [] Progressed/Changed HEP based on:   [] positioning   [] body mechanics   [] transfers   [] heat/ice application          Pain Level (0-10 scale) post treatment: 5/10    ASSESSMENT/Changes in Function:   Session initiated on stepper followed by standing self-stretching and PRE. Today's Tx session emphasized exercises per POC to increase R knee ROM and strength with Pt tolerating Tx with no adverse effects. Pt demonstrates increased activity tolerance when performing exercises during today's Tx session and reports he is able to ambulate with less discomfort. PT will continue per POC, progressing as tolerated. Patient will continue to benefit from skilled PT services to modify and progress therapeutic interventions, address functional mobility deficits, address ROM deficits, address strength deficits, analyze and address soft tissue restrictions, analyze and cue movement patterns, analyze and modify body mechanics/ergonomics, assess and modify postural abnormalities, and address imbalance/dizziness to attain remaining goals.      []  See Plan of Care  []  See progress note/recertification  []  See Discharge Summary             PLAN  [x]  Upgrade activities as tolerated     [x]  Continue plan of care  [x]  Update interventions per flow sheet       []  Discharge due to:_  []  Other:_      Joann Canela, PT, DPT 9/27/2022  5:34 PM

## 2022-09-27 NOTE — PROGRESS NOTES
Name: Marbella Room    : 1950     Service Dept: [address] [place] [address] and Sports Medicine    Chief Complaint   Patient presents with    Knee Pain        There were no vitals taken for this visit. No Known Allergies     Current Outpatient Medications   Medication Sig Dispense Refill    neomycin-polymyxin-hydrocortisone (CORTISPORIN) otic solution 1 drop into affected eye      triamcinolone acetonide (KENALOG) 0.1 % topical cream APPLY A THIN LAYER TO THE AFFECTED AREA(S) BY TOPICAL ROUTE 2 TIMES PER DAY FOR 7-10 DAYS 30 g 2    calcium carbonate (OS-CHARLOTTE) 500 mg calcium (1,250 mg) tablet 2 tablet      ondansetron (ZOFRAN ODT) 4 mg disintegrating tablet Take 1 Tablet by mouth every eight (8) hours as needed for Nausea, Vomiting or Nausea or Vomiting for up to 20 doses. 20 Tablet 0    apixaban (ELIQUIS) 5 mg tablet Take 5 mg by mouth two (2) times a day. acetaminophen 325 mg cap Tylenol 325 mg capsule   PRN      pantoprazole (PROTONIX) 40 mg tablet       finasteride (PROSCAR) 5 mg tablet TAKE 1 TABLET DAILY 90 Tab 3    carvedilol (COREG) 6.25 mg tablet Take  by mouth two (2) times daily (with meals). docusate sodium (COLACE) 100 mg capsule Take 100 mg by mouth. cholecalciferol (VITAMIN D3) 25 mcg (1,000 unit) cap Take 5,000 Units by mouth daily. ferrous sulfate 325 mg (65 mg iron) tablet Take  by mouth Daily (before breakfast). dilTIAZem CD (CARDIZEM CD) 240 mg ER capsule         Patient Active Problem List   Diagnosis Code    Prostate nodule N40.2    BPH with obstruction/lower urinary tract symptoms N40.1, N13.8    Recurrent UTI N39.0    OA (osteoarthritis) of knee M17.10    Post-operative complication Z87. 9XXA    Chronic atrial fibrillation (HCC) I48.20    Chronic a-fib (HCC) I48.20    Cellulitis of right leg L03.115      History reviewed. No pertinent family history.    Social History     Socioeconomic History    Marital status:    Tobacco Use    Smoking status: Never    Smokeless tobacco: Never   Vaping Use    Vaping Use: Never used   Substance and Sexual Activity    Alcohol use: No      Past Surgical History:   Procedure Laterality Date    HX PARATHYROIDECTOMY        Past Medical History:   Diagnosis Date    Chronic atrial fibrillation (HCC)     Chronic diarrhea     HLD (hyperlipidemia)     HTN (hypertension)     Hyperparathyroidism (HCC)     Impaired glucose tolerance     Obesity     Pleural effusion         I have reviewed and agree with PFSH and ROS and intake form in chart and the record furthermore I have reviewed prior medical record(s) regarding this patients care during this appointment. Review of Systems:   Patient is a pleasant appearing individual, appropriately dressed, well hydrated, well nourished, who is alert, appropriately oriented for age, and in no acute distress with a normal gait and normal affect who does not appear to be in any significant pain. Physical Exam:  Right knee - Neurovascularly intact with good cap refill, full range of motion and full strength, well healed incision noted, no swelling, no erythema, no instability. Left knee - Decrease range of motion with flexion, Some crepitation, Grossly neurovascularly intact, Good cap refill, No skin lesion, Moderate swelling, No gross instability, Some quadriceps weakness    Encounter Diagnoses     ICD-10-CM ICD-9-CM   1. Status post right knee replacement  Z96.651 V43.65       Physical examination is positive for a little bit of soreness, well-healing incision, good capillary refill. HPI:  The patient is status post right total knee replacement, doing well, has no complaints. Assessment/Plan:  Plan at this point, activities as tolerated started, weightbearing started. He is going to continue with the left knee as planned for now and go from there.       As part of continued conservative pain management options the patient was advised to utilize Tylenol or OTC NSAIDS as long as it is not medically contraindicated. Return to Office: Follow-up and Dispositions    Return in about 2 weeks (around 10/11/2022). Scribed by Jordana Chowdhury LPN as dictated by [facility] JOCELYNE Bustillo MD.  Documentation True and Accepted Misha Bustillo MD

## 2022-09-27 NOTE — PATIENT INSTRUCTIONS
Knee Pain or Injury: Care Instructions  Your Care Instructions     Injuries are a common cause of knee problems. Sudden (acute) injuries may be caused by a direct blow to the knee. They can also be caused by abnormal twisting, bending, or falling on the knee. Pain, bruising, or swelling may be severe, and may start within minutes of the injury. Overuse is another cause of knee pain. Other causes are climbing stairs, kneeling, and other activities that use the knee. Everyday wear and tear, especially as you get older, also can cause knee pain. Rest, along with home treatment, often relieves pain and allows your knee to heal. If you have a serious knee injury, you may need tests and treatment. Follow-up care is a key part of your treatment and safety. Be sure to make and go to all appointments, and call your doctor if you are having problems. It's also a good idea to know your test results and keep a list of the medicines you take. How can you care for yourself at home? Be safe with medicines. Read and follow all instructions on the label. If the doctor gave you a prescription medicine for pain, take it as prescribed. If you are not taking a prescription pain medicine, ask your doctor if you can take an over-the-counter medicine. Rest and protect your knee. Take a break from any activity that may cause pain. Put ice or a cold pack on your knee for 10 to 20 minutes at a time. Put a thin cloth between the ice and your skin. Prop up a sore knee on a pillow when you ice it or anytime you sit or lie down for the next 3 days. Try to keep it above the level of your heart. This will help reduce swelling. If your knee is not swollen, you can put moist heat, a heating pad, or a warm cloth on your knee. If your doctor recommends an elastic bandage, sleeve, or other type of support for your knee, wear it as directed. Follow your doctor's instructions about how much weight you can put on your leg.  Use a cane, crutches, or a walker as instructed. Follow your doctor's instructions about activity during your healing process. If you can do mild exercise, slowly increase your activity. Reach and stay at a healthy weight. Extra weight can strain the joints, especially the knees and hips, and make the pain worse. Losing even a few pounds may help. When should you call for help? Call 911 anytime you think you may need emergency care. For example, call if:    You have symptoms of a blood clot in your lung (called a pulmonary embolism). These may include:  Sudden chest pain. Trouble breathing. Coughing up blood. Call your doctor now or seek immediate medical care if:    You have severe or increasing pain. Your leg or foot turns cold or changes color. You cannot stand or put weight on your knee. Your knee looks twisted or bent out of shape. You cannot move your knee. You have signs of infection, such as: Increased pain, swelling, warmth, or redness. Red streaks leading from the knee. Pus draining from a place on your knee. A fever. You have signs of a blood clot in your leg (called a deep vein thrombosis), such as:  Pain in your calf, back of the knee, thigh, or groin. Redness and swelling in your leg or groin. Watch closely for changes in your health, and be sure to contact your doctor if:    You have tingling, weakness, or numbness in your knee. You have any new symptoms, such as swelling. You have bruises from a knee injury that last longer than 2 weeks. You do not get better as expected. Where can you learn more? Go to http://www.gray.com/  Enter K195 in the search box to learn more about \"Knee Pain or Injury: Care Instructions. \"  Current as of: July 1, 2021               Content Version: 13.2  © 2006-2022 [employer].    Care instructions adapted under license by [employer] (which disclaims liability or warranty for this information). If you have questions about a medical condition or this instruction, always ask your healthcare professional. [place] [address] any warranty or liability for your use of this information.

## 2022-09-27 NOTE — LETTER
9/28/2022    Patient: Homer Azar   YOB: 1950   Date of Visit: 9/27/2022     Mary Lou Sheffield MD  [address] [address]  Via Fax: [phone]    Dear Mary Lou Sheffield MD,      Thank you for referring Mr. Rosa Maria Noguera to [address] [place] [address] for evaluation. My notes for this consultation are attached. If you have questions, please do not hesitate to call me. I look forward to following your patient along with you.       Sincerely,    Citlaly June MD

## 2022-09-29 ENCOUNTER — HOSPITAL ENCOUNTER (OUTPATIENT)
Dept: PHYSICAL THERAPY | Age: 72
Discharge: HOME | End: 2022-09-29
Payer: MEDICARE

## 2022-09-29 PROCEDURE — 97110 THERAPEUTIC EXERCISES: CPT

## 2022-09-29 PROCEDURE — 97016 VASOPNEUMATIC DEVICE THERAPY: CPT

## 2022-09-29 NOTE — PROGRESS NOTES
PT DAILY TREATMENT NOTE     Patient Name: Brittney Germain  Date:2022  : 1950  [x]  Patient  Verified  Payor: Marlyn Prasad / Plan: [place] MED[place] / Product Type: Medicare /    In [facility]  Out time:1048  Total Treatment Time (min): 55  Total Timed Codes (min): 45  1:1 Treatment Time (min): 45   Visit #: 6    Treatment Area: Pain in right knee [M25.561]    SUBJECTIVE  Pt reports minor pain and stiffness in R knee. \"I am not feeling well today as I am on an antibiotic for an ear infection. \".    Pain Level (0-10 scale): 4    Any medication changes, allergies to medications, adverse drug reactions, diagnosis change, or new procedure performed?: [x] No    [] Yes (see summary sheet for update)    OBJECTIVE  Modality rationale: Vaso post session to decrease pain and edema from exercises. Min Type Additional Details    [] Estim: []Att   []Unatt  []TENS instruct                 []IFC  []Premod []NMES                       []Other:  []w/US   []w/ice   []w/heat  Position:  Location:    []  Traction: [] Cervical       []Lumbar                       [] Prone          []Supine                       []Intermittent   []Continuous Lbs:  [] before manual  [] after manual    []  Ultrasound: []Continuous   [] Pulsed                           []1MHz   []3MHz Location:  W/cm2:    []  Ice     []  heat  []  Ice massage Position:  Location:   10 [x]  Vasopneumatic Device Pressure: [x] lo [] med [] hi   Temp: [x] lo [] med [] hi   [] Skin assessment post-treatment:  []intact []redness- no adverse reaction       []redness - adverse reaction:     45 min Therapeutic Exercise:  [x] See flow sheet :   Rationale: increase ROM, increase strength, improve coordination, improve balance, and increase proprioception to improve the patients ability to restore function and mobility allowing for ease with daily activities.            With TE  TA   NR  GT   Misc Patient Education: [x] Review HEP    [] Progressed/Changed HEP based on:   [] positioning   [] body mechanics   [] transfers   [] heat/ice application        Pain Level (0-10 scale) post treatment: 3    ASSESSMENT/Changes in Function: Session began with recumbent bicycle to promote knee mobility and muscular endurance. Followed by self stretching of the HS and Gastroc muscle groups. Continued forward step ups with B UE support. Balance was tested with Clearence Burger with eyes open and eyes closed. No LOB with ec but noted sway to his left. Exercises completed per flowsheet working to increase R knee strength, mobility, stability, and AROM. Pt is progressing well through TKA protocol with proper form and kush shown with the majority of all exercises. Vaso post rehab to combat soreness. Will continue POC working to achieve short term goals next visit. Patient will continue to benefit from skilled PT services to modify and progress therapeutic interventions, address functional mobility deficits, address ROM deficits, address strength deficits, analyze and address soft tissue restrictions, analyze and cue movement patterns, analyze and modify body mechanics/ergonomics, assess and modify postural abnormalities, and address imbalance/dizziness to attain remaining goals.      [x]  See Plan of Care  []  See progress note/recertification  []  See Discharge Summary         PLAN  []  Upgrade activities as tolerated     [x]  Continue plan of care  []  Update interventions per flow sheet       []  Discharge due to:_  []  Other:    Bianca Ahumada, LPTA  9/29/2022  10:55 AM

## 2022-10-04 ENCOUNTER — HOSPITAL ENCOUNTER (OUTPATIENT)
Dept: PHYSICAL THERAPY | Age: 72
Discharge: HOME | End: 2022-10-04
Payer: MEDICARE

## 2022-10-04 PROCEDURE — 97016 VASOPNEUMATIC DEVICE THERAPY: CPT

## 2022-10-04 PROCEDURE — 97110 THERAPEUTIC EXERCISES: CPT

## 2022-10-04 NOTE — PROGRESS NOTES
PT DAILY TREATMENT NOTE     Patient Name: Elisabeth Claudio  Date:10/4/2022  : 1950  [x]  Patient  Verified  Payor: [place] MEDICARE / Plan: [place] MEDICARE PART A & B / Product Type: Medicare /    In time:950 Out time:1043  Total Treatment Time (min): 53  Total Timed Codes (min): 43  1:1 Treatment Time (min): 43  Visit #: 7    Treatment Area: Pain in left knee [M25.562]    SUBJECTIVE  Pt reports minor pain and stiffness in R knee. Pt requested to make Thursday his last PT visit as he is preparing to have his L knee replaced. Discussed with evaluating therapist and a reassessment will be done upon his return. Pain Level (0-10 scale): 4    Any medication changes, allergies to medications, adverse drug reactions, diagnosis change, or new procedure performed?: [x] No    [] Yes (see summary sheet for update)    OBJECTIVE  Modality rationale: Vaso post session to decrease pain, reduce inflammation and edema from exercises.       Min Type Additional Details    [] Estim: []Att   []Unatt  []TENS instruct                 []IFC  []Premod []NMES                       []Other:  []w/US   []w/ice   []w/heat  Position:  Location:    []  Traction: [] Cervical       []Lumbar                       [] Prone          []Supine                       []Intermittent   []Continuous Lbs:  [] before manual  [] after manual    []  Ultrasound: []Continuous   [] Pulsed                           []1MHz   []3MHz Location:  W/cm2:    []  Ice     []  heat  []  Ice massage Position:  Location:   10 [x]  Vasopneumatic Device Pressure: [x] lo [] med [] hi   Temp: [x] lo [] med [] hi   [] Skin assessment post-treatment:  []intact []redness- no adverse reaction       []redness - adverse reaction:     43 min Therapeutic Exercise:  [x] See flow sheet :   Rationale: increase ROM, increase strength, improve coordination, improve balance, and increase proprioception to improve the patients ability to restore function and mobility allowing for ease with daily activities. With TE  TA   NR  GT   [facility] [place]: [x] Review HEP    [] Progressed/Changed HEP based on:   [] positioning   [] body mechanics   [] transfers   [] heat/ice application        Pain Level (0-10 scale) post treatment: 3    ASSESSMENT/Changes in Function: Session began with recumbent bicycle to promote knee mobility and muscular endurance. Followed by self stretching of the HS and Gastroc muscle groups. Continued forward step ups with B UE support. Balance was tested with Kacy Madden with eyes open and eyes closed. No LOB with ec but noted sway to his left. Exercises completed per flowsheet working to increase R knee strength, mobility, stability, and AROM. No adverse reaction with exercise. AROM of R knee 0-110 degrees. Pt is progressing well through TKA protocol with proper form and kush shown with the majority of all exercises. Vaso post rehab in effort to reduce inflammation from exercises. Will continue POC working to achieve short term goals next visit. Patient will continue to benefit from skilled PT services to modify and progress therapeutic interventions, address functional mobility deficits, address ROM deficits, address strength deficits, analyze and address soft tissue restrictions, analyze and cue movement patterns, analyze and modify body mechanics/ergonomics, assess and modify postural abnormalities, and address imbalance/dizziness to attain remaining goals.      [x]  See Plan of Care  []  See progress note/recertification  []  See Discharge Summary         PLAN  []  Upgrade activities as tolerated     [x]  Continue plan of care  []  Update interventions per flow sheet       []  Discharge due to:_  []  Other:    SURJIT Louise  10/4/2022  10:55 AM

## 2022-10-06 ENCOUNTER — HOSPITAL ENCOUNTER (OUTPATIENT)
Dept: PHYSICAL THERAPY | Age: 72
Discharge: HOME | End: 2022-10-06
Payer: MEDICARE

## 2022-10-06 PROCEDURE — 97110 THERAPEUTIC EXERCISES: CPT

## 2022-10-06 PROCEDURE — 97016 VASOPNEUMATIC DEVICE THERAPY: CPT

## 2022-10-06 NOTE — PROGRESS NOTES
PT DAILY TREATMENT NOTE 8    Patient Name: Tiffany Knowles  Date:10/6/2022  : 1950  [x]  Patient  Verified  Payor: [place] MEDICARE / Plan: [place] MEDICARE PART A & B / Product Type: Medicare /    In time:1000  Out time:1047  Total Treatment Time (min): 47  Total Timed Codes (min): 37  1:1 Treatment Time (min): 35   Visit #: 8    Treatment Area: Pain in left knee [M25.562]    SUBJECTIVE  Pt reports stiffness and tightness as chief complaint. Pain Level (0-10 scale): 4    Any medication changes, allergies to medications, adverse drug reactions, diagnosis change, or new procedure performed?: [x] No    [] Yes (see summary sheet for update)    OBJECTIVE  Modality rationale: decrease edema, decrease inflammation, and decrease pain    Min Type Additional Details    [] Estim: []Att   []Unatt  []TENS instruct                 []IFC  []Premod []NMES                       []Other:  []w/US   []w/ice   []w/heat  Position:  Location:    []  Traction: [] Cervical       []Lumbar                       [] Prone          []Supine                       []Intermittent   []Continuous Lbs:  [] before manual  [] after manual    []  Ultrasound: []Continuous   [] Pulsed                           []1MHz   []3MHz Location:  W/cm2:    []  Ice     []  heat  []  Ice massage Position:  Location:   10 [x]  Vasopneumatic Device Pressure: [x] lo [] med [] hi   Temp: [] lo [x] med [] hi   [] Skin assessment post-treatment:  []intact []redness- no adverse reaction       []redness - adverse reaction:     35 min Therapeutic Exercise:  [] See flow sheet :   Rationale: increase ROM, increase strength, improve coordination, improve balance, and increase proprioception to improve the patients ability to return to PLOF while remaining pain free.       With TE  TA   NR  GT   Misc Patient Education: [x] Review HEP    [] Progressed/Changed HEP based on:   [] positioning   [] body mechanics   [] transfers   [] heat/ice application        Pain Level (0-10 scale) post treatment: 3    ASSESSMENT/Changes in Function: Exercises completed per flowsheet working to achieve short term goals. Resistance and repetitions increased with various exercises with no adverse effects. Introduced lateral step ups to improve LE stability and lateral strength. Balance was challenged with cone taps. Pt needed fingertips to maintain balance. Pt is progressing well through TKA protocol showing full AROM and improved strength. Pt is scheduled to have L TKA on Oct. 17th. Patient will continue to benefit from skilled PT services to modify and progress therapeutic interventions, address functional mobility deficits, address ROM deficits, address strength deficits, analyze and address soft tissue restrictions, analyze and cue movement patterns, analyze and modify body mechanics/ergonomics, assess and modify postural abnormalities, and address imbalance/dizziness to attain remaining goals.      [x]  See Plan of Care  []  See progress note/recertification  []  See Discharge Summary         PLAN  []  Upgrade activities as tolerated     [x]  Continue plan of care  []  Update interventions per flow sheet       []  Discharge due to:_  []  Other:    Mickey José  10/6/2022  11:41 AM

## 2022-10-10 ENCOUNTER — ANESTHESIA EVENT (OUTPATIENT)
Dept: SURGERY | Age: 72
End: 2022-10-10
Payer: MEDICARE

## 2022-10-11 ENCOUNTER — HOSPITAL ENCOUNTER (OUTPATIENT)
Dept: PREADMISSION TESTING | Age: 72
Discharge: HOME | End: 2022-10-11

## 2022-10-11 ENCOUNTER — OFFICE VISIT (OUTPATIENT)
Dept: ORTHOPEDIC SURGERY | Age: 72
End: 2022-10-11
Payer: MEDICARE

## 2022-10-11 DIAGNOSIS — M25.562 LEFT KNEE PAIN, UNSPECIFIED CHRONICITY: Primary | ICD-10-CM

## 2022-10-11 DIAGNOSIS — M17.12 OSTEOARTHRITIS OF LEFT KNEE, UNSPECIFIED OSTEOARTHRITIS TYPE: ICD-10-CM

## 2022-10-11 PROCEDURE — 9100066 BSHSI PB PREOP VISIT: Performed by: ORTHOPAEDIC SURGERY

## 2022-10-11 RX ORDER — CEPHALEXIN 500 MG/1
500 CAPSULE ORAL EVERY 8 HOURS
Qty: 9 CAPSULE | Refills: 0 | Status: SHIPPED | OUTPATIENT
Start: 2022-10-11 | End: 2022-10-14

## 2022-10-11 RX ORDER — HYDROMORPHONE HYDROCHLORIDE 4 MG/1
4 TABLET ORAL
Qty: 30 TABLET | Refills: 0 | Status: SHIPPED | OUTPATIENT
Start: 2022-10-11 | End: 2022-10-21

## 2022-10-11 RX ORDER — ONDANSETRON 4 MG/1
4 TABLET, ORALLY DISINTEGRATING ORAL
Qty: 20 TABLET | Refills: 0 | Status: SHIPPED | OUTPATIENT
Start: 2022-10-11

## 2022-10-11 NOTE — LETTER
10/13/2022    Patient: Deysi Feliz   YOB: 1950   Date of Visit: 10/11/2022     Brenda Kwong MD  [address] [place] [address]  Via Fax: [phone]    Dear Brenda Kwong MD,      Thank you for referring Mr. Tatiana Centeno to [address] [place] [address] for evaluation. My notes for this consultation are attached. If you have questions, please do not hesitate to call me. I look forward to following your patient along with you.       Sincerely,    Magnolia Ramos MD

## 2022-10-11 NOTE — PATIENT INSTRUCTIONS
Knee Arthritis: Care Instructions  Your Care Instructions     Knee arthritis is a breakdown of the cartilage that cushions your knee joint. When the cartilage wears down, your bones rub against each other. This causes pain and stiffness. Knee arthritis tends to get worse with time. Treatment for knee arthritis involves reducing pain, making the leg muscles stronger, and staying at a healthy body weight. The treatment usually does not improve the health of the cartilage, but it can reduce pain and improve how well your knee works. You can take simple measures to protect your knee joints, ease your pain, and help you stay active. Follow-up care is a key part of your treatment and safety. Be sure to make and go to all appointments, and call your doctor if you are having problems. It's also a good idea to know your test results and keep a list of the medicines you take. How can you care for yourself at home? Know that knee arthritis will cause more pain on some days than on others. Stay at a healthy weight. Lose weight if you are overweight. When you stand up, the pressure on your knees from every pound of body weight is multiplied four times. So if you lose 10 pounds, you will reduce the pressure on your knees by 40 pounds. Talk to your doctor or physical therapist about exercises that will help ease joint pain. Stretch to help prevent stiffness and to prevent injury before you exercise. You may enjoy gentle forms of yoga to help keep your knee joints and muscles flexible. Walk instead of jog. Ride a bike. This makes your thigh muscles stronger and takes pressure off your knee. Wear well-fitting and comfortable shoes. Exercise in chest-deep water. This can help you exercise longer with less pain. Avoid exercises that include squatting or kneeling. They can put a lot of strain on your knees. Talk to your doctor to make sure that the exercise you do is not making the arthritis worse.   Do not sit for long periods of time. Try to walk once in a while to keep your knee from getting stiff. Ask your doctor or physical therapist whether shoe inserts may reduce your arthritis pain. If you can afford it, get new athletic shoes at least every year. This can help reduce the strain on your knees. Use a device to help you do everyday activities. A cane or walking stick can help you keep your balance when you walk. Hold the cane or walking stick in the hand opposite the painful knee. If you feel like you may fall when you walk, try using crutches or a front-wheeled walker. These can prevent falls that could cause more damage to your knee. A knee brace may help keep your knee stable and prevent pain. You also can use other things to make life easier, such as a higher toilet seat and handrails in the bathtub or shower. Take pain medicines exactly as directed. Do not wait until you are in severe pain. You will get better results if you take it sooner. If you are not taking a prescription pain medicine, take an over-the-counter medicine such as acetaminophen (Tylenol), ibuprofen (Advil, Motrin), or naproxen (Aleve). Read and follow all instructions on the label. Do not take two or more pain medicines at the same time unless the doctor told you to. Many pain medicines have acetaminophen, which is Tylenol. Too much acetaminophen (Tylenol) can be harmful. Tell your doctor if you take a blood thinner, have diabetes, or have allergies to shellfish. Ask your doctor if you might benefit from a shot of steroid medicine into your knee. This may provide pain relief for several months. Many people take the supplements glucosamine and chondroitin for osteoarthritis. Some people feel they help, but the medical research does not show that they work. Talk to your doctor before you take these supplements. When should you call for help?    Call your doctor now or seek immediate medical care if:    You have sudden swelling, warmth, or pain in your knee. You have knee pain and a fever or rash. You have such bad pain that you cannot use your knee. Watch closely for changes in your health, and be sure to contact your doctor if you have any problems. Where can you learn more? Go to http://www.gray.com/  Enter W187 in the search box to learn more about \"Knee Arthritis: Care Instructions. \"  Current as of: December 20, 2021               Content Version: 13.2  © 2006-2022 [employer]. Care instructions adapted under license by [employer] (which disclaims liability or warranty for this information). If you have questions about a medical condition or this instruction, always ask your healthcare professional. [address] any warranty or liability for your use of this information.

## 2022-10-11 NOTE — PROGRESS NOTES
Name: Isak Crane    : 1950     Service Dept: [address] [place] [address]    Chief Complaint   Patient presents with    Pre-op Exam    Knee Pain        There were no vitals taken for this visit. No Known Allergies     Current Outpatient Medications   Medication Sig Dispense Refill    neomycin-polymyxin-hydrocortisone (CORTISPORIN) otic solution 1 drop into affected eye      triamcinolone acetonide (KENALOG) 0.1 % topical cream APPLY A THIN LAYER TO THE AFFECTED AREA(S) BY TOPICAL ROUTE 2 TIMES PER DAY FOR 7-10 DAYS 30 g 2    calcium carbonate (OS-CHARLOTTE) 500 mg calcium (1,250 mg) tablet 2 tablet      ondansetron (ZOFRAN ODT) 4 mg disintegrating tablet Take 1 Tablet by mouth every eight (8) hours as needed for Nausea, Vomiting or Nausea or Vomiting for up to 20 doses. 20 Tablet 0    apixaban (ELIQUIS) 5 mg tablet Take 5 mg by mouth two (2) times a day. acetaminophen 325 mg cap Tylenol 325 mg capsule   PRN      pantoprazole (PROTONIX) 40 mg tablet       finasteride (PROSCAR) 5 mg tablet TAKE 1 TABLET DAILY 90 Tab 3    carvedilol (COREG) 6.25 mg tablet Take  by mouth two (2) times daily (with meals). docusate sodium (COLACE) 100 mg capsule Take 100 mg by mouth. cholecalciferol (VITAMIN D3) 25 mcg (1,000 unit) cap Take 5,000 Units by mouth daily. ferrous sulfate 325 mg (65 mg iron) tablet Take  by mouth Daily (before breakfast). dilTIAZem CD (CARDIZEM CD) 240 mg ER capsule         Patient Active Problem List   Diagnosis Code    Prostate nodule N40.2    BPH with obstruction/lower urinary tract symptoms N40.1, N13.8    Recurrent UTI N39.0    OA (osteoarthritis) of knee M17.9    Post-operative complication N10. 9XXA    Chronic atrial fibrillation (HCC) I48.20    Chronic a-fib (HCC) I48.20    Cellulitis of right leg L03.115      History reviewed. No pertinent family history.    Social History     Socioeconomic History    Marital status:    Tobacco Use    Smoking status: Never    Smokeless tobacco: Never   Vaping Use    Vaping Use: Never used   Substance and Sexual Activity    Alcohol use: No      Past Surgical History:   Procedure Laterality Date    HX PARATHYROIDECTOMY        Past Medical History:   Diagnosis Date    Chronic atrial fibrillation (HCC)     Chronic diarrhea     HLD (hyperlipidemia)     HTN (hypertension)     Hyperparathyroidism (HCC)     Impaired glucose tolerance     Obesity     Pleural effusion         I have reviewed and agree with PFSH and ROS and intake form in chart and the record furthermore I have reviewed prior medical record(s) regarding this patients care during this appointment. Review of Systems:   Patient is a pleasant appearing individual, appropriately dressed, well hydrated, well nourished, who is alert, appropriately oriented for age, and in no acute distress with a normal gait and normal affect who does not appear to be in any significant pain. Physical Exam:  Left Knee -Decrease range of motion with flexion, Knee arc of greater than 50 degrees, Some crepitation, Grossly neurovascularly intact, Good cap refill, No skin lesion, Moderate swelling, some gross instability, Some quadriceps weakness, Kellgren and Harsh at least grade 3    Right Knee - Full Range of Motion, No crepitation, Grossly neurovascularly intact, Good cap refill, No skin lesion, No swelling, No gross instability, No quadriceps weakness     Inpatient status: The patient has admitted to severe pain in the affected knee and due to such pain they are unable to complete activities of daily living at home and/or work on a regular basis where conservative treatments have failed. After extensive discussion with the patient, they have chosen to receive a total knee replacement with the expectation of inpatient procedure.  Their dependent functional status (i.e. lack of capable support and safety at home, pain management, comorbities, or difficulty ambulating with assistive walking devices) would deem them a candidate for an inpatient stay. The patient acknowledges and understand the plan. The risks of surgery were explained to the patient which include but not limited to infection, nerve injury, artery injury, tendon injury, poor result, poor wound healing, unforeseen incidence, bleeding, infection, nerve damage, failure to improve, worsening of symptoms, morbidity, and mortality risks were explained. All questions were answered. Patient was told of no guarantees. Patient accepts all risks and benefits. A consent for surgery will be documented and signed by the patient or a legal guardian. All questions were answered. The procedure was explained in detail. The patient was counseled about the risks of ike Covid-19 during their perioperative period and any recovery window from their procedure. The patient was made aware that ike Covid-19 may worsen their prognosis for recovering from their procedure and lend to a higher morbidity and/or mortality risk. All material risks, benefits, and reasonable alternatives including postponing the procedure were discussed. The patient DOES wish to proceed with their procedure at this time. Encounter Diagnoses     ICD-10-CM ICD-9-CM   1. Left knee pain, unspecified chronicity  M25.562 719.46   2. Osteoarthritis of left knee, unspecified osteoarthritis type  M17.12 715.96       HPI:  The patient is here with a chief complaint of left knee pain, dull throbbing pain. Pain is same. Pain is 4/10. X-rays are positive for OA. Assessment/Plan:  Plan will be for left total knee replacement. He is on Eliquis. He will stop that at least 4 to 5 days before. Eliquis postoperatively. We will also Dilaudid, Keflex and Zofran. He may be a potential Cellerate, as well and we will match it up to the opposite side for his left knee.     As part of continued conservative pain management options the patient was advised to utilize Tylenol or OTC NSAIDS as long as it is not medically contraindicated. Return to Office: Follow-up and Dispositions    Return for already scheduled for surgery. Scribed by Sol Zarate LPN as dictated by [facility] JOCELYNE Olsen MD.  Documentation True and Accepted Misha Olsen MD

## 2022-10-11 NOTE — H&P (VIEW-ONLY)
Name: Daphney Blanco    : 1950     Service Dept: [address] [facility]    Chief Complaint   Patient presents with    Pre-op Exam    Knee Pain        There were no vitals taken for this visit. No Known Allergies     Current Outpatient Medications   Medication Sig Dispense Refill    neomycin-polymyxin-hydrocortisone (CORTISPORIN) otic solution 1 drop into affected eye      triamcinolone acetonide (KENALOG) 0.1 % topical cream APPLY A THIN LAYER TO THE AFFECTED AREA(S) BY TOPICAL ROUTE 2 TIMES PER DAY FOR 7-10 DAYS 30 g 2    calcium carbonate (OS-CHARLOTTE) 500 mg calcium (1,250 mg) tablet 2 tablet      ondansetron (ZOFRAN ODT) 4 mg disintegrating tablet Take 1 Tablet by mouth every eight (8) hours as needed for Nausea, Vomiting or Nausea or Vomiting for up to 20 doses. 20 Tablet 0    apixaban (ELIQUIS) 5 mg tablet Take 5 mg by mouth two (2) times a day. acetaminophen 325 mg cap Tylenol 325 mg capsule   PRN      pantoprazole (PROTONIX) 40 mg tablet       finasteride (PROSCAR) 5 mg tablet TAKE 1 TABLET DAILY 90 Tab 3    carvedilol (COREG) 6.25 mg tablet Take  by mouth two (2) times daily (with meals). docusate sodium (COLACE) 100 mg capsule Take 100 mg by mouth. cholecalciferol (VITAMIN D3) 25 mcg (1,000 unit) cap Take 5,000 Units by mouth daily. ferrous sulfate 325 mg (65 mg iron) tablet Take  by mouth Daily (before breakfast). dilTIAZem CD (CARDIZEM CD) 240 mg ER capsule         Patient Active Problem List   Diagnosis Code    Prostate nodule N40.2    BPH with obstruction/lower urinary tract symptoms N40.1, N13.8    Recurrent UTI N39.0    OA (osteoarthritis) of knee M17.9    Post-operative complication B92. 9XXA    Chronic atrial fibrillation (HCC) I48.20    Chronic a-fib (HCC) I48.20    Cellulitis of right leg L03.115      History reviewed. No pertinent family history.    Social History     Socioeconomic History    Marital status:    Tobacco Use    Smoking status: Never    Smokeless tobacco: Never   Vaping Use    Vaping Use: Never used   Substance and Sexual Activity    Alcohol use: No      Past Surgical History:   Procedure Laterality Date    HX PARATHYROIDECTOMY        Past Medical History:   Diagnosis Date    Chronic atrial fibrillation (HCC)     Chronic diarrhea     HLD (hyperlipidemia)     HTN (hypertension)     Hyperparathyroidism (HCC)     Impaired glucose tolerance     Obesity     Pleural effusion         I have reviewed and agree with PFSH and ROS and intake form in chart and the record furthermore I have reviewed prior medical record(s) regarding this patients care during this appointment. Review of Systems:   Patient is a pleasant appearing individual, appropriately dressed, well hydrated, well nourished, who is alert, appropriately oriented for age, and in no acute distress with a normal gait and normal affect who does not appear to be in any significant pain. Physical Exam:  Left Knee -Decrease range of motion with flexion, Knee arc of greater than 50 degrees, Some crepitation, Grossly neurovascularly intact, Good cap refill, No skin lesion, Moderate swelling, some gross instability, Some quadriceps weakness, Kellgren and Harsh at least grade 3    Right Knee - Full Range of Motion, No crepitation, Grossly neurovascularly intact, Good cap refill, No skin lesion, No swelling, No gross instability, No quadriceps weakness     Inpatient status: The patient has admitted to severe pain in the affected knee and due to such pain they are unable to complete activities of daily living at home and/or work on a regular basis where conservative treatments have failed. After extensive discussion with the patient, they have chosen to receive a total knee replacement with the expectation of inpatient procedure.  Their dependent functional status (i.e. lack of capable support and safety at home, pain management, comorbities, or difficulty ambulating with assistive walking devices) would deem them a candidate for an inpatient stay. The patient acknowledges and understand the plan. The risks of surgery were explained to the patient which include but not limited to infection, nerve injury, artery injury, tendon injury, poor result, poor wound healing, unforeseen incidence, bleeding, infection, nerve damage, failure to improve, worsening of symptoms, morbidity, and mortality risks were explained. All questions were answered. Patient was told of no guarantees. Patient accepts all risks and benefits. A consent for surgery will be documented and signed by the patient or a legal guardian. All questions were answered. The procedure was explained in detail. The patient was counseled about the risks of ike Covid-19 during their perioperative period and any recovery window from their procedure. The patient was made aware that ike Covid-19 may worsen their prognosis for recovering from their procedure and lend to a higher morbidity and/or mortality risk. All material risks, benefits, and reasonable alternatives including postponing the procedure were discussed. The patient DOES wish to proceed with their procedure at this time. Encounter Diagnoses     ICD-10-CM ICD-9-CM   1. Left knee pain, unspecified chronicity  M25.562 719.46   2. Osteoarthritis of left knee, unspecified osteoarthritis type  M17.12 715.96       HPI:  The patient is here with a chief complaint of left knee pain, dull throbbing pain. Pain is same. Pain is 4/10. X-rays are positive for OA. Assessment/Plan:  Plan will be for left total knee replacement. He is on Eliquis. He will stop that at least 4 to 5 days before. Eliquis postoperatively. We will also Dilaudid, Keflex and Zofran. He may be a potential Cellerate, as well and we will match it up to the opposite side for his left knee.     As part of continued conservative pain management options the patient was advised to utilize Tylenol or OTC NSAIDS as long as it is not medically contraindicated. Return to Office: Follow-up and Dispositions    Return for already scheduled for surgery. Scribed by Bruno Summers LPN as dictated by [facility] [place] JOCELYNE Woo MD.  Documentation True and Accepted Misha Woo MD

## 2022-10-17 ENCOUNTER — ANESTHESIA (OUTPATIENT)
Dept: SURGERY | Age: 72
End: 2022-10-17
Payer: MEDICARE

## 2022-10-17 ENCOUNTER — APPOINTMENT (OUTPATIENT)
Dept: GENERAL RADIOLOGY | Age: 72
End: 2022-10-17
Attending: NURSE PRACTITIONER
Payer: MEDICARE

## 2022-10-17 ENCOUNTER — HOSPITAL ENCOUNTER (OUTPATIENT)
Age: 72
Discharge: HOME | End: 2022-10-17
Attending: ORTHOPAEDIC SURGERY | Admitting: ORTHOPAEDIC SURGERY
Payer: MEDICARE

## 2022-10-17 VITALS
DIASTOLIC BLOOD PRESSURE: 85 MMHG | OXYGEN SATURATION: 98 % | TEMPERATURE: 97.1 F | HEART RATE: 92 BPM | SYSTOLIC BLOOD PRESSURE: 138 MMHG | HEIGHT: 76 IN | WEIGHT: 296.4 LBS | RESPIRATION RATE: 14 BRPM | BODY MASS INDEX: 36.09 KG/M2

## 2022-10-17 PROCEDURE — 73560 X-RAY EXAM OF KNEE 1 OR 2: CPT

## 2022-10-17 PROCEDURE — 77030029372 HC ADH SKN CLSR PRINEO J&J -C: Performed by: ORTHOPAEDIC SURGERY

## 2022-10-17 PROCEDURE — C1776 JOINT DEVICE (IMPLANTABLE): HCPCS | Performed by: ORTHOPAEDIC SURGERY

## 2022-10-17 PROCEDURE — 77030040393 HC DRSG OPTIFOAM GENT MDII -B: Performed by: ORTHOPAEDIC SURGERY

## 2022-10-17 PROCEDURE — 77030018673: Performed by: ORTHOPAEDIC SURGERY

## 2022-10-17 PROCEDURE — 76210000025 HC REC RM PH II 3 TO 3.5 HR: Performed by: ORTHOPAEDIC SURGERY

## 2022-10-17 PROCEDURE — 76210000063 HC OR PH I REC FIRST 0.5 HR: Performed by: ORTHOPAEDIC SURGERY

## 2022-10-17 PROCEDURE — 74011250637 HC RX REV CODE- 250/637: Performed by: NURSE ANESTHETIST, CERTIFIED REGISTERED

## 2022-10-17 PROCEDURE — 76060000035 HC ANESTHESIA 2 TO 2.5 HR: Performed by: ORTHOPAEDIC SURGERY

## 2022-10-17 PROCEDURE — 77030013708 HC HNDPC SUC IRR PULS STRY –B: Performed by: ORTHOPAEDIC SURGERY

## 2022-10-17 PROCEDURE — 74011250637 HC RX REV CODE- 250/637: Performed by: NURSE PRACTITIONER

## 2022-10-17 PROCEDURE — 77030031139 HC SUT VCRL2 J&J -A: Performed by: ORTHOPAEDIC SURGERY

## 2022-10-17 PROCEDURE — 74011000250 HC RX REV CODE- 250: Performed by: ORTHOPAEDIC SURGERY

## 2022-10-17 PROCEDURE — 74011000250 HC RX REV CODE- 250: Performed by: NURSE ANESTHETIST, CERTIFIED REGISTERED

## 2022-10-17 PROCEDURE — 77030040361 HC SLV COMPR DVT MDII -B: Performed by: ORTHOPAEDIC SURGERY

## 2022-10-17 PROCEDURE — 77030039147 HC PWDR HEMSTS SURGICEL JNJ -D: Performed by: ORTHOPAEDIC SURGERY

## 2022-10-17 PROCEDURE — 77030011266 HC ELECTRD BLD INSL COVD -A: Performed by: ORTHOPAEDIC SURGERY

## 2022-10-17 PROCEDURE — 77030041690 HC SYS PINNING KN JNJ -D: Performed by: ORTHOPAEDIC SURGERY

## 2022-10-17 PROCEDURE — 64450 NJX AA&/STRD OTHER PN/BRANCH: CPT | Performed by: NURSE ANESTHETIST, CERTIFIED REGISTERED

## 2022-10-17 PROCEDURE — 77030007866 HC KT SPN ANES BBMI -B: Performed by: NURSE ANESTHETIST, CERTIFIED REGISTERED

## 2022-10-17 PROCEDURE — 74011250636 HC RX REV CODE- 250/636: Performed by: NURSE ANESTHETIST, CERTIFIED REGISTERED

## 2022-10-17 PROCEDURE — C1713 ANCHOR/SCREW BN/BN,TIS/BN: HCPCS | Performed by: ORTHOPAEDIC SURGERY

## 2022-10-17 PROCEDURE — 64447 NJX AA&/STRD FEMORAL NRV IMG: CPT | Performed by: NURSE ANESTHETIST, CERTIFIED REGISTERED

## 2022-10-17 PROCEDURE — 76942 ECHO GUIDE FOR BIOPSY: CPT | Performed by: NURSE ANESTHETIST, CERTIFIED REGISTERED

## 2022-10-17 PROCEDURE — 74011000272 HC RX REV CODE- 272: Performed by: ORTHOPAEDIC SURGERY

## 2022-10-17 PROCEDURE — 77030031140 HC SUT VCRL3 J&J -A: Performed by: ORTHOPAEDIC SURGERY

## 2022-10-17 PROCEDURE — 77030013079 HC BLNKT BAIR HGGR 3M -A: Performed by: NURSE ANESTHETIST, CERTIFIED REGISTERED

## 2022-10-17 PROCEDURE — 97161 PT EVAL LOW COMPLEX 20 MIN: CPT

## 2022-10-17 PROCEDURE — 77030006812 HC BLD SAW RECIP STRY -B: Performed by: ORTHOPAEDIC SURGERY

## 2022-10-17 PROCEDURE — 77030000032 HC CUF TRNQT ZIMM -B: Performed by: ORTHOPAEDIC SURGERY

## 2022-10-17 PROCEDURE — 77030038692 HC WND DEB SYS IRMX -B: Performed by: ORTHOPAEDIC SURGERY

## 2022-10-17 PROCEDURE — 2709999900 HC NON-CHARGEABLE SUPPLY: Performed by: ORTHOPAEDIC SURGERY

## 2022-10-17 PROCEDURE — 76010000131 HC OR TIME 2 TO 2.5 HR: Performed by: ORTHOPAEDIC SURGERY

## 2022-10-17 PROCEDURE — 77030006835 HC BLD SAW SAG STRY -B: Performed by: ORTHOPAEDIC SURGERY

## 2022-10-17 DEVICE — ATTUNE KNEE SYSTEM FEMORAL POSTERIOR STABILIZED SIZE 8 LEFT CEMENTED
Type: IMPLANTABLE DEVICE | Site: KNEE | Status: FUNCTIONAL
Brand: ATTUNE

## 2022-10-17 DEVICE — CEMENT BNE GENTAMC HV R+G 40GM -- PALACOS R+G 5036964: Type: IMPLANTABLE DEVICE | Site: KNEE | Status: FUNCTIONAL

## 2022-10-17 DEVICE — ATTUNE PATELLA MEDIALIZED DOME 41MM CEMENTED AOX
Type: IMPLANTABLE DEVICE | Site: KNEE | Status: FUNCTIONAL
Brand: ATTUNE

## 2022-10-17 DEVICE — ATTUNE KNEE SYSTEM REVISION ROTATING PLATFORM TIBIAL BASE CEMENTED SIZE 8
Type: IMPLANTABLE DEVICE | Site: KNEE | Status: FUNCTIONAL
Brand: ATTUNE

## 2022-10-17 DEVICE — ATTUNE KNEE SYSTEM TIBIAL INSERT ROTATING PLATFORM POSTERIOR STABILIZED 8 5MM AOX
Type: IMPLANTABLE DEVICE | Site: KNEE | Status: FUNCTIONAL
Brand: ATTUNE

## 2022-10-17 DEVICE — KNEE K1 TOT HEMI STD CEM IMPL CAPPED SYNTHES: Type: IMPLANTABLE DEVICE | Site: KNEE | Status: FUNCTIONAL

## 2022-10-17 RX ORDER — SODIUM CHLORIDE 0.9 % (FLUSH) 0.9 %
5-40 SYRINGE (ML) INJECTION EVERY 8 HOURS
Status: CANCELLED | OUTPATIENT
Start: 2022-10-17

## 2022-10-17 RX ORDER — TRANEXAMIC ACID 100 MG/ML
INJECTION, SOLUTION INTRAVENOUS AS NEEDED
Status: DISCONTINUED | OUTPATIENT
Start: 2022-10-17 | End: 2022-10-17 | Stop reason: HOSPADM

## 2022-10-17 RX ORDER — MIDAZOLAM HYDROCHLORIDE 1 MG/ML
INJECTION, SOLUTION INTRAMUSCULAR; INTRAVENOUS
Status: SHIPPED | OUTPATIENT
Start: 2022-10-17 | End: 2022-10-17

## 2022-10-17 RX ORDER — GABAPENTIN 300 MG/1
300 CAPSULE ORAL ONCE
Status: COMPLETED | OUTPATIENT
Start: 2022-10-17 | End: 2022-10-17

## 2022-10-17 RX ORDER — BUPIVACAINE HYDROCHLORIDE 2.5 MG/ML
INJECTION, SOLUTION EPIDURAL; INFILTRATION; INTRACAUDAL AS NEEDED
Status: DISCONTINUED | OUTPATIENT
Start: 2022-10-17 | End: 2022-10-17 | Stop reason: HOSPADM

## 2022-10-17 RX ORDER — CEFAZOLIN SODIUM 1 G/3ML
INJECTION, POWDER, FOR SOLUTION INTRAMUSCULAR; INTRAVENOUS AS NEEDED
Status: DISCONTINUED | OUTPATIENT
Start: 2022-10-17 | End: 2022-10-17 | Stop reason: HOSPADM

## 2022-10-17 RX ORDER — OXYCODONE AND ACETAMINOPHEN 5; 325 MG/1; MG/1
2 TABLET ORAL
Status: DISCONTINUED | OUTPATIENT
Start: 2022-10-17 | End: 2022-10-17 | Stop reason: HOSPADM

## 2022-10-17 RX ORDER — FACIAL-BODY WIPES
10 EACH TOPICAL DAILY PRN
Status: CANCELLED | OUTPATIENT
Start: 2022-10-17

## 2022-10-17 RX ORDER — BUPIVACAINE HYDROCHLORIDE 5 MG/ML
INJECTION, SOLUTION EPIDURAL; INTRACAUDAL
Status: SHIPPED | OUTPATIENT
Start: 2022-10-17 | End: 2022-10-17

## 2022-10-17 RX ORDER — PROPOFOL 10 MG/ML
INJECTION, EMULSION INTRAVENOUS
Status: DISCONTINUED | OUTPATIENT
Start: 2022-10-17 | End: 2022-10-17 | Stop reason: HOSPADM

## 2022-10-17 RX ORDER — ONDANSETRON 2 MG/ML
4 INJECTION INTRAMUSCULAR; INTRAVENOUS
Status: DISCONTINUED | OUTPATIENT
Start: 2022-10-17 | End: 2022-10-17 | Stop reason: HOSPADM

## 2022-10-17 RX ORDER — SENNOSIDES 8.6 MG/1
1 TABLET ORAL 2 TIMES DAILY
Status: CANCELLED | OUTPATIENT
Start: 2022-10-17

## 2022-10-17 RX ORDER — SODIUM CHLORIDE 0.9 % (FLUSH) 0.9 %
5-40 SYRINGE (ML) INJECTION AS NEEDED
Status: CANCELLED | OUTPATIENT
Start: 2022-10-17

## 2022-10-17 RX ORDER — DEXAMETHASONE SODIUM PHOSPHATE 4 MG/ML
INJECTION, SOLUTION INTRA-ARTICULAR; INTRALESIONAL; INTRAMUSCULAR; INTRAVENOUS; SOFT TISSUE
Status: SHIPPED | OUTPATIENT
Start: 2022-10-17 | End: 2022-10-17

## 2022-10-17 RX ORDER — SODIUM CHLORIDE 0.9 % (FLUSH) 0.9 %
5-40 SYRINGE (ML) INJECTION AS NEEDED
Status: DISCONTINUED | OUTPATIENT
Start: 2022-10-17 | End: 2022-10-17 | Stop reason: HOSPADM

## 2022-10-17 RX ORDER — ONDANSETRON 2 MG/ML
4 INJECTION INTRAMUSCULAR; INTRAVENOUS ONCE
Status: DISCONTINUED | OUTPATIENT
Start: 2022-10-17 | End: 2022-10-17 | Stop reason: HOSPADM

## 2022-10-17 RX ORDER — HYDROMORPHONE HYDROCHLORIDE 2 MG/1
2 TABLET ORAL
Status: COMPLETED | OUTPATIENT
Start: 2022-10-17 | End: 2022-10-17

## 2022-10-17 RX ORDER — SODIUM CHLORIDE 0.9 % (FLUSH) 0.9 %
5-40 SYRINGE (ML) INJECTION EVERY 8 HOURS
Status: DISCONTINUED | OUTPATIENT
Start: 2022-10-17 | End: 2022-10-17 | Stop reason: HOSPADM

## 2022-10-17 RX ORDER — LIDOCAINE HYDROCHLORIDE 10 MG/ML
INJECTION, SOLUTION EPIDURAL; INFILTRATION; INTRACAUDAL; PERINEURAL
Status: SHIPPED | OUTPATIENT
Start: 2022-10-17 | End: 2022-10-17

## 2022-10-17 RX ORDER — FENTANYL CITRATE 50 UG/ML
50 INJECTION, SOLUTION INTRAMUSCULAR; INTRAVENOUS AS NEEDED
Status: DISCONTINUED | OUTPATIENT
Start: 2022-10-17 | End: 2022-10-17 | Stop reason: HOSPADM

## 2022-10-17 RX ORDER — ACETAMINOPHEN 325 MG/1
650 TABLET ORAL
Status: CANCELLED | OUTPATIENT
Start: 2022-10-17

## 2022-10-17 RX ORDER — OXYCODONE AND ACETAMINOPHEN 10; 325 MG/1; MG/1
1 TABLET ORAL
Status: DISCONTINUED | OUTPATIENT
Start: 2022-10-17 | End: 2022-10-17 | Stop reason: HOSPADM

## 2022-10-17 RX ORDER — BUPIVACAINE HYDROCHLORIDE 7.5 MG/ML
INJECTION, SOLUTION INTRASPINAL
Status: SHIPPED | OUTPATIENT
Start: 2022-10-17 | End: 2022-10-17

## 2022-10-17 RX ORDER — DIPHENHYDRAMINE HYDROCHLORIDE 50 MG/ML
12.5 INJECTION, SOLUTION INTRAMUSCULAR; INTRAVENOUS
Status: CANCELLED | OUTPATIENT
Start: 2022-10-17

## 2022-10-17 RX ORDER — ACETAMINOPHEN 500 MG
1000 TABLET ORAL ONCE
Status: COMPLETED | OUTPATIENT
Start: 2022-10-17 | End: 2022-10-17

## 2022-10-17 RX ORDER — SODIUM CHLORIDE, SODIUM LACTATE, POTASSIUM CHLORIDE, CALCIUM CHLORIDE 600; 310; 30; 20 MG/100ML; MG/100ML; MG/100ML; MG/100ML
INJECTION, SOLUTION INTRAVENOUS
Status: DISCONTINUED | OUTPATIENT
Start: 2022-10-17 | End: 2022-10-17 | Stop reason: HOSPADM

## 2022-10-17 RX ORDER — EPHEDRINE SULFATE/0.9% NACL/PF 50 MG/5 ML
SYRINGE (ML) INTRAVENOUS AS NEEDED
Status: DISCONTINUED | OUTPATIENT
Start: 2022-10-17 | End: 2022-10-17 | Stop reason: HOSPADM

## 2022-10-17 RX ORDER — NALOXONE HYDROCHLORIDE 0.4 MG/ML
0.4 INJECTION, SOLUTION INTRAMUSCULAR; INTRAVENOUS; SUBCUTANEOUS AS NEEDED
Status: CANCELLED | OUTPATIENT
Start: 2022-10-17

## 2022-10-17 RX ORDER — SODIUM CHLORIDE, SODIUM LACTATE, POTASSIUM CHLORIDE, CALCIUM CHLORIDE 600; 310; 30; 20 MG/100ML; MG/100ML; MG/100ML; MG/100ML
25 INJECTION, SOLUTION INTRAVENOUS CONTINUOUS
Status: DISCONTINUED | OUTPATIENT
Start: 2022-10-17 | End: 2022-10-17 | Stop reason: HOSPADM

## 2022-10-17 RX ADMIN — SODIUM CHLORIDE, POTASSIUM CHLORIDE, SODIUM LACTATE AND CALCIUM CHLORIDE 25 ML/HR: 600; 310; 30; 20 INJECTION, SOLUTION INTRAVENOUS at 07:00

## 2022-10-17 RX ADMIN — LIDOCAINE HYDROCHLORIDE 30 MG: 10 INJECTION, SOLUTION EPIDURAL; INFILTRATION; INTRACAUDAL; PERINEURAL at 09:12

## 2022-10-17 RX ADMIN — MIDAZOLAM HYDROCHLORIDE 4 MG: 1 INJECTION, SOLUTION INTRAMUSCULAR; INTRAVENOUS at 09:12

## 2022-10-17 RX ADMIN — ACETAMINOPHEN 1000 MG: 500 TABLET ORAL at 06:59

## 2022-10-17 RX ADMIN — BUPIVACAINE HYDROCHLORIDE 20 ML: 5 INJECTION, SOLUTION EPIDURAL; INTRACAUDAL; PERINEURAL at 08:24

## 2022-10-17 RX ADMIN — PROPOFOL 35 MCG/KG/MIN: 10 INJECTION, EMULSION INTRAVENOUS at 09:20

## 2022-10-17 RX ADMIN — DEXAMETHASONE SODIUM PHOSPHATE 4 MG: 4 INJECTION, SOLUTION INTRAMUSCULAR; INTRAVENOUS at 08:24

## 2022-10-17 RX ADMIN — MIDAZOLAM HYDROCHLORIDE 4 MG: 2 INJECTION, SOLUTION INTRAMUSCULAR; INTRAVENOUS at 08:24

## 2022-10-17 RX ADMIN — Medication 12.5 MG: at 09:37

## 2022-10-17 RX ADMIN — HYDROMORPHONE HYDROCHLORIDE 2 MG: 2 TABLET ORAL at 14:52

## 2022-10-17 RX ADMIN — CEFAZOLIN SODIUM 2 G: 1 INJECTION, POWDER, FOR SOLUTION INTRAMUSCULAR; INTRAVENOUS at 09:20

## 2022-10-17 RX ADMIN — TRANEXAMIC ACID 1 G: 1 INJECTION, SOLUTION INTRAVENOUS at 09:20

## 2022-10-17 RX ADMIN — SODIUM CHLORIDE, POTASSIUM CHLORIDE, SODIUM LACTATE AND CALCIUM CHLORIDE: 600; 310; 30; 20 INJECTION, SOLUTION INTRAVENOUS at 09:02

## 2022-10-17 RX ADMIN — GABAPENTIN 300 MG: 300 CAPSULE ORAL at 06:59

## 2022-10-17 RX ADMIN — BUPIVACAINE HYDROCHLORIDE WITH DEXTROSE 15 MG: 7.5 INJECTION SUBARACHNOID at 09:12

## 2022-10-17 NOTE — ANESTHESIA POSTPROCEDURE EVALUATION
Procedure(s):  LEFT TKA. general - backup, regional, spinal    Anesthesia Post Evaluation      Multimodal analgesia: multimodal analgesia used between 6 hours prior to anesthesia start to PACU discharge  Patient location during evaluation: bedside  Patient participation: complete - patient participated  Level of consciousness: awake and alert  Pain score: 0  Pain management: adequate  Airway patency: patent  Anesthetic complications: no  Cardiovascular status: acceptable and stable  Respiratory status: acceptable and room air  Hydration status: acceptable  Comments: Ok to discharge when post op criteria met.    Post anesthesia nausea and vomiting:  none  Final Post Anesthesia Temperature Assessment:  Normothermia (36.0-37.5 degrees C)      INITIAL Post-op Vital signs:   Vitals Value Taken Time   BP 96/66 10/17/22 1106   Temp     Pulse 75 10/17/22 1106   Resp 16 10/17/22 1106   SpO2 93 % 10/17/22 1106

## 2022-10-17 NOTE — PROGRESS NOTES
Problem: Patient Education: Go to Patient Education Activity  Goal: Patient/Family Education  Outcome: Resolved/Met     Problem: Mobility Impaired (Adult and Pediatric)  Goal: *Acute Goals and Plan of Care (Insert Text)  Description: Pt ambulates with MOD (I) and navigates stairs with MOD (I) . PLOF: Community ambulator, no AD, (I) ADLs    Outcome: Resolved/Met   PHYSICAL THERAPY EVALUATION AND DISCHARGE    Patient: Geoffrey Madison (25 y.o. male)  Date: 10/17/2022   Start Time: 1406   Stop Time: 1427  $$ Initial PT Evaluation: Low Complex 21 Min                Primary Diagnosis: Osteoarthritis of left knee, unspecified osteoarthritis type [M17.12]  OA (osteoarthritis) of knee [M17.9]  Procedure(s) (LRB):  LEFT TKA (STEMMABLE) (Left) Day of Surgery   Precautions:  WBAT    ASSESSMENT :  Based on the objective data described below, the patient presents s/p L TKA and he is WBAT. He is able to ambulate with a RW with MOD (I) and he is able to navigate stairs with MOD (I). He is educated on a HEP and tolerates well. He can return home with outpatient P.T. Patient does not require further skilled intervention at this level of care. PLAN :  Recommendations and Planned Interventions:   No formal PT needs identified at this time. Discharge Recommendations: Outpatient  AM-PAC: 24/24  Further Equipment Recommendations for Discharge: N/A     SUBJECTIVE:   Patient states my R foot is a little numb.     OBJECTIVE DATA SUMMARY:     Past Medical History:   Diagnosis Date    Chronic atrial fibrillation (HCC)     Chronic diarrhea     HLD (hyperlipidemia)     HTN (hypertension)     Hyperparathyroidism (HCC)     Impaired glucose tolerance     Obesity     Pleural effusion      Past Surgical History:   Procedure Laterality Date    HX PARATHYROIDECTOMY       Barriers to Learning/Limitations: None  Compensate with: N/A  Home Situation:   Home Situation  Home Environment: Private residence  # Steps to Enter: 5  Rails to Enter: Yes  Hand Rails : Bilateral  One/Two Story Residence: Two story  # of Interior Steps: 15  Interior Rails: Both  Living Alone: Yes  Support Systems: Other Family Member(s)  Patient Expects to be Discharged to[de-identified] Home with outpatient services  Current DME Used/Available at Home: Walker, rolling, Cane, straight  Critical Behavior:  Neurologic State: Alert  Orientation Level: Oriented X4     Skin Integrity: Incision (comment)  Skin Integumentary  Skin Integrity: Incision (comment)     Strength:    Strength: Generally decreased, functional (L knee 4/5, SLR 1320, 4 hours after spinal)  Tone & Sensation:   Tone: Normal     Sensation: Impaired (R foot tingling, pelvic area numb)  Coordination:  Coordination: Within functional limits  Range Of Motion:   AROM: Generally decreased, functional (L knee 13-95)  PROM: Generally decreased, functional (L knee )  Posture:  Posture (WDL): Within defined limits     Functional Mobility:  Bed Mobility:  Rolling: Modified independent  Supine to Sit: Modified independent  Sit to Supine: Modified independent  Scooting: Modified independent  Transfers:  Sit to Stand: Modified independent  Stand to Sit: Modified independent  Balance:   Sitting: Intact  Standing: Intact  Ambulation/Gait Training:  Distance (ft): 180 Feet (ft)  Assistive Device: Walker, rolling  Ambulation - Level of Assistance: Modified independent     Gait Description (WDL): Exceptions to WDL  Left Side Weight Bearing: As tolerated  Stairs:  Number of Stairs Trained: 5 (nonreciprocating)  Stairs - Level of Assistance: Modified independent  Rail Use: Both      AM-PAC:  24/24; Current research shows that an AM-PAC score of 17 or less is typically not associated with a discharge to the patient's home setting, whereas a score of 18 or greater is typically associated with a discharge to the patient's home setting.     Pain:  Pain level pre-treatment: 2/10   Pain level post-treatment: 2/10  Pain Location: L knee  Pain Intervention(s): Medication (see MAR); Rest, Ice, Repositioning   Response to intervention: Nurse notified, See doc flow    Activity Tolerance:   Good  Please refer to the flowsheet for vital signs taken during this treatment. After treatment:   []         Patient left in no apparent distress sitting up in chair  [x]         Patient left in no apparent distress in bed  []         Call bell left within reach  [x]         Nursing notified  []         Caregiver present  []         Bed alarm activated  []         SCDs applied    COMMUNICATION/EDUCATION:   [x]         Role of Physical Therapy in the acute care setting. [x]         Fall prevention education was provided and the patient/caregiver indicated understanding. [x]         Patient/family have participated as able in goal setting and plan of care. [x]         Patient/family agree to work toward stated goals and plan of care. []         Patient understands intent and goals of therapy, but is neutral about his/her participation. []         Patient is unable to participate in goal setting/plan of care: ongoing with therapy staff.  []         Other:     Thank you for this referral.  Yohan Madison, PT, DPT   Time Calculation: 21 mins

## 2022-10-17 NOTE — OP NOTES
Operative Note    Patient: Clayton Biggs MRN: [id number]  Surgery Date: 10/17/2022  [unfilled]          Procedure  Primary Surgeon    LEFT TKA  Claudene Mixer, MD    * Panel 2 does not exist *  * Panel 2 does not exist *    * Panel 3 does not exist *  * Panel 3 does not exist *     Surgeon(s) and Role:     * Claudene Mixer, MD - Primary    Other OR Staff/Assistants:  Circ-1: Isabella Ping Tech-1: Juan Salines Blackwood  Scrub Tech-2: Lieutenant Mayans  Surg Asst-1: Chas Leonard    1st Assistant Tasks:  Closing    Pre-operative Diagnosis:  Osteoarthritis of left knee, unspecified osteoarthritis type [M17.12]    Post-operative Diagnosis: same as preop diagnosis    Anesthesia Type: Spinal     Findings: djd    Complications: No    EBL: 50 cc    Body mass index is 36.08 kg/m².     Specimens: None    Implants       Type Not Specified    Cement [facility] Hv R+G 40gm -- [place] R+G [id number] - [id number] - Implanted   (Right) Knee      Inventory item: CEMENT [facility] HV R+G 40GM -- [place] R+G [id number] Model/Cat number: [id number]    : [employer] Lot number: [id number]      As of 8/17/2022       Status: Implanted                      Component Fem Sz 8 R Knee Post Stbl Dg Attune - [id number] - Implanted   (Right) Knee      Inventory item: COMPONENT FEM SZ 8 R KNEE POST STBL DG ATTUNE Model/Cat number: [id number]    : Jessi Diaz [employer]WD Lot number: [id number]    Device identifier: [id number] Device identifier type: GS1      GUDID Information       Request status Successful        Brand name: ATTUNE Version/Model: 1504-    Company name: Yanira Santana ([employer]) MRI safety info as of 8/17/22: Labeling does not contain MRI Safety Information    Contains dry or latex rubber: No      GMDN P.T. name: Uncoated knee femur prosthesis, metallic                As of 8/17/2022       Status: Implanted                      Component Pat Ywe14rs Polyeth Dome Dg Medialized Attune - [id number] - Implanted   (Right) Knee      Inventory item: COMPONENT PAT [id number] POLYETH DOME DG MEDIALIZED ATTCHENTE Model/Cat number: [id number]    : Bianca Olsonr [employer] Lot number: [id number]    Device identifier: [id number] Device identifier type: GS1      GUDID Information       Request status Successful        Brand name: SARAH Version/Model: 1518-    Company name: [facility] [employer] ([place]) MRI safety info as of 8/17/22: Labeling does not contain MRI Safety Information    Contains dry or latex rubber: No      GMDN P.T. name: Polyethylene patella prosthesis                As of 8/17/2022       Status: Implanted                      Baseplate Tib Sz 8 Rot Platfrm Co Chrom Molybdenum Ti Russo - [id number] - Implanted   (Right) Knee      Inventory item: BASEPLATE TIB SZ 8 ROT PLATFRM CO CHROM MOLYBDENUM TI ALLY Model/Cat number: [id number]    : Bianca Olsonr [employer] Lot number: [id number]    Device identifier: [id number] Device identifier type: [facility]      Invo BioscienceDID Information       Request status Successful        Brand name: SARAH Version/Model: 1506-    Company name: [employer] ([employer]) MRI safety info as of 8/17/22: Labeling does not contain MRI Safety Information    Contains dry or latex rubber: No      GMDN P.T. name: Uncoated knee tibia prosthesis, metallic                As of 8/17/2022       Status: Implanted                      Insert Tib Sz 8 Thk5mm Knee Post Stbl Rot Platfrm Attune - [id number] - Implanted   (Right) Knee      Inventory item: INSERT TIB SZ 8 THK5MM KNEE POST STBL ROT PLATFRM ATTUNE Model/Cat number: [id number]    : Bianca Olsonr [employer] Lot number: [id number]    Device identifier: [id number] Device identifier type: GS1      GUDID Information       Request status Successful        Brand name: SARAH Version/Model: 1516-    Company name: [employer] ([employer]) MRI safety info as of 8/17/22: Labeling does not contain MRI Safety Information Contains dry or latex rubber: No      GMDN P.T. name: Tibial insert                As of 8/17/2022       Status: Implanted                      Cement [employer] [employer] Hv R+G 40gm -- [place] R+G [id number] - [id number] - Implanted   (Left) Knee      Inventory item: CEMENT [facility] HV R+G 40GM -- [place] R+G [id number] Model/Cat number: [id number]    : [employer] Lot number: [id number]      As of 10/17/2022       Status: Implanted                      Baseplate Tib Sz 8 Rot Platfrm Co Chrom Molybdenum Ti Russo - [id number] - Implanted   (Left) Knee      Inventory item: BASEPLATE TIB SZ 8 ROT PLATFRM CO CHROM MOLYBDENUM TI ALLY Model/Cat number: [id number]    : [employer] Lot number: [id number]    Device identifier: [id number] Device identifier type: GS1      KuapayDID Information       Request status Successful        Brand name: Digital Path Version/Model: 1506-    Company name: [employer]) MRI safety info as of 10/17/22: Labeling does not contain MRI Safety Information    Contains dry or latex rubber: No      GMDN P.T. name: Uncoated knee tibia prosthesis, metallic                As of 10/17/2022       Status: Implanted                      Insert Tib Sz 8 Thk5mm Knee Post Stbl Rot Platfrm Attune - [id number] - Implanted   (Left) Knee      Inventory item: INSERT TIB SZ 8 THK5MM KNEE POST STBL ROT PLATFRM ATTUNE Model/Cat number: [id number]    : Gina Foster [employer] Lot number: [id number]    Device identifier: [id number] Device identifier type: GS1      KuapayDID Information       Request status Successful        Brand name: Digital Path Version/Model: 1516-    Company name: Vinod Guillen ([employer]) MRI safety info as of 10/17/22: Labeling does not contain MRI Safety Information    Contains dry or latex rubber: No      GMDN P.T. name: Tibial insert                As of 10/17/2022       Status: Implanted                      Component Fem Sz 8 L Knee Post Stbl Gume Attune - [id number] - Implanted   (Left) Knee      Inventory item: COMPONENT FEM SZ 8 L KNEE POST STBL GUME ATTMozy Model/Cat number: [id number]    : Tiana Larch [employer] Lot number: [id number]    Device identifier: [id number] Device identifier type: GS1      GUDID Information       Request status Successful        Brand name: Tripcover Version/Model: 7429-    Company name: Miguel Ivey ([employer]) MRI safety info as of 10/17/22: Labeling does not contain MRI Safety Information    Contains dry or latex rubber: No      GMDN P.T. name: Uncoated knee femur prosthesis, metallic                As of 10/17/2022       Status: Implanted                      Component Pat Evd69xm Polyeth Dome Gume Medialized Attune - [id number] - Implanted   (Left) Knee      Inventory item: COMPONENT PAT [id number] POLYETH DOME GUME MEDIALIZED ATTMozy Model/Cat number: [id number]    : Tiana Larch [employer]_WD Lot number: [id number]    Device identifier: [id number] Device identifier type: GS1      GUDID Information       Request status Successful        Brand name: Tripcover Version/Model: 1518-    Company name: Miguel Ivey [employer]) MRI safety info as of 10/17/22: Labeling does not contain MRI Safety Information    Contains dry or latex rubber: No      GMDN P.T. name: Polyethylene patella prosthesis                As of 10/17/2022       Status: Implanted                               Operative procedure: Total knee replacement    OPERATIVE PROCEDURE:  Please note the first assistant role was to help in patient positioning and draping of the extremity in a sterile fashion. Also during the surgery the assistant's responsibilities included but not limited to extremity positioning during critical portions of the surgery. Assisting in using and placement of retractors during surgery. Lower extremity was prepped and draped in a sterile fashion.   After adequate anesthesia was given, the patient was placed in a well-padded supine position. Subvastus arthrotomy from the tibial tubercle to the superior pole of the patella was made. Knee was hyperflexed. Intramedullary reaming of distal femur and proximal tibia was performed. 10 mm of distal femur was cut. Anterior-posterior sizing guide was used. Anterior, posterior, chamfer cuts, and box cuts were made next. Proximal tibial cut and preparation performed. Posterior osteophyte meniscal remnants were removed, and also patella was everted. Free-hand cut of the patella was made. Trial components were placed. The patient was found to have excellent range of motion and stability with all trial components. All the trial components removed. Copious irrigation performed. Distal femur, proximal tibia, and patella were impacted in place. Excessive cement was removed. After the cement was hard, Subvastus arthrotomy closed with Vicryl stitch. Compressive dressing was applied. The patient was taken to PACU in stable condition. Please note due to the patient's BMI of greater than 30 significant surgical effort was required compared to the standard patient with a BMI lower than 30. Surgical time increased approximately 30% from the normal surgical time due to the patient's high BMI. Because of the high BMI patient's knee would be considered a complex total knee replacement rather than a standard total knee replacement.       Wilman Hurley MD

## 2022-10-17 NOTE — ANESTHESIA PROCEDURE NOTES
Spinal Block    Start time: 10/17/2022 9:02 AM  End time: 10/17/2022 9:13 AM  Performed by: Go Powell CRNA  Authorized by: Go Powell CRNA     Pre-procedure: Indications: at surgeon's request and primary anesthetic  Preanesthetic Checklist: patient identified, risks and benefits discussed, anesthesia consent, site marked, patient being monitored, timeout performed and fire risk safety assessment completed and verbalized    Timeout Time: 09:02 EDARUN Dotson)      Spinal Block:   Patient Position:  Seated  Prep Region:  Lumbar  Prep: Betadine      Location:  L2-3  Technique:  Single shot  Local: midazolam (VERSED) injection sedation - IntraVENous   4 mg - 10/17/2022 9:12:00 AM  lidocaine (PF) (XYLOCAINE) 10 mg/mL (1 %) IntraDERMAL - IntraDERMal   30 mg - 10/17/2022 9:12:00 AM  bupivacaine 0.75% in dextrose 8.25% preserv-free (SENSORCAINE) Intrathecal - Intrathecal   15 mg - 10/17/2022 9:12:00 AM  Local Dose (mL):  2  Med Admin Time: 10/17/2022 9:12 AM    Needle:   Needle Type:   Talat  Needle Gauge:  25 G  Attempts:  1      Events: CSF confirmed, no blood with aspiration and no paresthesia        Assessment:  Insertion:  Uncomplicated  Patient tolerance:  Patient tolerated the procedure well with no immediate complications

## 2022-10-17 NOTE — ANESTHESIA PROCEDURE NOTES
Peripheral Block    Start time: 10/17/2022 8:18 AM  End time: 10/17/2022 8:25 AM  Performed by: Go Powell CRNA  Authorized by: Go Powell CRNA       Pre-procedure: Indications: at surgeon's request and post-op pain management    Preanesthetic Checklist: patient identified, risks and benefits discussed, site marked, timeout performed, anesthesia consent given, patient being monitored and fire risk safety assessment completed and verbalized    Timeout Time: 08:18 EDT Vinod Castro)      Block Type:   Block Type:   Adductor canal block  Laterality:  Left  Monitoring:  Standard ASA monitoring, continuous pulse ox, frequent vital sign checks, heart rate, oxygen and responsive to questions  Injection Technique:  Single shot  Procedures: ultrasound guided    Patient Position: supine  Prep: chlorhexidine    Location:  Mid thigh  Needle Type:  Ultraplex  Needle Gauge:  20 G  Needle Localization:  Ultrasound guidance  Medication Injected:  Midazolam (VERSED) injection - IntraVENous   4 mg - 10/17/2022 8:24:00 AM  bupivacaine (PF) (MARCAINE) 0.5% injection - Peripheral Nerve Block   20 mL - 10/17/2022 8:24:00 AM  dexamethasone (DECADRON) 4 mg/mL injection - Peripheral Nerve Block   4 mg - 10/17/2022 8:24:00 AM  Med Admin Time: 10/17/2022 8:24 AM    Assessment:  Number of attempts:  1  Injection Assessment:  Incremental injection every 5 mL, no paresthesia, local visualized surrounding nerve on ultrasound, negative aspiration for blood and no intravascular symptoms  Patient tolerance:  Patient tolerated the procedure well with no immediate complications

## 2022-10-17 NOTE — INTERVAL H&P NOTE
Update History & Physical    The Patient's History and Physical was reviewed with the patient. The patient was examined. There was no change. The surgical site was confirmed by the patient and me. Patient understands and wants to proceed with the procedure. If applicable, I have discussed with the patient / power of  the rationale for blood component transfusion; its benefits in treating or preventing fatigue, organ damage, or death; and its risk which includes mild transfusion reactions, rare risk of blood borne infection, or more serious but rare reactions. I have discussed the alternatives to transfusion, including the risk and consequences of not receiving transfusion. The patient / Myesha Stapler of  had an opportunity to ask questions and had agreed to proceed with transfusion of blood components. Plan:  The risk, benefits, expected outcome, and alternative to the recommended procedure have been discussed with the patient.       Electronically signed by SAAD Dobbs on 10/17/2022 at 7:57 AM

## 2022-10-17 NOTE — DISCHARGE INSTRUCTIONS
TOTAL KNEE REPLACEMENT DISCHARGE INFORMATION    You have undergone a Total Knee Replacement. The following list is to provide you with some expectations over the next week upon your discharge from the hospital.     Please resume your Eliquis starting tomorrow as directed. Please be sure to continue your thigh-high compression stockings on both sides until instructed to discontinue them. Over the course of the next week, you should continue thigh high stockings on the operative leg, DO NOT GET THE INCISION WET until instructed to do so. Please make sure the stockings on the operative leg are pulled up all the way to the thigh to prevent any creases which may result in abrasions or creases in the skin. If the stockings are creating creases resulting in abrasions or blistering on the operative leg please remove the stockings. You may take the stockings off on the nonoperative leg once you arrive home. You may notice some bruising on your thigh and it may extend all the way to the ankles. That is perfectly normal early on. You may experience a clicking noise in your knee and that is normal because of the artificial knee. It is important to remember if you have any surgical procedure including dental procedures which may result in bleeding that an antibiotic 1 hour before the procedure will be required. Please let the provider performing the procedure know that you have artificial joint. If an antibiotic is not given by them please call our office and give us at least 5 business days to get you the appropriate antibiotics if needed. This rule applies indefinitely. If an Ace wrap is placed on your knee you may remove the Ace wrap only 48 hours after your surgery. We will leave the stockings on.   During the course of your  over the next week, should you experience fevers of 101.5 F, a white drainage from the incision, extreme redness around the incision, or the incision begins to have a pungent smell; Please call our office or page Dr. Felipe Dias whose numbers are provided in your discharge paperwork. To Page Dr. Felipe Dias please call [phone] and dial [phone]. Have the  page whomever is on call for Orthopedics. These are signs of infection and it should be addressed immediately. Please do not drive until instructed to do so. If you need a refill on pain medication please allow at least 2 business days notice for any refills. Immediate refill request may not be possible. Medication refill requests will not be addressed during non-business hours. Please do not page the on-call provider for pain medication refills after hours. It is very important for you to begin your Outpatient Physical Therapy within a couple days of the day of your discharge and your appointment should have been set up. If your physical therapy has not been set up please call our office the next business day for assistance. Details provided in a separate sheet. Remove ace wrap in 48 hrs after surgery but keep stockings on. You may remove the stocking and keep the stocking off on the nonoperative leg. Finish all antibiotics, start the antibiotics as soon as you go home if you have prescribed antibiotics. 14.  You should perform your daily home exercises at least 4 times a day 30 minutes each time. Perform foot pumps on both feet at least 10 times every 15 minutes while awake. This helps prevent swelling in the leg and can help prevent blood clots in the leg. On the operative leg if you have significant swelling you can also lay down flat and put 3 pillows under the heel so the heel is above the heart level and then perform foot pumps 4 times a day for 10 minutes to help bring the swelling down. 15.  Do not place anything under your knee while sleeping at night. Elevate your heel so your  is straight while sleeping at night. 16.  Perform deep breathing exercises 10 times every hour while awake.   17.  If you had a nerve block and you are not having pain the day of the surgery, at nighttime it is okay to take 1 pain medication before going to sleep to help prevent excruciating pain when the nerve block wears off. 18.  You may be given an ice pack machine use that to help prevent swelling. Do not apply heat to the incision area. 19.  While you are awake at least 10 times every 30 minutes move your foot up and down as if you are pumping gas from both feet to help prevent swelling and to promote blood circulation in the calf. 20.  If you develop sudden onset of shortness of breath or severe calf pain please go to closest emergency room. 21. Your pain medicine is a Narcotic and may cause constipation. You may take an over the counter stool softener while taking pain medicine. 25.  You will get surveys either via text message or email after your surgery on a periodic basis. Please participate in the surveys as it helps to track your progress. ICE THERAPY WRAP:    Keep ice therapy wrap on when resting. DO not wear when moving or walking. Ice packs are reusable. Ice Therapy wrap holds two ice packs at a time. Things to watch for:             Increased swelling of the surgical site             Spreading of redness around the incision site             Drainage of pus from the incision site             Developing a fever of 101.5 °F or higher             If any of these symptoms occur you have any questions please contact our office at [phone]. If you need to talk to Dr. Sharri Amaro or his staff after hours please call the office and have the on-call service get in touch with the provider on call that day. Please note pain medications are not refilled after hours or on weekends. If Dr. Sharri Amaro or his staff do not call you back within 30 minutes. Please tell the  to try again. Phone: [phone]  www. [employer]

## 2022-10-17 NOTE — ANESTHESIA PREPROCEDURE EVALUATION
Relevant Problems   CARDIOVASCULAR   (+) Chronic a-fib (HCC)   (+) Chronic atrial fibrillation (HCC)       Anesthetic History   No history of anesthetic complications            Review of Systems / Medical History  Patient summary reviewed, nursing notes reviewed and pertinent labs reviewed    Pulmonary  Within defined limits                 Neuro/Psych   Within defined limits           Cardiovascular  Within defined limits  Hypertension        Dysrhythmias   Hyperlipidemia    Exercise tolerance: >4 METS     GI/Hepatic/Renal  Within defined limits              Endo/Other  Within defined limits      Morbid obesity and arthritis     Other Findings              Physical Exam    Airway  Mallampati: II  TM Distance: 4 - 6 cm  Neck ROM: normal range of motion   Mouth opening: Normal     Cardiovascular  Regular rate and rhythm,  S1 and S2 normal,  no murmur, click, rub, or gallop  Rhythm: regular  Rate: normal         Dental  No notable dental hx       Pulmonary  Breath sounds clear to auscultation               Abdominal  GI exam deferred       Other Findings            Anesthetic Plan    ASA: 3  Anesthesia type: general - backup, regional and spinal - saphenous block      Post-op pain plan if not by surgeon: peripheral nerve block single    Induction: Intravenous  Anesthetic plan and risks discussed with: Patient

## 2022-10-18 ENCOUNTER — OFFICE VISIT (OUTPATIENT)
Dept: ORTHOPEDIC SURGERY | Age: 72
End: 2022-10-18
Payer: MEDICARE

## 2022-10-18 ENCOUNTER — HOSPITAL ENCOUNTER (EMERGENCY)
Age: 72
Discharge: HOME | End: 2022-10-18
Attending: FAMILY MEDICINE
Payer: MEDICARE

## 2022-10-18 ENCOUNTER — APPOINTMENT (OUTPATIENT)
Dept: PHYSICAL THERAPY | Age: 72
End: 2022-10-18
Payer: MEDICARE

## 2022-10-18 VITALS
BODY MASS INDEX: 36.04 KG/M2 | HEIGHT: 76 IN | DIASTOLIC BLOOD PRESSURE: 85 MMHG | OXYGEN SATURATION: 97 % | HEART RATE: 72 BPM | SYSTOLIC BLOOD PRESSURE: 114 MMHG | WEIGHT: 296 LBS | TEMPERATURE: 98 F | RESPIRATION RATE: 18 BRPM

## 2022-10-18 DIAGNOSIS — M96.830 POSTOPERATIVE HEMORRHAGE OF MUSCULOSKELETAL STRUCTURE FOLLOWING MUSCULOSKELETAL PROCEDURE: Primary | ICD-10-CM

## 2022-10-18 DIAGNOSIS — M25.562 LEFT KNEE PAIN, UNSPECIFIED CHRONICITY: Primary | ICD-10-CM

## 2022-10-18 PROCEDURE — 99024 POSTOP FOLLOW-UP VISIT: CPT | Performed by: ORTHOPAEDIC SURGERY

## 2022-10-18 PROCEDURE — 99282 EMERGENCY DEPT VISIT SF MDM: CPT

## 2022-10-18 NOTE — LETTER
10/19/2022    Patient: Edelmira Crane   YOB: 1950   Date of Visit: 10/18/2022     Camacho Harrington MD  [address] [address]  Via Fax: [phone]    Dear Camacho Harrington MD,      Thank you for referring Mr. Francia Grider to [address] [place] [address] for evaluation. My notes for this consultation are attached. If you have questions, please do not hesitate to call me. I look forward to following your patient along with you.       Sincerely,    Mark Gomez MD

## 2022-10-18 NOTE — ED TRIAGE NOTES
S/P left TKA by Dr Taisha Bain 10/17/22. Reports surgery was successful. Opted to be discharged instead of overnight observation. Tonight noted increase bleeding through dressing. Reports same issue from right leg last surgery.

## 2022-10-18 NOTE — ED PROVIDER NOTES
Patient presents to the ED for left knee post-op bleeding. He was seen and discharged yesterday for left knee replacement with Dr. Krystina Alvarez. He was offered the option to stay in the hospital but stated he really wanted to go home so he was discharge. Patient held his eliquis for 5 days prior to surgery as instructed. He denies pain, fever, chills, chest pain, SOB, n/v/d, abdominal pain, numbness, tingling, dizziness or syncope. He is unsure when his follow up is with Dr. Krystina Alvarez, states it is in his paperwork, but he will call his office today       Past Medical History:   Diagnosis Date    Chronic atrial fibrillation ([facility] [address]     Chronic diarrhea     HLD (hyperlipidemia)     HTN (hypertension)     Hyperparathyroidism ([facility] [address]     Impaired glucose tolerance     Obesity     Pleural effusion        Past Surgical History:   Procedure Laterality Date    HX PARATHYROIDECTOMY           History reviewed. No pertinent family history. Social History     Socioeconomic History    Marital status:      Spouse name: Not on file    Number of children: Not on file    Years of education: Not on file    Highest education level: Not on file   Occupational History    Not on file   Tobacco Use    Smoking status: Never    Smokeless tobacco: Never   Vaping Use    Vaping Use: Never used   Substance and Sexual Activity    Alcohol use: No    Drug use: Not on file    Sexual activity: Not on file   Other Topics Concern    Not on file   Social History Narrative    Not on file     Social Determinants of Health     Financial Resource Strain: Not on file   Food Insecurity: Not on file   Transportation Needs: Not on file   Physical Activity: Not on file   Stress: Not on file   Social Connections: Not on file   Intimate Partner Violence: Not on file   Housing Stability: Not on file         ALLERGIES: Patient has no known allergies. Review of Systems   Constitutional: Negative. Respiratory: Negative. Cardiovascular: Negative. Gastrointestinal: Negative. Musculoskeletal:         Post-op bleeding   Neurological: Negative. All other systems reviewed and are negative. Vitals:    10/18/22 0451   BP: (!) 129/91   Pulse: 95   Resp: 20   Temp: 98 °F (36.7 °C)   SpO2: 97%   Weight: 134.3 kg (296 lb)   Height: 6' 4\" (1.93 m)            Physical Exam  Vitals and nursing note reviewed. Constitutional:       General: He is not in acute distress. Appearance: Normal appearance. He is obese. He is not ill-appearing, toxic-appearing or diaphoretic. HENT:      Head: Normocephalic and atraumatic. Right Ear: External ear normal.      Left Ear: External ear normal.      Nose: Nose normal.   Eyes:      General: No scleral icterus. Right eye: No discharge. Left eye: No discharge. Extraocular Movements: Extraocular movements intact. Cardiovascular:      Rate and Rhythm: Normal rate. Pulses: Normal pulses. Pulmonary:      Effort: Pulmonary effort is normal. No respiratory distress. Musculoskeletal:         General: Swelling present. Skin:     General: Skin is warm and dry. Findings: Bruising present. Neurological:      General: No focal deficit present. Mental Status: He is alert and oriented to person, place, and time. Sensory: No sensory deficit.         MDM  Risk of Complications, Morbidity, and/or Mortality  Presenting problems: low  Diagnostic procedures: low  Management options: low  General comments: Patient hemodynamically stable, stable for discharge with outpatient Ortho follow up    Patient Progress  Patient progress: stable         Procedures

## 2022-10-18 NOTE — ED NOTES
I have reviewed discharge instructions with the patient. The patient verbalized understanding.          Reviewed medication compliance, follow up with PCP/Ortho, return to ER for any new or worrisome concerns

## 2022-10-18 NOTE — ED NOTES
Dressing to left  knee removed. Oozing noted at proximal area of incision line. Dr Israel Thompson at bedside for removal of dressing. Incision line denoted no signs of infection. Surgicel applied to incision line at area per Dr Israel Thompson followed by ABD pads, cast padding, thigh high compression hose and elastic wrap.

## 2022-10-18 NOTE — PROGRESS NOTES
Name: Angy Jarquin    : 1950     Service Dept: [address] [place] [address]    Chief Complaint   Patient presents with    Knee Pain        There were no vitals taken for this visit. No Known Allergies     Current Outpatient Medications   Medication Sig Dispense Refill    HYDROmorphone (Dilaudid) 4 mg tablet Take 1 Tablet by mouth every four to six (4-6) hours as needed for Pain (prn pain) for up to 10 days. Max Daily Amount: 24 mg. 30 Tablet 0    ondansetron (ZOFRAN ODT) 4 mg disintegrating tablet Take 1 Tablet by mouth every eight (8) hours as needed for Nausea, Vomiting or Nausea or Vomiting for up to 20 doses. 20 Tablet 0    neomycin-polymyxin-hydrocortisone (CORTISPORIN) otic solution 1 drop into affected eye      calcium carbonate (OS-CHARLOTTE) 500 mg calcium (1,250 mg) tablet 2 tablet      apixaban (ELIQUIS) 5 mg tablet Take 5 mg by mouth two (2) times a day. acetaminophen 325 mg cap Tylenol 325 mg capsule   PRN      pantoprazole (PROTONIX) 40 mg tablet       finasteride (PROSCAR) 5 mg tablet TAKE 1 TABLET DAILY 90 Tab 3    carvedilol (COREG) 6.25 mg tablet Take  by mouth two (2) times daily (with meals). docusate sodium (COLACE) 100 mg capsule Take 100 mg by mouth. cholecalciferol (VITAMIN D3) 25 mcg (1,000 unit) cap Take 5,000 Units by mouth daily. ferrous sulfate 325 mg (65 mg iron) tablet Take  by mouth Daily (before breakfast). dilTIAZem CD (CARDIZEM CD) 240 mg ER capsule         Patient Active Problem List   Diagnosis Code    Prostate nodule N40.2    BPH with obstruction/lower urinary tract symptoms N40.1, N13.8    Recurrent UTI N39.0    OA (osteoarthritis) of knee M17.9    Post-operative complication L33. 9XXA    Chronic atrial fibrillation (HCC) I48.20    Chronic a-fib (HCC) I48.20    Cellulitis of right leg L03.115      History reviewed. No pertinent family history.    Social History     Socioeconomic History    Marital status:  Tobacco Use    Smoking status: Never    Smokeless tobacco: Never   Vaping Use    Vaping Use: Never used   Substance and Sexual Activity    Alcohol use: No      Past Surgical History:   Procedure Laterality Date    HX PARATHYROIDECTOMY        Past Medical History:   Diagnosis Date    Chronic atrial fibrillation (HCC)     Chronic diarrhea     HLD (hyperlipidemia)     HTN (hypertension)     Hyperparathyroidism (HCC)     Impaired glucose tolerance     Obesity     Pleural effusion         I have reviewed and agree with PFSH and ROS and intake form in chart and the record furthermore I have reviewed prior medical record(s) regarding this patients care during this appointment. Review of Systems:   Patient is a pleasant appearing individual, appropriately dressed, well hydrated, well nourished, who is alert, appropriately oriented for age, and in no acute distress with a normal gait and normal affect who does not appear to be in any significant pain. Physical Exam:  Right knee - Neurovascularly intact with good cap refill, full range of motion and full strength, well healed incision noted, no swelling, no erythema, no instability. Encounter Diagnoses     ICD-10-CM ICD-9-CM   1. Left knee pain, unspecified chronicity  M25.562 719.46       HPI:  The patient is here status post left total knee replacement, postop day 1. Surgery was on 10/17/2022. Having some postop bleeding. Assessment/Plan:  Plan at this point, we will put a compressive dressing on. See him back in a couple of days and go from there. As part of continued conservative pain management options the patient was advised to utilize Tylenol or OTC NSAIDS as long as it is not medically contraindicated. Return to Office: Follow-up and Dispositions    Return in about 2 days (around 10/20/2022) for w/ Dr Abbey Fagan. Scribed by Candice Cole LPN as dictated by [facility] JOCELYNE Fagan MD.  Documentation True and Accepted Misha Fagan MD

## 2022-10-18 NOTE — PATIENT INSTRUCTIONS
Knee Pain or Injury: Care Instructions  Your Care Instructions     Injuries are a common cause of knee problems. Sudden (acute) injuries may be caused by a direct blow to the knee. They can also be caused by abnormal twisting, bending, or falling on the knee. Pain, bruising, or swelling may be severe, and may start within minutes of the injury. Overuse is another cause of knee pain. Other causes are climbing stairs, kneeling, and other activities that use the knee. Everyday wear and tear, especially as you get older, also can cause knee pain. Rest, along with home treatment, often relieves pain and allows your knee to heal. If you have a serious knee injury, you may need tests and treatment. Follow-up care is a key part of your treatment and safety. Be sure to make and go to all appointments, and call your doctor if you are having problems. It's also a good idea to know your test results and keep a list of the medicines you take. How can you care for yourself at home? Be safe with medicines. Read and follow all instructions on the label. If the doctor gave you a prescription medicine for pain, take it as prescribed. If you are not taking a prescription pain medicine, ask your doctor if you can take an over-the-counter medicine. Rest and protect your knee. Take a break from any activity that may cause pain. Put ice or a cold pack on your knee for 10 to 20 minutes at a time. Put a thin cloth between the ice and your skin. Prop up a sore knee on a pillow when you ice it or anytime you sit or lie down for the next 3 days. Try to keep it above the level of your heart. This will help reduce swelling. If your knee is not swollen, you can put moist heat, a heating pad, or a warm cloth on your knee. If your doctor recommends an elastic bandage, sleeve, or other type of support for your knee, wear it as directed. Follow your doctor's instructions about how much weight you can put on your leg.  Use a cane, crutches, or a walker as instructed. Follow your doctor's instructions about activity during your healing process. If you can do mild exercise, slowly increase your activity. Reach and stay at a healthy weight. Extra weight can strain the joints, especially the knees and hips, and make the pain worse. Losing even a few pounds may help. When should you call for help? Call 911 anytime you think you may need emergency care. For example, call if:    You have symptoms of a blood clot in your lung (called a pulmonary embolism). These may include:  Sudden chest pain. Trouble breathing. Coughing up blood. Call your doctor now or seek immediate medical care if:    You have severe or increasing pain. Your leg or foot turns cold or changes color. You cannot stand or put weight on your knee. Your knee looks twisted or bent out of shape. You cannot move your knee. You have signs of infection, such as: Increased pain, swelling, warmth, or redness. Red streaks leading from the knee. Pus draining from a place on your knee. A fever. You have signs of a blood clot in your leg (called a deep vein thrombosis), such as:  Pain in your calf, back of the knee, thigh, or groin. Redness and swelling in your leg or groin. Watch closely for changes in your health, and be sure to contact your doctor if:    You have tingling, weakness, or numbness in your knee. You have any new symptoms, such as swelling. You have bruises from a knee injury that last longer than 2 weeks. You do not get better as expected. Where can you learn more? Go to http://adrianna-alen.info/  Enter K195 in the search box to learn more about \"Knee Pain or Injury: Care Instructions. \"  Current as of: July 1, 2021               Content Version: 13.2  © 2006-2022 [employer].    Care instructions adapted under license by [employer] (which disclaims liability or warranty for this information). If you have questions about a medical condition or this instruction, always ask your healthcare professional. [place] [address] any warranty or liability for your use of this information.

## 2022-10-19 ENCOUNTER — TELEPHONE (OUTPATIENT)
Dept: ORTHOPEDIC SURGERY | Age: 72
End: 2022-10-19

## 2022-10-20 ENCOUNTER — TELEPHONE (OUTPATIENT)
Dept: ORTHOPEDIC SURGERY | Age: 72
End: 2022-10-20

## 2022-10-20 ENCOUNTER — OFFICE VISIT (OUTPATIENT)
Dept: ORTHOPEDIC SURGERY | Age: 72
End: 2022-10-20
Payer: MEDICARE

## 2022-10-20 DIAGNOSIS — Z96.652 STATUS POST LEFT KNEE REPLACEMENT: Primary | ICD-10-CM

## 2022-10-20 DIAGNOSIS — M25.562 LEFT KNEE PAIN, UNSPECIFIED CHRONICITY: Primary | ICD-10-CM

## 2022-10-20 PROCEDURE — 99024 POSTOP FOLLOW-UP VISIT: CPT | Performed by: ORTHOPAEDIC SURGERY

## 2022-10-20 RX ORDER — CEPHALEXIN 500 MG/1
500 CAPSULE ORAL 4 TIMES DAILY
Qty: 40 CAPSULE | Refills: 0 | Status: SHIPPED | OUTPATIENT
Start: 2022-10-20 | End: 2022-10-25 | Stop reason: ALTCHOICE

## 2022-10-20 RX ORDER — OXYCODONE AND ACETAMINOPHEN 5; 325 MG/1; MG/1
1 TABLET ORAL
Qty: 30 TABLET | Refills: 0 | Status: SHIPPED | OUTPATIENT
Start: 2022-10-20 | End: 2022-10-25 | Stop reason: SDUPTHER

## 2022-10-20 NOTE — PROGRESS NOTES
Name: Lamont Hernandez    : 1950     Service Dept: [address] [place] [address] and Sports Medicine    Chief Complaint   Patient presents with    Knee Pain        There were no vitals taken for this visit. No Known Allergies     Current Outpatient Medications   Medication Sig Dispense Refill    oxyCODONE-acetaminophen (Percocet) 5-325 mg per tablet Take 1 Tablet by mouth every six (6) hours as needed for Pain for up to 8 days. Max Daily Amount: 4 Tablets. Indications: pain 30 Tablet 0    HYDROmorphone (Dilaudid) 4 mg tablet Take 1 Tablet by mouth every four to six (4-6) hours as needed for Pain (prn pain) for up to 10 days. Max Daily Amount: 24 mg. 30 Tablet 0    ondansetron (ZOFRAN ODT) 4 mg disintegrating tablet Take 1 Tablet by mouth every eight (8) hours as needed for Nausea, Vomiting or Nausea or Vomiting for up to 20 doses. 20 Tablet 0    neomycin-polymyxin-hydrocortisone (CORTISPORIN) otic solution 1 drop into affected eye      calcium carbonate (OS-CHARLOTTE) 500 mg calcium (1,250 mg) tablet 2 tablet      apixaban (ELIQUIS) 5 mg tablet Take 5 mg by mouth two (2) times a day. acetaminophen 325 mg cap Tylenol 325 mg capsule   PRN      pantoprazole (PROTONIX) 40 mg tablet       finasteride (PROSCAR) 5 mg tablet TAKE 1 TABLET DAILY 90 Tab 3    carvedilol (COREG) 6.25 mg tablet Take  by mouth two (2) times daily (with meals). docusate sodium (COLACE) 100 mg capsule Take 100 mg by mouth. cholecalciferol (VITAMIN D3) 25 mcg (1,000 unit) cap Take 5,000 Units by mouth daily. ferrous sulfate 325 mg (65 mg iron) tablet Take  by mouth Daily (before breakfast). dilTIAZem CD (CARDIZEM CD) 240 mg ER capsule         Patient Active Problem List   Diagnosis Code    Prostate nodule N40.2    BPH with obstruction/lower urinary tract symptoms N40.1, N13.8    Recurrent UTI N39.0    OA (osteoarthritis) of knee M17.9    Post-operative complication W68. 9XXA    Chronic atrial fibrillation ([address] I48.20    Chronic a-fib (HCC) I48.20    Cellulitis of right leg L03.115      History reviewed. No pertinent family history. Social History     Socioeconomic History    Marital status:    Tobacco Use    Smoking status: Never    Smokeless tobacco: Never   Vaping Use    Vaping Use: Never used   Substance and Sexual Activity    Alcohol use: No      Past Surgical History:   Procedure Laterality Date    HX PARATHYROIDECTOMY        Past Medical History:   Diagnosis Date    Chronic atrial fibrillation (HCC)     Chronic diarrhea     HLD (hyperlipidemia)     HTN (hypertension)     Hyperparathyroidism (HCC)     Impaired glucose tolerance     Obesity     Pleural effusion         I have reviewed and agree with [address] [place] [address] Nw and ROS and intake form in chart and the record furthermore I have reviewed prior medical record(s) regarding this patients care during this appointment. Review of Systems:   Patient is a pleasant appearing individual, appropriately dressed, well hydrated, well nourished, who is alert, appropriately oriented for age, and in no acute distress with a cane based gait and normal affect who does not appear to be in any significant pain. Physical Exam:  Right Knee - Full Range of Motion, No crepitation, Grossly neurovascularly intact, Good cap refill, No skin lesion, No effusion, No gross instability, No point tenderness, No quadriceps weakness, No medial or lateral joint line tenderness, Negative Lachman's, Negative Joanie's exam.    Encounter Diagnoses     ICD-10-CM ICD-9-CM   1. Left knee pain, unspecified chronicity  M25.562 719.46       HPI:  The patient is here with a chief complaint of left knee pain, status post left total knee replacement, doing well. Still having a little bit of hemorrhagic blisters. Incision is clean, dry and intact with some spotting. Assessment/Plan:  Plan at this point, we will see him back and do compressive dressing and go from there.       As part of continued conservative pain management options the patient was advised to utilize Tylenol or OTC NSAIDS as long as it is not medically contraindicated. Return to Office: Follow-up and Dispositions    Return in about 5 days (around 10/25/2022) for w/ Dr Adams Mccall. Scribed by Vincent Johnson LPN as dictated by [facility] JOCELYNE Mccall MD.  Documentation True and Accepted Misha JOCELYNE Mccall MD

## 2022-10-20 NOTE — TELEPHONE ENCOUNTER
Pt aware
Pt is requesting a refill on his pain medication (Percocoet)     Pharmacy-- [facility]
last psychiatrist he saw

## 2022-10-20 NOTE — LETTER
10/24/2022    Patient: Daphney Blanco   YOB: 1950   Date of Visit: 10/20/2022     Júnior Wang MD  [address] [address]  Via Fax: [phone]    Dear Júnior Wang MD,      Thank you for referring Mr. Mir River to [address] [place] [address] for evaluation. My notes for this consultation are attached. If you have questions, please do not hesitate to call me. I look forward to following your patient along with you.       Sincerely,    Raymundo Mathur MD

## 2022-10-20 NOTE — PATIENT INSTRUCTIONS
Knee Pain or Injury: Care Instructions  Overview     Injuries are a common cause of knee problems. Sudden (acute) injuries may be caused by a direct blow to the knee. They can also be caused by abnormal twisting, bending, or falling on the knee. Pain, bruising, or swelling may be severe, and may start within minutes of the injury. Overuse is another cause of knee pain. Other causes are climbing stairs, kneeling, and other activities that use the knee. Everyday wear and tear, especially as you get older, also can cause knee pain. Rest, along with home treatment, often relieves pain and allows your knee to heal. If you have a serious knee injury, you may need tests and treatment. Follow-up care is a key part of your treatment and safety. Be sure to make and go to all appointments, and call your doctor if you are having problems. It's also a good idea to know your test results and keep a list of the medicines you take. How can you care for yourself at home? Be safe with medicines. Read and follow all instructions on the label. If the doctor gave you a prescription medicine for pain, take it as prescribed. If you are not taking a prescription pain medicine, ask your doctor if you can take an over-the-counter medicine. Rest and protect your knee. Take a break from any activity that may cause pain. Put ice or a cold pack on your knee for 10 to 20 minutes at a time. Put a thin cloth between the ice and your skin. Prop up a sore knee on a pillow when you ice it or anytime you sit or lie down for the next 3 days. Try to keep it above the level of your heart. This will help reduce swelling. If your knee is not swollen, you can put moist heat, a heating pad, or a warm cloth on your knee. If your doctor recommends an elastic bandage, sleeve, or other type of support for your knee, wear it as directed. Follow your doctor's instructions about how much weight you can put on your leg.  Use a cane, crutches, or a walker as instructed. Follow your doctor's instructions about activity during your healing process. If you can do mild exercise, slowly increase your activity. Stay at a healthy weight. Extra weight can strain the joints, especially the knees and hips, and make the pain worse. Losing a few pounds may help. When should you call for help? Call 911 anytime you think you may need emergency care. For example, call if:    You have symptoms of a blood clot in your lung (called a pulmonary embolism). These may include:  Sudden chest pain. Trouble breathing. Coughing up blood. Call your doctor now or seek immediate medical care if:    You have severe or increasing pain. Your leg or foot turns cold or changes color. You cannot stand or put weight on your knee. Your knee looks twisted or bent out of shape. You cannot move your knee. You have signs of infection, such as: Increased pain, swelling, warmth, or redness. Red streaks leading from the knee. Pus draining from a place on your knee. A fever. You have signs of a blood clot in your leg (called a deep vein thrombosis), such as:  Pain in your calf, back of the knee, thigh, or groin. Redness and swelling in your leg or groin. Watch closely for changes in your health, and be sure to contact your doctor if:    You have tingling, weakness, or numbness in your knee. You have any new symptoms, such as swelling. You have bruises from a knee injury that last longer than 2 weeks. You do not get better as expected. Where can you learn more? Go to http://www.gray.com/  Enter K195 in the search box to learn more about \"Knee Pain or Injury: Care Instructions. \"  Current as of: March 9, 2022               Content Version: 13.4  © 6877-9561 [employer]. Care instructions adapted under license by [employer] (which disclaims liability or warranty for this information).  If you have questions about a medical condition or this instruction, always ask your healthcare professional. [place] [address] any warranty or liability for your use of this information.

## 2022-10-25 ENCOUNTER — OFFICE VISIT (OUTPATIENT)
Dept: ORTHOPEDIC SURGERY | Age: 72
End: 2022-10-25
Payer: MEDICARE

## 2022-10-25 DIAGNOSIS — M25.562 LEFT KNEE PAIN, UNSPECIFIED CHRONICITY: Primary | ICD-10-CM

## 2022-10-25 DIAGNOSIS — Z96.652 STATUS POST LEFT KNEE REPLACEMENT: ICD-10-CM

## 2022-10-25 PROCEDURE — 99024 POSTOP FOLLOW-UP VISIT: CPT | Performed by: ORTHOPAEDIC SURGERY

## 2022-10-25 RX ORDER — AMOXICILLIN AND CLAVULANATE POTASSIUM 500; 125 MG/1; MG/1
1 TABLET, FILM COATED ORAL EVERY 12 HOURS
Qty: 20 TABLET | Refills: 0 | Status: SHIPPED | OUTPATIENT
Start: 2022-10-25 | End: 2022-11-04

## 2022-10-25 RX ORDER — AMOXICILLIN AND CLAVULANATE POTASSIUM 500; 125 MG/1; MG/1
1 TABLET, FILM COATED ORAL EVERY 12 HOURS
Qty: 20 TABLET | Refills: 0 | Status: SHIPPED | OUTPATIENT
Start: 2022-10-25 | End: 2022-10-25 | Stop reason: CLARIF

## 2022-10-25 RX ORDER — OXYCODONE AND ACETAMINOPHEN 5; 325 MG/1; MG/1
1 TABLET ORAL
Qty: 30 TABLET | Refills: 0 | Status: SHIPPED | OUTPATIENT
Start: 2022-10-25 | End: 2022-11-02 | Stop reason: SDUPTHER

## 2022-10-25 NOTE — PROGRESS NOTES
Name: Angy Jarquin    : 1950     Service Dept: [address] [place] [address] Sports [facility]    Chief Complaint   Patient presents with    Surgical Follow-up    Knee Pain        There were no vitals taken for this visit. No Known Allergies     Current Outpatient Medications   Medication Sig Dispense Refill    amoxicillin-clavulanate (AUGMENTIN) 500-125 mg per tablet Take 1 Tablet by mouth every twelve (12) hours for 10 days. Indications: skin infection due to Staphylococcus aureus bacteria 20 Tablet 0    oxyCODONE-acetaminophen (Percocet) 5-325 mg per tablet Take 1 Tablet by mouth every six (6) hours as needed for Pain for up to 8 days. Max Daily Amount: 4 Tablets. Indications: pain 30 Tablet 0    ondansetron (ZOFRAN ODT) 4 mg disintegrating tablet Take 1 Tablet by mouth every eight (8) hours as needed for Nausea, Vomiting or Nausea or Vomiting for up to 20 doses. 20 Tablet 0    neomycin-polymyxin-hydrocortisone (CORTISPORIN) otic solution 1 drop into affected eye      calcium carbonate (OS-CHARLOTTE) 500 mg calcium (1,250 mg) tablet 2 tablet      apixaban (ELIQUIS) 5 mg tablet Take 5 mg by mouth two (2) times a day. acetaminophen 325 mg cap Tylenol 325 mg capsule   PRN      pantoprazole (PROTONIX) 40 mg tablet       finasteride (PROSCAR) 5 mg tablet TAKE 1 TABLET DAILY 90 Tab 3    carvedilol (COREG) 6.25 mg tablet Take  by mouth two (2) times daily (with meals). docusate sodium (COLACE) 100 mg capsule Take 100 mg by mouth. cholecalciferol (VITAMIN D3) 25 mcg (1,000 unit) cap Take 5,000 Units by mouth daily. ferrous sulfate 325 mg (65 mg iron) tablet Take  by mouth Daily (before breakfast).       dilTIAZem CD (CARDIZEM CD) 240 mg ER capsule         Patient Active Problem List   Diagnosis Code    Prostate nodule N40.2    BPH with obstruction/lower urinary tract symptoms N40.1, N13.8    Recurrent UTI N39.0    OA (osteoarthritis) of knee M17.9    Post-operative complication T81. 9XXA    Chronic atrial fibrillation (HCC) I48.20    Chronic a-fib (HCC) I48.20    Cellulitis of right leg L03.115      History reviewed. No pertinent family history. Social History     Socioeconomic History    Marital status:    Tobacco Use    Smoking status: Never    Smokeless tobacco: Never   Vaping Use    Vaping Use: Never used   Substance and Sexual Activity    Alcohol use: No      Past Surgical History:   Procedure Laterality Date    HX PARATHYROIDECTOMY        Past Medical History:   Diagnosis Date    Chronic atrial fibrillation (HCC)     Chronic diarrhea     HLD (hyperlipidemia)     HTN (hypertension)     Hyperparathyroidism (HCC)     Impaired glucose tolerance     Obesity     Pleural effusion         I have reviewed and agree with [address] [place] [address] Nw and ROS and intake form in chart and the record furthermore I have reviewed prior medical record(s) regarding this patients care during this appointment. Review of Systems:   Patient is a pleasant appearing individual, appropriately dressed, well hydrated, well nourished, who is alert, appropriately oriented for age, and in no acute distress with a cane based gait and normal affect who does not appear to be in any significant pain. Physical Exam:  Right Knee - Full Range of Motion, No crepitation, Grossly neurovascularly intact, Good cap refill, No skin lesion, No effusion, No gross instability, No point tenderness, No quadriceps weakness, No medial or lateral joint line tenderness, Negative Lachman's, Negative Joanie's exam.    Encounter Diagnoses     ICD-10-CM ICD-9-CM   1. Left knee pain, unspecified chronicity  M25.562 719.46       HPI:  The patient is here with a chief complaint of left knee pain. Status post left total knee replacement, doing well. Just a little bit of blistering, much improved, and no bleeding compared to before.     Assessment/Plan:  Plan at this point, we will have him start Augmentin, refill Percocet, and we will go from there.      As part of continued conservative pain management options the patient was advised to utilize Tylenol or OTC NSAIDS as long as it is not medically contraindicated. Return to Office: Follow-up and Dispositions    Return in about 2 weeks (around 11/8/2022) for sae Bartlett. Scribed by Sol Zarate LPN as dictated by [facility] [place] JOCELYNE Olsen MD.  Documentation True and Accepted Misha Olsen MD

## 2022-10-25 NOTE — LETTER
10/26/2022    Patient: Dipak Kirby   YOB: 1950   Date of Visit: 10/25/2022     Rupali Hoffman MD  [address] [address]  Via Fax: [phone]    Dear Rupali Hoffman MD,      Thank you for referring Mr. Tono Temple to [address] [place] [address] for evaluation. My notes for this consultation are attached. If you have questions, please do not hesitate to call me. I look forward to following your patient along with you.       Sincerely,    Luis Mcmullen MD

## 2022-10-25 NOTE — PATIENT INSTRUCTIONS
Knee Pain or Injury: Care Instructions  Overview     Injuries are a common cause of knee problems. Sudden (acute) injuries may be caused by a direct blow to the knee. They can also be caused by abnormal twisting, bending, or falling on the knee. Pain, bruising, or swelling may be severe, and may start within minutes of the injury. Overuse is another cause of knee pain. Other causes are climbing stairs, kneeling, and other activities that use the knee. Everyday wear and tear, especially as you get older, also can cause knee pain. Rest, along with home treatment, often relieves pain and allows your knee to heal. If you have a serious knee injury, you may need tests and treatment. Follow-up care is a key part of your treatment and safety. Be sure to make and go to all appointments, and call your doctor if you are having problems. It's also a good idea to know your test results and keep a list of the medicines you take. How can you care for yourself at home? Be safe with medicines. Read and follow all instructions on the label. If the doctor gave you a prescription medicine for pain, take it as prescribed. If you are not taking a prescription pain medicine, ask your doctor if you can take an over-the-counter medicine. Rest and protect your knee. Take a break from any activity that may cause pain. Put ice or a cold pack on your knee for 10 to 20 minutes at a time. Put a thin cloth between the ice and your skin. Prop up a sore knee on a pillow when you ice it or anytime you sit or lie down for the next 3 days. Try to keep it above the level of your heart. This will help reduce swelling. If your knee is not swollen, you can put moist heat, a heating pad, or a warm cloth on your knee. If your doctor recommends an elastic bandage, sleeve, or other type of support for your knee, wear it as directed. Follow your doctor's instructions about how much weight you can put on your leg.  Use a cane, crutches, or a walker as instructed. Follow your doctor's instructions about activity during your healing process. If you can do mild exercise, slowly increase your activity. Stay at a healthy weight. Extra weight can strain the joints, especially the knees and hips, and make the pain worse. Losing a few pounds may help. When should you call for help? Call 911 anytime you think you may need emergency care. For example, call if:    You have symptoms of a blood clot in your lung (called a pulmonary embolism). These may include:  Sudden chest pain. Trouble breathing. Coughing up blood. Call your doctor now or seek immediate medical care if:    You have severe or increasing pain. Your leg or foot turns cold or changes color. You cannot stand or put weight on your knee. Your knee looks twisted or bent out of shape. You cannot move your knee. You have signs of infection, such as: Increased pain, swelling, warmth, or redness. Red streaks leading from the knee. Pus draining from a place on your knee. A fever. You have signs of a blood clot in your leg (called a deep vein thrombosis), such as:  Pain in your calf, back of the knee, thigh, or groin. Redness and swelling in your leg or groin. Watch closely for changes in your health, and be sure to contact your doctor if:    You have tingling, weakness, or numbness in your knee. You have any new symptoms, such as swelling. You have bruises from a knee injury that last longer than 2 weeks. You do not get better as expected. Where can you learn more? Go to http://www.gray.com/  Enter K195 in the search box to learn more about \"Knee Pain or Injury: Care Instructions. \"  Current as of: March 9, 2022               Content Version: 13.4  © 4729-3391 [employer]. Care instructions adapted under license by [employer] (which disclaims liability or warranty for this information).  If you have questions about a medical condition or this instruction, always ask your healthcare professional. [address] [address] any warranty or liability for your use of this information.

## 2022-11-02 ENCOUNTER — TELEPHONE (OUTPATIENT)
Dept: ORTHOPEDIC SURGERY | Age: 72
End: 2022-11-02

## 2022-11-02 DIAGNOSIS — Z96.652 STATUS POST LEFT KNEE REPLACEMENT: ICD-10-CM

## 2022-11-02 RX ORDER — OXYCODONE AND ACETAMINOPHEN 5; 325 MG/1; MG/1
1 TABLET ORAL
Qty: 30 TABLET | Refills: 0 | Status: SHIPPED | OUTPATIENT
Start: 2022-11-02 | End: 2022-11-07 | Stop reason: SDUPTHER

## 2022-11-07 ENCOUNTER — OFFICE VISIT (OUTPATIENT)
Dept: ORTHOPEDIC SURGERY | Age: 72
End: 2022-11-07
Payer: MEDICARE

## 2022-11-07 DIAGNOSIS — Z96.652 STATUS POST LEFT KNEE REPLACEMENT: ICD-10-CM

## 2022-11-07 PROCEDURE — 99024 POSTOP FOLLOW-UP VISIT: CPT | Performed by: NURSE PRACTITIONER

## 2022-11-07 RX ORDER — OXYCODONE AND ACETAMINOPHEN 5; 325 MG/1; MG/1
1 TABLET ORAL
Qty: 30 TABLET | Refills: 0 | Status: SHIPPED | OUTPATIENT
Start: 2022-11-07 | End: 2022-11-15

## 2022-11-11 ENCOUNTER — VIRTUAL VISIT (OUTPATIENT)
Dept: ORTHOPEDIC SURGERY | Age: 72
End: 2022-11-11
Payer: MEDICARE

## 2022-11-11 DIAGNOSIS — M25.562 LEFT KNEE PAIN, UNSPECIFIED CHRONICITY: Primary | ICD-10-CM

## 2022-11-11 PROCEDURE — 99024 POSTOP FOLLOW-UP VISIT: CPT | Performed by: ORTHOPAEDIC SURGERY

## 2022-11-11 NOTE — PATIENT INSTRUCTIONS
Knee Pain or Injury: Care Instructions  Overview     Injuries are a common cause of knee problems. Sudden (acute) injuries may be caused by a direct blow to the knee. They can also be caused by abnormal twisting, bending, or falling on the knee. Pain, bruising, or swelling may be severe, and may start within minutes of the injury. Overuse is another cause of knee pain. Other causes are climbing stairs, kneeling, and other activities that use the knee. Everyday wear and tear, especially as you get older, also can cause knee pain. Rest, along with home treatment, often relieves pain and allows your knee to heal. If you have a serious knee injury, you may need tests and treatment. Follow-up care is a key part of your treatment and safety. Be sure to make and go to all appointments, and call your doctor if you are having problems. It's also a good idea to know your test results and keep a list of the medicines you take. How can you care for yourself at home? Be safe with medicines. Read and follow all instructions on the label. If the doctor gave you a prescription medicine for pain, take it as prescribed. If you are not taking a prescription pain medicine, ask your doctor if you can take an over-the-counter medicine. Rest and protect your knee. Take a break from any activity that may cause pain. Put ice or a cold pack on your knee for 10 to 20 minutes at a time. Put a thin cloth between the ice and your skin. Prop up a sore knee on a pillow when you ice it or anytime you sit or lie down for the next 3 days. Try to keep it above the level of your heart. This will help reduce swelling. If your knee is not swollen, you can put moist heat, a heating pad, or a warm cloth on your knee. If your doctor recommends an elastic bandage, sleeve, or other type of support for your knee, wear it as directed. Follow your doctor's instructions about how much weight you can put on your leg.  Use a cane, crutches, or a walker as instructed. Follow your doctor's instructions about activity during your healing process. If you can do mild exercise, slowly increase your activity. Stay at a healthy weight. Extra weight can strain the joints, especially the knees and hips, and make the pain worse. Losing a few pounds may help. When should you call for help? Call 911 anytime you think you may need emergency care. For example, call if:    You have symptoms of a blood clot in your lung (called a pulmonary embolism). These may include:  Sudden chest pain. Trouble breathing. Coughing up blood. Call your doctor now or seek immediate medical care if:    You have severe or increasing pain. Your leg or foot turns cold or changes color. You cannot stand or put weight on your knee. Your knee looks twisted or bent out of shape. You cannot move your knee. You have signs of infection, such as: Increased pain, swelling, warmth, or redness. Red streaks leading from the knee. Pus draining from a place on your knee. A fever. You have signs of a blood clot in your leg (called a deep vein thrombosis), such as:  Pain in your calf, back of the knee, thigh, or groin. Redness and swelling in your leg or groin. Watch closely for changes in your health, and be sure to contact your doctor if:    You have tingling, weakness, or numbness in your knee. You have any new symptoms, such as swelling. You have bruises from a knee injury that last longer than 2 weeks. You do not get better as expected. Where can you learn more? Go to http://www.gray.com/  Enter K195 in the search box to learn more about \"Knee Pain or Injury: Care Instructions. \"  Current as of: March 9, 2022               Content Version: 13.4  © 3183-1744 [employer]. Care instructions adapted under license by [employer] (which disclaims liability or warranty for this information).  If you have questions about a medical condition or this instruction, always ask your healthcare professional. [address] [address] any warranty or liability for your use of this information.

## 2022-11-11 NOTE — PROGRESS NOTES
Name: Usha Turcios    : 1950     Service Dept: [facility]    Patient's Pharmacies:    [id number]  [address]  [place] [address] [address]  Phone: [phone] Fax: [phone]  [address] [address]  Phone: [phone] Fax: [phone] [facility], [address] [place], [place] [address]  Phone: [phone] Fax: [phone]       Chief Complaint   Patient presents with    Surgical Follow-up    Knee Pain        There were no vitals taken for this visit. No Known Allergies   Current Outpatient Medications   Medication Sig Dispense Refill    oxyCODONE-acetaminophen (Percocet) 5-325 mg per tablet Take 1 Tablet by mouth every six (6) hours as needed for Pain for up to 8 days. Max Daily Amount: 4 Tablets. Indications: pain 30 Tablet 0    ondansetron (ZOFRAN ODT) 4 mg disintegrating tablet Take 1 Tablet by mouth every eight (8) hours as needed for Nausea, Vomiting or Nausea or Vomiting for up to 20 doses. 20 Tablet 0    neomycin-polymyxin-hydrocortisone (CORTISPORIN) otic solution 1 drop into affected eye      calcium carbonate (OS-CHARLOTTE) 500 mg calcium (1,250 mg) tablet 2 tablet      apixaban (ELIQUIS) 5 mg tablet Take 5 mg by mouth two (2) times a day. acetaminophen 325 mg cap Tylenol 325 mg capsule   PRN      pantoprazole (PROTONIX) 40 mg tablet       finasteride (PROSCAR) 5 mg tablet TAKE 1 TABLET DAILY 90 Tab 3    carvedilol (COREG) 6.25 mg tablet Take  by mouth two (2) times daily (with meals). docusate sodium (COLACE) 100 mg capsule Take 100 mg by mouth. cholecalciferol (VITAMIN D3) 25 mcg (1,000 unit) cap Take 5,000 Units by mouth daily. ferrous sulfate 325 mg (65 mg iron) tablet Take  by mouth Daily (before breakfast). dilTIAZem CD (CARDIZEM CD) 240 mg ER capsule         Patient Active Problem List   Diagnosis Code    Prostate nodule N40.2    BPH with obstruction/lower urinary tract symptoms N40.1, N13.8    Recurrent UTI N39.0    OA (osteoarthritis) of knee M17.9    Post-operative complication C94. 9XXA    Chronic atrial fibrillation (HCC) I48.20    Chronic a-fib (HCC) I48.20    Cellulitis of right leg L03.115      History reviewed. No pertinent family history. Social History     Socioeconomic History    Marital status:    Tobacco Use    Smoking status: Never    Smokeless tobacco: Never   Vaping Use    Vaping Use: Never used   Substance and Sexual Activity    Alcohol use: No      Past Surgical History:   Procedure Laterality Date    HX PARATHYROIDECTOMY        Past Medical History:   Diagnosis Date    Chronic atrial fibrillation (HCC)     Chronic diarrhea     HLD (hyperlipidemia)     HTN (hypertension)     Hyperparathyroidism (HCC)     Impaired glucose tolerance     Obesity     Pleural effusion         I have reviewed and agree with [address] [place] [address] Nw and ROS and intake form in chart and the record furthermore I have reviewed prior medical record(s) regarding this patients care during this appointment. Review of Systems:   Patient is a pleasant appearing individual, appropriately dressed, well hydrated, well nourished, who is alert, appropriately oriented for age, and in no acute distress with a normal gait and normal affect who does not appear to be in any significant pain. Physical Exam:  Left knee - Neurovascularly intact with good cap refill, full range of motion and full strength, well healed incision noted, no swelling, no erythema, no instability. Right knee - Decrease range of motion with flexion, Some crepitation, Grossly neurovascularly intact, Good cap refill, No skin lesion, Moderate swelling, No gross instability, Some quadriceps weakness    Encounter Diagnoses     ICD-10-CM ICD-9-CM   1.  Left knee pain, unspecified chronicity  M25.562 719.46       VIRTUAL APPOINTMENT    Physical examination, unremarkable. No evidence of drainage. No septic signs. HPI:  The patient is here status post left total knee replacement, doing well, just a little bit of soreness, having little bit of serosanguineous drainage. Assessment/Plan:  Plan at this point, activities as tolerated started, no restrictions. We will see the patient back as needed and go from there. As part of continued conservative pain management options the patient was advised to utilize Tylenol or OTC NSAIDS as long as it is not medically contraindicated. Hang Spicer was evaluated through a synchronous (real-time) audio-video encounter. The patient (or guardian if applicable) is aware that this is a billable service. Verbal consent to proceed has been obtained within the past 12 months. The visit was conducted pursuant to the emergency declaration under the [address] [place] [address], [address] [place] [address] authority and the [employer] and [employer] General Act. Patient identification was verified, and a caregiver was present when appropriate. The patient was located in a state where the provider was credentialed to provide care. Return to Office: Follow-up and Dispositions    Return in about 5 days (around 11/16/2022) for marisa/ devin [place]. Scribed by Amando Alvares LPN as dictated by [facility] JOCELYNE Alvarez MD.  Documentation True and Accepted Misha Alvarez MD

## 2022-11-14 NOTE — THERAPY DISCHARGE
[address] [place] [address] PHYSICAL THERAPY  [address] [place] [address] Aric Zazueta, [address] * Phone: [phone] * Fax: [phone]  DISCHARGE SUMMARY FOR PHYSICAL THERAPY            Patient Name: Jose Mclean : 1950   Treatment/Medical Diagnosis: Pain in left knee [M25.562]   Onset Date: 22    Referral Source: Fabricio Quigley MD [place] [facility]): 22   Prior Hospitalization: See Medical History Provider #: [id number]   Prior Level of Function: Independent with [facility]   Comorbidities: Atrial fibrillation, HLD, HTN, Hyperparathyroidism, Obesity, R TKA   Medications: Verified on Patient Summary List   Visits from [facility]: 8 Missed Visits: 0       Subjective:  Pt reports stiffness and tightness as chief complaint. Pt to have L TKA next week. Objective:  Palpation: gross tenderness along R knee and calf; decreased to light touch along incision; incision healing appropriately         ROM / Strength  [] Unable to assess                  AROM                         PROM                     Strength       Left Right Left Right Left Right   Hip Flexion         5/5 4+/5     Extension                 Abduction                 Adduction               Knee Flexion 122 88*   90* 5/5 4+/5*     Extension -2 -7*     5/5 4/5*   Ankle Plantarflexion         5/5 5/5     Dorsiflexion         5/5 5/5   *indicative of pain     Patellar Mobility:     Hypermobile:       [] Left   [] Right         Hypomobile:   [] Left   [x] Right     Girth Measurements:               Midpatellar:                             Left 50.0 cm       Right 52.5 cm                               .   Gait:                [] Normal    [] Abnormal    [] Antalgic    [] NWB    Device: SPC >noAD                          Distance: 300 feet  Comments: antalgic, decreased kush, asymmetrical stance times, decreased knee flexion, cues for initial heel contact and posture; step negotiation with step to pattern and single handrail         Balance: Standing static: Good, Standing dynamic: Fair     Other tests/comments: FOTO: 38, LEFS: 21.5                   Short Term Goals:  Pt will be independent with HEP to improve right knee ROM & strength in 3 weeks. MET. Pt will improve R knee PROM -5-120 degrees for improved ADL efficiency in 3 weeks. Not Met. Pt will improve FOTO score >40 for improved functional mobility in 3 weeks. Not Met. Pt will be able to ambulate without AD >400 feet on level surfaces for improved household mobility in 3 weeks. MET. Long Term Goals:  Pt will improve R knee AROM to -2-120 degrees for improved ability to get in/out of car in 6 weeks. Not Met. Pt will improve R knee strength to 5/5 for improved ability to climb steps at home in 6 weeks. Not Met. Pt will improve FOTO score >50 for improved functional mobility & quality of life in 6 weeks. Not Met. Pt will be able to ambulate without AD >500 feet on level/unlevel surfaces for improved ability to navigate community in 6 weeks. MET. Key Functional Changes/Progress: Pt did not return within 30 days of last visit for formal reassessment of R TKA, will d/c prior to resuming care s/p L TKA. Assessments/Recommendations: Discharge current POC, will resume s/p L TKA. If you have any questions/comments please contact us directly at [phone]. Thank you for allowing us to assist in the care of your patient. Therapist Signature: Paulo Landau, PT, DPT Date: 11/14/2022   Reporting Period: 9/8/22 - 10/6/22 Time: 37:22 AM      Certification Period: 9/8/22 - 12/7/22       NOTE TO PHYSICIAN:  PLEASE COMPLETE THE ORDERS BELOW AND FAX TO   [facility]: [phone]. If you are unable to process this request in 24 hours please contact our office: [phone].    ___ I have read the above report and request that my patient be discharged from therapy.      Physician Signature:        Date:       Time:

## 2022-11-16 ENCOUNTER — OFFICE VISIT (OUTPATIENT)
Dept: ORTHOPEDIC SURGERY | Age: 72
End: 2022-11-16
Payer: MEDICARE

## 2022-11-16 ENCOUNTER — HOSPITAL ENCOUNTER (OUTPATIENT)
Dept: PHYSICAL THERAPY | Age: 72
Discharge: HOME | End: 2022-11-16
Payer: MEDICARE

## 2022-11-16 DIAGNOSIS — Z96.652 STATUS POST LEFT KNEE REPLACEMENT: Primary | ICD-10-CM

## 2022-11-16 PROCEDURE — 97162 PT EVAL MOD COMPLEX 30 MIN: CPT

## 2022-11-16 PROCEDURE — 97110 THERAPEUTIC EXERCISES: CPT

## 2022-11-16 PROCEDURE — 97016 VASOPNEUMATIC DEVICE THERAPY: CPT

## 2022-11-16 PROCEDURE — 99024 POSTOP FOLLOW-UP VISIT: CPT | Performed by: NURSE PRACTITIONER

## 2022-11-16 NOTE — PROGRESS NOTES
Name: Brittney Germain    : 1950    Weight Loss Metrics 10/18/2022 10/17/2022 2022 2022 2022 2022 3/15/2022   Today's Wt 296 lb 296 lb 6.4 oz 299 lb 14.4 oz - 309 lb 299 lb 1.6 oz 290 lb   BMI 36.03 kg/m2 36.08 kg/m2 - 36.5 kg/m2 37.61 kg/m2 36.41 kg/m2 35.3 kg/m2       There is no height or weight on file to calculate BMI. Service Dept: [address] [facility]    Patient's Pharmacies:    [address] [facility] [id number] [address]  [address]  [place] [id number]  Phone: [phone] Fax: [phone] [place] [address]  Phone: [phone] Fax: [phone] [facility], [address] [place] [address]  Phone: [phone] Fax: [phone]       Chief Complaint   Patient presents with    Surgical Follow-up    Knee Pain       HPI:  Patient follows up today for reassessment of small amount of clear yellowish drainage from the scabbed area at the top of his incision. There were no vitals taken for this visit. No Known Allergies   Current Outpatient Medications   Medication Sig Dispense Refill    ondansetron (ZOFRAN ODT) 4 mg disintegrating tablet Take 1 Tablet by mouth every eight (8) hours as needed for Nausea, Vomiting or Nausea or Vomiting for up to 20 doses. 20 Tablet 0    neomycin-polymyxin-hydrocortisone (CORTISPORIN) otic solution 1 drop into affected eye      calcium carbonate (OS-CHARLOTTE) 500 mg calcium (1,250 mg) tablet 2 tablet      apixaban (ELIQUIS) 5 mg tablet Take 5 mg by mouth two (2) times a day.       acetaminophen 325 mg cap Tylenol 325 mg capsule   PRN      pantoprazole (PROTONIX) 40 mg tablet       finasteride (PROSCAR) 5 mg tablet TAKE 1 TABLET DAILY 90 Tab 3    carvedilol (COREG) 6.25 mg tablet Take  by mouth two (2) times daily (with meals). docusate sodium (COLACE) 100 mg capsule Take 100 mg by mouth. cholecalciferol (VITAMIN D3) 25 mcg (1,000 unit) cap Take 5,000 Units by mouth daily. ferrous sulfate 325 mg (65 mg iron) tablet Take  by mouth Daily (before breakfast). dilTIAZem CD (CARDIZEM CD) 240 mg ER capsule         Patient Active Problem List   Diagnosis Code    Prostate nodule N40.2    BPH with obstruction/lower urinary tract symptoms N40.1, N13.8    Recurrent UTI N39.0    OA (osteoarthritis) of knee M17.9    Post-operative complication N57. 9XXA    Chronic atrial fibrillation (HCC) I48.20    Chronic a-fib (HCC) I48.20    Cellulitis of right leg L03.115      History reviewed. No pertinent family history. Social History     Socioeconomic History    Marital status:    Tobacco Use    Smoking status: Never    Smokeless tobacco: Never   Vaping Use    Vaping Use: Never used   Substance and Sexual Activity    Alcohol use: No      Past Surgical History:   Procedure Laterality Date    HX PARATHYROIDECTOMY        Past Medical History:   Diagnosis Date    Chronic atrial fibrillation (HCC)     Chronic diarrhea     HLD (hyperlipidemia)     HTN (hypertension)     Hyperparathyroidism (HCC)     Impaired glucose tolerance     Obesity     Pleural effusion         I have reviewed and agree with [address] [place] [address] Nw and ROS and intake form in chart and the record furthermore I have reviewed prior medical record(s) regarding this patients care during this appointment. Physical Exam:  Physical exam today there is a small bit of dry and intact scab at the proximal end of the incision. There is no erythema and no active drainage at this time. Swelling is mild in the knee and patient demonstrates excellent range of motion from -5 to almost 105 degrees.     Encounter Diagnoses     ICD-10-CM ICD-9-CM   1. Status post left knee replacement  Z96.652 V43.65       As part of continued conservative pain management options the patient was advised to utilize Tylenol or OTC NSAIDS as long as it is not medically contraindicated. Return to Office:   I reassured the patient that the knee does appear to be healing well with no signs of infection. No antibiotics are needed at this time. He states that the drainage happens at random times not necessarily related to increased activity. He will use Band-Aids as needed but I feel that as soon as the scab has fully healed and fallen off then the drainage. As well. He will follow-up anytime as needed.          [address] [facility]

## 2022-11-16 NOTE — THERAPY EVALUATION
[address] [facility] [address], [address] [facility], [address] [place] [address]  PLAN OF CARE / STATEMENT OF MEDICAL NECESSITY FOR PHYSICAL THERAPY SERVICES  Patient Name: Aquilla Osler : 1950   Medical   Diagnosis: Pain in left knee [M25.562] Treatment Diagnosis: Left knee pain   Onset Date: 10/17/22     Referral Source: Ledya Narvaez MD Start of Care [facility]): 2022   Prior Hospitalization: See medical history Provider #: [id number]   Prior Level of Function: Independent with pain   Comorbidities: Atrial fibrillation, HLD, HTN, Hyperparathyroidism, Obesity, R TKA (22)   Medications: Verified on Patient Summary List   The Plan of Care and following information is based on the information from the initial evaluation.   ==========================================================================================  Assessment / Functional Analysis:    Pt is a 71y.o. year old  male who presents to outpatient clinic today s/p L TKA 10/17/22 ambulating without AD. Pt presents today with impairments that include decreased knee ROM bilaterally, left knee & hip weakness, left knee edema, decreased endurance & intermittent pain limiting function.  Pt will benefit from skilled PT intervention to target deficits in effort to maximize pain-free daily activity, restore PLOF and resume activities such as exercise.   ==========================================================================================  Eval Complexity: History: MEDIUM  Complexity : 1-2 comorbidities / personal factors will impact the outcome/ POC Exam:MEDIUM Complexity : 3 Standardized tests and measures addressing body structure, function, activity limitation and / or participation in recreation  Presentation: MEDIUM Complexity : Evolving with changing characteristics  Clinical Decision Making:MEDIUM Complexity : FOTO score of 26-74Overall Complexity:MEDIUM    Problem List: pain affecting function, decrease ROM, decrease strength, edema affecting function, impaired gait/ balance, decrease ADL/ functional abilitiies, decrease activity tolerance, and decrease flexibility/ joint mobility   Treatment Plan may include any combination of the following: Therapeutic exercise, Neuromuscular reeducation, Manual therapy, Therapeutic activity, Vasopneumatic device, and Gait training  Patient / Family readiness to learn indicated by: asking questions, trying to perform skills, and interest  Persons(s) to be included in education: patient (P)  Barriers to Learning/Limitations: None      Patient self reported health status: good  Rehabilitation Potential: good    Objective Measures:  Palpation: tenderness grossly along left knee, intact to light touch       ROM / Strength  [] Unable to assess                  AROM                         PROM                     Strength     Left Right Left Right Left Right   Hip Flexion     4+/5 5/5    Extension          Abduction          Adduction         Knee Flexion 102 105 105 115 4+/5 5/5    Extension -6 -5 -4  4+/5 5/5   Ankle Plantarflexion     5/5 5/5    Dorsiflexion     5/5 5/5     Patellar Mobility:     Hypermobile: [] Left   [] Right  Hypomobile: [x] Left   [] Right    Girth Measurements:     Midpatellar:               Left 51.0 cm     Right 49.5 cm                  . Gait:  [] Normal    [] Abnormal    [x] Antalgic    [] NWB    Device: none    Distance: > 500 feet  Comments: step negotiation via reciprocal pattern       Balance: Standing static: Good, Standing dynamic: Fair    Other tests/comments: FOTO: 47, LEFS: 32.2       Short Term Goals:  Pt will be independent with HEP to improve B knee ROM & strength in 3 weeks. Pt will improve B knee PROM to 0-120 degrees for improved ADL efficiency in 3 weeks. Pt ill improve FOTO score by 5 points for improved function in 3 weeks.     Long Term Goals:  Pt will improve L knee & hip strength to 5/5 for  improved ability to climb steps at home in 6 weeks. Pt will improve B knee AROM to 0-120 degrees for improved ability to bend & squat with household chores in 6 weeks. Pt will improve FOTO score >10 points for improved function & quality of life in 6 weeks. Pt will report no greater than 1-2/10 pain in L knee with daily activity in 6 weeks. Frequency / Duration: Patient to be seen  2  times per week for 6  weeks:  Patient / Caregiver education and instruction: exercises    Therapist Signature: Shelby Plascencia PT. DPT Date: 34/65/4161   Certification Period: 11/16/22 - 02/14/23 Time: 10:08 AM   ===========================================================================================  I certify that the above Physical Therapy Services are being furnished while the patient is under my care. I agree with the treatment plan and certify that this therapy is necessary. Physician Signature:        Date:       Time:     Please sign and return to [facility] or you may fax the signed copy to [phone]. Please call [phone] if more information required. Thank you.

## 2022-11-16 NOTE — THERAPY EVALUATION
KNEE EVAL/ PT DAILY TREATMENT NOTE 10-18    Patient Name: Elisabeth Claudio  Date:2022  : 1950  [x]  Patient  Verified  Payor: [place] MEDICARE / Plan: VA MEDICARE PART A & B / Product Type: Medicare /    In time:1008  Out time:1103  Total Treatment Time (min): 54  Visit #: 1    Medicare/BCBS Only   Total Timed Codes (min):  15 1:1 Treatment Time:  40     Treatment Area: Pain in left knee [M25.562]    SUBJECTIVE  Pt is s/p L TKA 10/17/22 , enters today without AD. Pt has been unable to begin physical therapy after surgery related to bleeding complications and cellulitis post-op. Pt states that he was seen by VERONICA Garcia this morning due to concerns of incision healing. Pt states that he is motivated to get better and get back to exercising. Pt is independent with all chores at home, driving himself and ADLs. Pt reports most difficulty with going up/down steps.        Pt. Goals: \"get back to begin able to walk 20 minutes or more for exercise, get back in the gym\"    Pain Level (0-10 scale): Current :3/10     Worst: 6/10  []constant []intermittent [x]improving []worsening []no change since onset      Past Medical History:   Diagnosis Date    Chronic atrial fibrillation (HCC)     Chronic diarrhea     HLD (hyperlipidemia)     HTN (hypertension)     Hyperparathyroidism (HCC)     Impaired glucose tolerance     Obesity     Pleural effusion      Past Surgical History:   Procedure Laterality Date    HX PARATHYROIDECTOMY       Any medication changes, allergies to medications, adverse drug reactions, diagnosis change, or new procedure performed?: [x] No    [] Yes (see summary sheet for update)          OBJECTIVE/EXAMINATION  Palpation: tenderness grossly along left knee, intact to light touch       ROM / Strength  [] Unable to assess                  AROM                         PROM                     Strength     Left Right Left Right Left Right   Hip Flexion     4+/5 5/5    Extension          Abduction Adduction         Knee Flexion 102 105 105 115 4+/5 5/5    Extension -6 -5 -4  4+/5 5/5   Ankle Plantarflexion     5/5 5/5    Dorsiflexion     5/5 5/5     Patellar Mobility:     Hypermobile: [] Left   [] Right  Hypomobile: [x] Left   [] Right    Girth Measurements:     Midpatellar:               Left 51.0 cm     Right 49.5 cm                  .     Gait:  [] Normal    [] Abnormal    [x] Antalgic    [] NWB    Device: none    Distance: > 500 feet  Comments: step negotiation via reciprocal pattern       Balance: Standing static: Good, Standing dynamic: Fair    Other tests/comments: FOTO: 47, LEFS: 32.2       Modality rationale: decrease edema and decrease pain to improve the patients ability to move/heal optimally    Min Type Additional Details    [] Estim:  []Unatt       []IFC  []Premod                        []Other:  []w/ice   []w/heat  Position:  Location:    [] Estim: []Att    []TENS instruct  []NMES                    []Other:  []w/US   []w/ice   []w/heat  Position:  Location:         []  Ultrasound: []Continuous   [] Pulsed                           []1MHz   []3MHz Location:  W/cm2:         []  Ice     []  heat  []  Ice massage  []  Laser   []  Anodyne Position:  Location:        10 [x]  Vasopneumatic Device Pressure:       [x] lo [] med [] hi   Temperature: [x] lo [] med [] hi       30 min [x]Eval                  []Re-Eval       15 min Therapeutic Exercise:  [x] See flow sheet :   Rationale: increase ROM and increase strength to improve the patients ability to restore PLOF            With   [x] TE   [] TA   [] neuro   [] other: Patient Education: [x] Review HEP    [] Progressed/Changed HEP based on:   [x] positioning   [x] body mechanics   [] transfers   [x] heat/ice application    [] other:        Pain Level (0-10 scale) post treatment: 0/10      ASSESSMENT/Functional Analysis     [x]  See plan of care  []  Discharge due to:_  []  Other:_      Dwayne Domínguez PT, DPT 11/16/2022  10:07 AM

## 2022-11-18 ENCOUNTER — APPOINTMENT (OUTPATIENT)
Dept: PHYSICAL THERAPY | Age: 72
End: 2022-11-18
Payer: MEDICARE

## 2022-11-21 ENCOUNTER — HOSPITAL ENCOUNTER (OUTPATIENT)
Dept: PHYSICAL THERAPY | Age: 72
Discharge: HOME | End: 2022-11-21
Payer: MEDICARE

## 2022-11-21 ENCOUNTER — TELEPHONE (OUTPATIENT)
Dept: ORTHOPEDIC SURGERY | Age: 72
End: 2022-11-21

## 2022-11-21 DIAGNOSIS — Z96.652 STATUS POST LEFT KNEE REPLACEMENT: Primary | ICD-10-CM

## 2022-11-21 PROCEDURE — 97110 THERAPEUTIC EXERCISES: CPT

## 2022-11-21 PROCEDURE — 97016 VASOPNEUMATIC DEVICE THERAPY: CPT

## 2022-11-21 RX ORDER — HYDROCODONE BITARTRATE AND ACETAMINOPHEN 5; 325 MG/1; MG/1
1 TABLET ORAL
Qty: 30 TABLET | Refills: 0 | Status: SHIPPED | OUTPATIENT
Start: 2022-11-21 | End: 2022-12-01

## 2022-11-21 NOTE — PROGRESS NOTES
PT DAILY TREATMENT NOTE 8-    Patient Name: Geoff Cramer  Date:2022  : 1950  [x]  Patient  Verified  Payor: VA MEDICARE / Plan: VA MEDICARE PART A & B / Product Type: Medicare /    In time:1104 Out time:1150  Total Treatment Time (min):  46  Total Timed Codes (min): 46  1:1 Treatment Time (min): 46    Treatment Area: Pain in left knee [M25.562]    SUBJECTIVE  Pain Level (0-10 scale): 010  Any medication changes, allergies to medications, adverse drug reactions, diagnosis change, or new procedure performed?: [x] No    [] Yes (see summary sheet for update)  Subjective functional status/changes:   [] No changes reported  Reported having minimal pain just increased swelling. OBJECTIVE  Modality rationale: decrease edema and decrease pain to improve the patients ability to CP/Vaso post session to decrease pain and edema from exercises.       Min Type Additional Details    [] Estim: []Att   []Unatt  []TENS instruct                 []IFC  []Premod []NMES                       []Other:  []w/US   []w/ice   []w/heat  Position:  Location:    []  Traction: [] Cervical       []Lumbar                       [] Prone          []Supine                       []Intermittent   []Continuous Lbs:  [] before manual  [] after manual    []  Ultrasound: []Continuous   [] Pulsed                           []1MHz   []3MHz Location:  W/cm2:    []  Iontophoresis with dexamethasone         Location: [] Take home patch   [] In clinic    []  Ice     []  heat  []  Ice massage Position:  Location:   10 [x]  Vasopneumatic Device Pressure: [x] lo [] med [] hi   Temp: [x] lo [] med [] hi   [x] Skin assessment post-treatment:  [x]intact [x]redness- no adverse reaction       []redness - adverse reaction:       36 min Therapeutic Exercise:  [x] See flow sheet :   Rationale: increase ROM and increase strength to improve the patients ability to return to PLOF with reduced pain, achieving optimal strength and function to perform household chores and daily activities. 0 min Gait Training:  ___ feet with ___ device on level surfaces with ___ level of assist   Rationale:           min Patient Education: [x] Review HEP    [] Progressed/Changed HEP based on:   [] positioning   [] body mechanics   [] transfers   [] heat/ice application        Pain Level (0-10 scale) post treatment: 2/10    ASSESSMENT/Changes in Function: Began session on Bike to increase ROM and improve strength. Initiated HEP and TE per written flow sheet. Provided Pt with demonstration and VC to ensure improved technique. Pt reported good understanding of all exercise due to recent TKA R LE. Patient will continue to benefit from skilled PT services to modify and progress therapeutic interventions, address ROM deficits, address strength deficits, analyze and address soft tissue restrictions, analyze and cue movement patterns, and analyze and modify body mechanics/ergonomics to attain remaining goals. [x]  See Plan of Care  []  See progress note/recertification  []  See Discharge Summary         Progress towards goals / Updated goals:   Will Continue to work on strength and ROM per POC     PLAN  []  Upgrade activities as tolerated     [x]  Continue plan of care  []  Update interventions per flow sheet       []  Discharge due to:_  []  Other:_      Anila Dueñas 11/21/2022  11:54 AM

## 2022-11-23 ENCOUNTER — HOSPITAL ENCOUNTER (OUTPATIENT)
Dept: PHYSICAL THERAPY | Age: 72
Discharge: HOME | End: 2022-11-23
Payer: MEDICARE

## 2022-11-23 PROCEDURE — 97016 VASOPNEUMATIC DEVICE THERAPY: CPT

## 2022-11-23 PROCEDURE — 97110 THERAPEUTIC EXERCISES: CPT

## 2022-11-23 NOTE — PROGRESS NOTES
PT DAILY TREATMENT NOTE 8    Patient Name: Deysi Feliz  Date:2022  : 1950  [x]  Patient  Verified  Payor: VA MEDICARE / Plan: VA MEDICARE PART A & B / Product Type: Medicare /    In time:09 Out time:953  Total Treatment Time (min):  53  Total Timed Codes (min): 43  1:1 Treatment Time (min): 43    Treatment Area: Pain in left knee [M25.562]    SUBJECTIVE  Pain Level (0-10 scale): 010  Any medication changes, allergies to medications, adverse drug reactions, diagnosis change, or new procedure performed?: [x] No    [] Yes (see summary sheet for update)  Subjective functional status/changes:   [] No changes reported  Reported having minimal pain just increased swelling. OBJECTIVE  Modality rationale: decrease edema and decrease pain to improve the patients ability to CP/Vaso post session to decrease pain and edema from exercises.       Min Type Additional Details    [] Estim: []Att   []Unatt  []TENS instruct                 []IFC  []Premod []NMES                       []Other:  []w/US   []w/ice   []w/heat  Position:  Location:    []  Traction: [] Cervical       []Lumbar                       [] Prone          []Supine                       []Intermittent   []Continuous Lbs:  [] before manual  [] after manual    []  Ultrasound: []Continuous   [] Pulsed                           []1MHz   []3MHz Location:  W/cm2:    []  Iontophoresis with dexamethasone         Location: [] Take home patch   [] In clinic    []  Ice     []  heat  []  Ice massage Position:  Location:   10 [x]  Vasopneumatic Device Pressure: [x] lo [] med [] hi   Temp: [x] lo [] med [] hi   [x] Skin assessment post-treatment:  [x]intact [x]redness- no adverse reaction       []redness - adverse reaction:       43 min Therapeutic Exercise:  [x] See flow sheet :   Rationale: increase ROM and increase strength to improve the patients ability to return to PLOF with reduced pain, achieving optimal strength and function to perform household chores and daily activities. 0 min Gait Training:  ___ feet with ___ device on level surfaces with ___ level of assist   Rationale:           min Patient Education: [x] Review HEP    [] Progressed/Changed HEP based on:   [] positioning   [] body mechanics   [] transfers   [] heat/ice application        Pain Level (0-10 scale) post treatment: 2/10    ASSESSMENT/Changes in Function: Began session on Bike to increase ROM and improve strength. Continued with TE per written flow sheet. Pt was able to perform increased reps and resistance with exercise without adverse reactions. Provided Pt with demonstration and VC to ensure improved technique. Pt reported good understanding of all exercise due to recent TKA R LE. Patient will continue to benefit from skilled PT services to modify and progress therapeutic interventions, address ROM deficits, address strength deficits, analyze and address soft tissue restrictions, analyze and cue movement patterns, and analyze and modify body mechanics/ergonomics to attain remaining goals. [x]  See Plan of Care  []  See progress note/recertification  []  See Discharge Summary         Progress towards goals / Updated goals:   Will Continue to work on strength and ROM per POC     PLAN  []  Upgrade activities as tolerated     [x]  Continue plan of care  []  Update interventions per flow sheet       []  Discharge due to:_  []  Other:_      Onsted Menominee 11/23/2022  11:54 AM

## 2022-11-29 ENCOUNTER — HOSPITAL ENCOUNTER (OUTPATIENT)
Dept: PHYSICAL THERAPY | Age: 72
End: 2022-11-29
Payer: MEDICARE

## 2022-11-29 PROCEDURE — 97110 THERAPEUTIC EXERCISES: CPT

## 2022-11-29 PROCEDURE — 97016 VASOPNEUMATIC DEVICE THERAPY: CPT

## 2022-11-29 NOTE — PROGRESS NOTES
PT DAILY TREATMENT NOTE     Patient Name: Jessica Postal  Date:2022  : 1950  [x]  Patient  Verified  Payor: VA MEDICARE / Plan: VA MEDICARE PART A & B / Product Type: Medicare /    In VPRP:[id number]  Out time:1012  Total Treatment Time (min): 47  Total Timed Codes (min): 37  1:1 Treatment Time (min): 35   Visit #: 4     Treatment Area: Pain in left knee [M25.562]    SUBJECTIVE  Pt reports stiffness, tightness and soreness in L knee. He states compliance with HEP. Pain Level (0-10 scale): 0    Any medication changes, allergies to medications, adverse drug reactions, diagnosis change, or new procedure performed?: [x] No    [] Yes (see summary sheet for update)    OBJECTIVE  Modality rationale: decrease edema, decrease inflammation, decrease pain, and increase tissue extensibility to improve the patients ability to optimally heal.   Min Type Additional Details    [] Estim: []Att   []Unatt  []TENS instruct                 []IFC  []Premod []NMES                       []Other:  []w/US   []w/ice   []w/heat  Position:  Location:    []  Traction: [] Cervical       []Lumbar                       [] Prone          []Supine                       []Intermittent   []Continuous Lbs:  [] before manual  [] after manual    []  Ultrasound: []Continuous   [] Pulsed                           []1MHz   []3MHz Location:  W/cm2:    []  Ice     []  heat  []  Ice massage Position:  Location:   10 [x]  Vasopneumatic Device Pressure: [x] lo [] med [] hi   Temp: [] lo [x] med [] hi   [] Skin assessment post-treatment:  []intact []redness- no adverse reaction       []redness - adverse reaction:     35 min Therapeutic Exercise:  [x] See flow sheet :   Rationale: increase ROM, increase strength, improve coordination, improve balance, and increase proprioception to improve the patients ability to achieve full AROM and strength allowing for ease with daily activities.       With TE  TA   NR  GT   Misc Patient Education: [x] Review HEP    [] Progressed/Changed HEP based on:   [] positioning   [] body mechanics   [] transfers   [] heat/ice application        Pain Level (0-10 scale) post treatment: 0    ASSESSMENT/Changes in Function: Session began with recumbent bicycle to increase knee mobility and muscular endurance. Continued with CKC and OKC exercises to increase quadriceps strength and endurance. Pt is progressing well through TKA protocol showing improved activity tolerance compared to previous visits. Plan to continue POC address range of motion deficits next visit. Patient will continue to benefit from skilled PT services to modify and progress therapeutic interventions, address functional mobility deficits, address ROM deficits, address strength deficits, analyze and address soft tissue restrictions, analyze and cue movement patterns, analyze and modify body mechanics/ergonomics, assess and modify postural abnormalities, and address imbalance/dizziness to attain remaining goals.      [x]  See Plan of Care  []  See progress note/recertification  []  See Discharge Summary         PLAN  []  Upgrade activities as tolerated     [x]  Continue plan of care  []  Update interventions per flow sheet       []  Discharge due to:_  []  Other:    Mickey Purvis  11/29/2022  9:41 AM

## 2022-12-01 ENCOUNTER — APPOINTMENT (OUTPATIENT)
Dept: PHYSICAL THERAPY | Age: 72
End: 2022-12-01
Payer: MEDICARE

## 2022-12-06 ENCOUNTER — HOSPITAL ENCOUNTER (OUTPATIENT)
Dept: PHYSICAL THERAPY | Age: 72
Discharge: HOME | End: 2022-12-06
Payer: MEDICARE

## 2022-12-06 PROCEDURE — 97016 VASOPNEUMATIC DEVICE THERAPY: CPT

## 2022-12-06 PROCEDURE — 97110 THERAPEUTIC EXERCISES: CPT

## 2022-12-06 NOTE — PROGRESS NOTES
PT DAILY TREATMENT NOTE     Patient Name: Edelmira Crane  Date:2022  : 1950  [x]  Patient  Verified  Payor: [employer] / Plan: [place] MEDIC[place] A & B / Product Type: Medicare /    In time:0900  Out time: 0950  Total Treatment Time (min): 50  Total Timed Codes (min): 40  1:1 Treatment Time (min): 40  Visit #: 5    Treatment Area: Pain in left knee [M25.562]    SUBJECTIVE  Pt reports stiffness, tightness and soreness in L knee. He states compliance with HEP. \"I want to not do the squats as I feel like I had hurt myself last time. \"    Pain Level (0-10 scale): 1    Any medication changes, allergies to medications, adverse drug reactions, diagnosis change, or new procedure performed?: [x] No    [] Yes (see summary sheet for update)    OBJECTIVE  Modality rationale: decrease edema, decrease inflammation, decrease pain, and increase tissue extensibility to improve the patients ability to optimally heal.   Min Type Additional Details    [] Estim: []Att   []Unatt  []TENS instruct                 []IFC  []Premod []NMES                       []Other:  []w/US   []w/ice   []w/heat  Position:  Location:    []  Traction: [] Cervical       []Lumbar                       [] Prone          []Supine                       []Intermittent   []Continuous Lbs:  [] before manual  [] after manual    []  Ultrasound: []Continuous   [] Pulsed                           []1MHz   []3MHz Location:  W/cm2:    []  Ice     []  heat  []  Ice massage Position:  Location:   10 [x]  Vasopneumatic Device Pressure: [x] lo [] med [] hi   Temp: [] lo [x] med [] hi   [] Skin assessment post-treatment:  []intact []redness- no adverse reaction       []redness - adverse reaction:     40 min Therapeutic Exercise:  [x] See flow sheet :   Rationale: increase ROM, increase strength, improve coordination, improve balance, and increase proprioception to improve the patients ability to achieve full AROM and strength allowing for ease with daily activities. With TE  TA   NR  GT   Misc Patient Education: [x] Review HEP    [] Progressed/Changed HEP based on:   [] positioning   [] body mechanics   [] transfers   [] heat/ice application        Pain Level (0-10 scale) post treatment: 0    ASSESSMENT/Changes in Function: Session began with recumbent bicycle to increase knee mobility and muscular endurance. Continued with CKC and OKC exercises to increase quadriceps strength and endurance. Introduced SLS for balance and strength. Pt is progressing well through TKA protocol showing improved activity tolerance compared to previous visits. AROM of -5-100 degrees and PROM -1-103 degrees. Plan to continue POC address range of motion deficits next visit. Patient will continue to benefit from skilled PT services to modify and progress therapeutic interventions, address functional mobility deficits, address ROM deficits, address strength deficits, analyze and address soft tissue restrictions, analyze and cue movement patterns, analyze and modify body mechanics/ergonomics, assess and modify postural abnormalities, and address imbalance/dizziness to attain remaining goals.      [x]  See Plan of Care  []  See progress note/recertification  []  See Discharge Summary         PLAN  []  Upgrade activities as tolerated     [x]  Continue plan of care  []  Update interventions per flow sheet       []  Discharge due to:_  []  Other:    SURJIT Fonseca  12/6/2022  9:52 AM

## 2022-12-08 ENCOUNTER — HOSPITAL ENCOUNTER (OUTPATIENT)
Dept: PHYSICAL THERAPY | Age: 72
Discharge: HOME | End: 2022-12-08
Payer: MEDICARE

## 2022-12-08 PROCEDURE — 97110 THERAPEUTIC EXERCISES: CPT

## 2022-12-08 NOTE — PROGRESS NOTES
PT DAILY TREATMENT NOTE     Patient Name: Geoffrey Mdaison  Date:2022  : 1950  [x]  Patient  Verified  Payor: VA MEDICARE / Plan: [place] MEDICARE PART A & B / Product Type: Medicare /    In NHHF:1351  Out time:1008  Total Treatment Time (min): 40  Total Timed Codes (min): 40  1:1 Treatment Time (min): 40   Visit #: 6    Treatment Area: Pain in left knee [M25.562]    SUBJECTIVE  Chief complaint is stiffness in L knee. Pain Level (0-10 scale): 0    Any medication changes, allergies to medications, adverse drug reactions, diagnosis change, or new procedure performed?: [x] No    [] Yes (see summary sheet for update)    OBJECTIVE  Modality rationale: Declined   Min Type Additional Details    [] Estim: []Att   []Unatt  []TENS instruct                 []IFC  []Premod []NMES                       []Other:  []w/US   []w/ice   []w/heat  Position:  Location:    []  Traction: [] Cervical       []Lumbar                       [] Prone          []Supine                       []Intermittent   []Continuous Lbs:  [] before manual  [] after manual    []  Ultrasound: []Continuous   [] Pulsed                           []1MHz   []3MHz Location:  W/cm2:    []  Ice     []  heat  []  Ice massage Position:  Location:    []  Vasopneumatic Device Pressure: [] lo [] med [] hi   Temp: [] lo [] med [] hi   [] Skin assessment post-treatment:  []intact []redness- no adverse reaction       []redness - adverse reaction:     40 min Therapeutic Exercise:  [x] See flow sheet :   Rationale: increase ROM, increase strength, improve coordination, improve balance, and increase proprioception to improve the patients ability to return to PLOF with full AROM and strength.       With TE  TA   NR  GT   Misc Patient Education: [x] Review HEP    [] Progressed/Changed HEP based on:   [] positioning   [] body mechanics   [] transfers   [] heat/ice application        Pain Level (0-10 scale) post treatment: 0    ASSESSMENT/Changes in Function: Exercises completed per flowsheet to address range of motion an strength deficits. Mini squats held due to patient fear of overexertion. All other exercises completed without issue. Plan to continue POC progressing as able. Patient will continue to benefit from skilled PT services to modify and progress therapeutic interventions, address functional mobility deficits, address ROM deficits, address strength deficits, analyze and address soft tissue restrictions, analyze and cue movement patterns, analyze and modify body mechanics/ergonomics, assess and modify postural abnormalities, and address imbalance/dizziness to attain remaining goals.      [x]  See Plan of Care  []  See progress note/recertification  []  See Discharge Summary         PLAN  []  Upgrade activities as tolerated     [x]  Continue plan of care  []  Update interventions per flow sheet       []  Discharge due to:_  []  Other:    Mickey Garcia  12/8/2022  11:10 AM

## 2022-12-13 ENCOUNTER — HOSPITAL ENCOUNTER (OUTPATIENT)
Dept: PHYSICAL THERAPY | Age: 72
Discharge: HOME | End: 2022-12-13
Payer: MEDICARE

## 2022-12-13 PROCEDURE — 97110 THERAPEUTIC EXERCISES: CPT

## 2022-12-13 NOTE — PROGRESS NOTES
PT DAILY TREATMENT NOTE     Patient Name: Dipak Kirby  Date:2022  : 1950  [x]  Patient  Verified  Payor: VA MEDICARE / Plan: [place] MEDICARE PART A & B / Product Type: Medicare /    In time:09  Out time:941  Total Treatment Time (min): 41  Total Timed Codes (min): 41  1:1 Treatment Time (min): 41   Visit #: 7     Treatment Area: Pain in left knee [M25.562]    SUBJECTIVE  Pt reports stiffness and tightness in L knee. Pain Level (0-10 scale): 0    Any medication changes, allergies to medications, adverse drug reactions, diagnosis change, or new procedure performed?: [x] No    [] Yes (see summary sheet for update)    OBJECTIVE  Modality rationale: Declined   Min Type Additional Details    [] Estim: []Att   []Unatt  []TENS instruct                 []IFC  []Premod []NMES                       []Other:  []w/US   []w/ice   []w/heat  Position:  Location:    []  Traction: [] Cervical       []Lumbar                       [] Prone          []Supine                       []Intermittent   []Continuous Lbs:  [] before manual  [] after manual    []  Ultrasound: []Continuous   [] Pulsed                           []1MHz   []3MHz Location:  W/cm2:    []  Ice     []  heat  []  Ice massage Position:  Location:    []  Vasopneumatic Device Pressure: [] lo [] med [] hi   Temp: [] lo [] med [] hi   [] Skin assessment post-treatment:  []intact []redness- no adverse reaction       []redness - adverse reaction:     41 min Therapeutic Exercise:  [] See flow sheet :   Rationale: increase ROM, increase strength, improve coordination, improve balance, and increase proprioception to improve the patients ability to achieve full AROM and strength allowing for ease with functional activities.             With TE  TA   NR  GT   Misc Patient Education: [x] Review HEP    [] Progressed/Changed HEP based on:   [] positioning   [] body mechanics   [] transfers   [] heat/ice application        Pain Level (0-10 scale) post treatment: 0    ASSESSMENT/Changes in Function: Exercises completed per flowsheet working to achieve long term goals. Resistance increased as able with no adverse effects. Introduced leg press to increase stability and strength. Pt plan to be discharged in the upcoming visits. Patient will continue to benefit from skilled PT services to modify and progress therapeutic interventions, address functional mobility deficits, address ROM deficits, address strength deficits, analyze and address soft tissue restrictions, analyze and cue movement patterns, analyze and modify body mechanics/ergonomics, assess and modify postural abnormalities, and address imbalance/dizziness to attain remaining goals.      [x]  See Plan of Care  []  See progress note/recertification  []  See Discharge Summary         PLAN  []  Upgrade activities as tolerated     [x]  Continue plan of care  []  Update interventions per flow sheet       []  Discharge due to:_  []  Other:    Mickey South  12/13/2022  9:47 AM

## 2022-12-15 ENCOUNTER — APPOINTMENT (OUTPATIENT)
Dept: PHYSICAL THERAPY | Age: 72
End: 2022-12-15
Payer: MEDICARE

## 2022-12-16 ENCOUNTER — APPOINTMENT (OUTPATIENT)
Dept: PHYSICAL THERAPY | Age: 72
End: 2022-12-16
Payer: MEDICARE

## 2022-12-19 ENCOUNTER — APPOINTMENT (OUTPATIENT)
Dept: PHYSICAL THERAPY | Age: 72
End: 2022-12-19
Payer: MEDICARE

## 2022-12-19 ENCOUNTER — TELEPHONE (OUTPATIENT)
Dept: ORTHOPEDIC SURGERY | Age: 72
End: 2022-12-19

## 2022-12-19 DIAGNOSIS — Z96.652 STATUS POST LEFT KNEE REPLACEMENT: Primary | ICD-10-CM

## 2022-12-19 RX ORDER — HYDROCODONE BITARTRATE AND ACETAMINOPHEN 5; 325 MG/1; MG/1
1 TABLET ORAL
Qty: 30 TABLET | Refills: 0 | Status: SHIPPED | OUTPATIENT
Start: 2022-12-19 | End: 2022-12-29

## 2022-12-19 NOTE — TELEPHONE ENCOUNTER
Patient had a lt tkr on 10/17/22. Patients physical therapy was extended and Patient would like to know if one more prescription of the April Lesser could be filled so he can get through the PT.     Pharmacy: [facility] in [place], [address] [place] [address]

## 2022-12-20 ENCOUNTER — HOSPITAL ENCOUNTER (OUTPATIENT)
Dept: PHYSICAL THERAPY | Age: 72
Discharge: HOME | End: 2022-12-20
Payer: MEDICARE

## 2022-12-20 PROCEDURE — 97110 THERAPEUTIC EXERCISES: CPT

## 2022-12-20 PROCEDURE — 97016 VASOPNEUMATIC DEVICE THERAPY: CPT

## 2022-12-20 NOTE — PROGRESS NOTES
PT DAILY TREATMENT NOTE 8    Patient Name: Shakir Magaña  Date:2022  : 1950  [x]  Patient  Verified  Payor: VA MEDICARE / Plan: VA MEDICARE PART A & B / Product Type: Medicare /    In HRDP:[id number]  Out ZNIL:[id number]  Total Treatment Time (min): 54  Total Timed Codes (min): 44  1:1 Treatment Time (min): 44   Visit #: 8     Treatment Area: Pain in left knee [M25.562]    SUBJECTIVE  Pt reports stiffness and tightness in L knee. Pain Level (0-10 scale): 0    Any medication changes, allergies to medications, adverse drug reactions, diagnosis change, or new procedure performed?: [x] No    [] Yes (see summary sheet for update)    OBJECTIVE  Modality rationale: decrease edema, decrease inflammation, and decrease pain to improve the patients ability to optimally heal.   Min Type Additional Details    [] Estim: []Att   []Unatt  []TENS instruct                 []IFC  []Premod []NMES                       []Other:  []w/US   []w/ice   []w/heat  Position:  Location:    []  Traction: [] Cervical       []Lumbar                       [] Prone          []Supine                       []Intermittent   []Continuous Lbs:  [] before manual  [] after manual    []  Ultrasound: []Continuous   [] Pulsed                           []1MHz   []3MHz Location:  W/cm2:    []  Ice     []  heat  []  Ice massage Position:  Location:   10 [x]  Vasopneumatic Device Pressure: [x] lo [] med [] hi   Temp: [] lo [x] med [] hi   [] Skin assessment post-treatment:  []intact []redness- no adverse reaction       []redness - adverse reaction:     42 min Therapeutic Exercise:  [x] See flow sheet :   Rationale: increase ROM, increase strength, improve coordination, improve balance, and increase proprioception to improve the patients ability to restore function and mobility allowing for pain free movement.             With TE  TA   NR  GT   Misc Patient Education: [x] Review HEP    [] Progressed/Changed HEP based on:   [] positioning   [] body mechanics   [] transfers   [] heat/ice application        Pain Level (0-10 scale) post treatment: 0    ASSESSMENT/Changes in Function: Session began with an active warm up via stationary bicycle. B LE stretches followed. Pt is progressing well through TKA protocol showing only a slight extension deficit . Exercises completed working to increase quadriceps strength, endurance, and stability. Revisited leg press with extension to achieve full AROM. Plan to continue POC as tolerated. Patient will continue to benefit from skilled PT services to modify and progress therapeutic interventions, address functional mobility deficits, address ROM deficits, address strength deficits, analyze and address soft tissue restrictions, analyze and cue movement patterns, analyze and modify body mechanics/ergonomics, assess and modify postural abnormalities, and address imbalance/dizziness to attain remaining goals.      [x]  See Plan of Care  []  See progress note/recertification  []  See Discharge Summary         PLAN  []  Upgrade activities as tolerated     [x]  Continue plan of care  []  Update interventions per flow sheet       []  Discharge due to:_  []  Other:    Mickey George  12/20/2022  9:45 AM

## 2022-12-22 ENCOUNTER — HOSPITAL ENCOUNTER (OUTPATIENT)
Dept: PHYSICAL THERAPY | Age: 72
Discharge: HOME | End: 2022-12-22
Payer: MEDICARE

## 2022-12-22 PROCEDURE — 97110 THERAPEUTIC EXERCISES: CPT

## 2022-12-22 PROCEDURE — 97016 VASOPNEUMATIC DEVICE THERAPY: CPT

## 2022-12-22 NOTE — PROGRESS NOTES
PT DAILY TREATMENT NOTE 8-14    Patient Name: Jose Mclean  Date:2022  : 1950  [x]  Patient  Verified  Payor: VA MEDICARE / Plan: VA MEDICARE PART A & B / Product Type: Medicare /    In time:856  Out time:959  Total Treatment Time (min): 63  Total Timed Codes (min): 53  1:1 Treatment Time (min): 453  Visit #: 9    Treatment Area: Pain in left knee [M25.562]    SUBJECTIVE  Pt reports stiffness and tightness in L knee. Pain Level (0-10 scale): 0    Any medication changes, allergies to medications, adverse drug reactions, diagnosis change, or new procedure performed?: [x] No    [] Yes (see summary sheet for update)    OBJECTIVE  Modality rationale: decrease edema, decrease inflammation, and decrease pain to improve the patients ability to optimally heal.   Min Type Additional Details    [] Estim: []Att   []Unatt  []TENS instruct                 []IFC  []Premod []NMES                       []Other:  []w/US   []w/ice   []w/heat  Position:  Location:    []  Traction: [] Cervical       []Lumbar                       [] Prone          []Supine                       []Intermittent   []Continuous Lbs:  [] before manual  [] after manual    []  Ultrasound: []Continuous   [] Pulsed                           []1MHz   []3MHz Location:  W/cm2:    []  Ice     []  heat  []  Ice massage Position:  Location:   10 [x]  Vasopneumatic Device Pressure: [x] lo [] med [] hi   Temp: [] lo [x] med [] hi   [] Skin assessment post-treatment:  []intact []redness- no adverse reaction       []redness - adverse reaction:     53 min Therapeutic Exercise:  [x] See flow sheet :   Rationale: increase ROM, increase strength, improve coordination, improve balance, and increase proprioception to improve the patients ability to restore function and mobility allowing for pain free movement.             With TE  TA   NR  GT   Misc Patient Education: [x] Review HEP    [] Progressed/Changed HEP based on:   [] positioning   [] body mechanics   [] transfers   [] heat/ice application        Pain Level (0-10 scale) post treatment: 0    ASSESSMENT/Changes in Function: Session began with an active warm up via stationary bicycle. B LE stretches followed. Pt is progressing well through TKA protocol showing only a slight extension deficit . Exercises completed working to increase quadriceps strength, endurance, and stability. Continued with  leg press with extension to achieve full AROM. Plan to continue POC as tolerated. Patient will continue to benefit from skilled PT services to modify and progress therapeutic interventions, address functional mobility deficits, address ROM deficits, address strength deficits, analyze and address soft tissue restrictions, analyze and cue movement patterns, analyze and modify body mechanics/ergonomics, assess and modify postural abnormalities, and address imbalance/dizziness to attain remaining goals.      [x]  See Plan of Care  []  See progress note/recertification  []  See Discharge Summary         PLAN  []  Upgrade activities as tolerated     [x]  Continue plan of care  []  Update interventions per flow sheet       []  Discharge due to:_  []  Other:      SURJIT Oneil  12/22/2022  12:16  PM

## 2022-12-27 ENCOUNTER — APPOINTMENT (OUTPATIENT)
Dept: PHYSICAL THERAPY | Age: 72
End: 2022-12-27
Payer: MEDICARE

## 2022-12-29 ENCOUNTER — HOSPITAL ENCOUNTER (OUTPATIENT)
Dept: PHYSICAL THERAPY | Age: 72
End: 2022-12-29
Payer: MEDICARE

## 2022-12-29 PROCEDURE — 97110 THERAPEUTIC EXERCISES: CPT

## 2022-12-29 PROCEDURE — 97164 PT RE-EVAL EST PLAN CARE: CPT

## 2022-12-29 NOTE — PROGRESS NOTES
PT DAILY TREATMENT NOTE     Patient Name: Zainab Herrera  Date:2022  : 1950  [x]  Patient  Verified  Payor: VA MEDICARE / Plan: VA MEDICARE PART A & B / Product Type: Medicare /    In time: 9286  Out time: 851  Total Treatment Time (min): 50  Total Timed Codes (min): 35  1:1 Treatment Time (min): 50   Visit # 10      Treatment Area: Pain in left knee [M25.562]    SUBJECTIVE  Pt reports no knee pain today but notes that the knee feels stiff. He reports compliance with HEP.   Pain Level (0-10 scale): 0/10  Any medication changes, allergies to medications, adverse drug reactions, diagnosis change, or new procedure performed?: [x] No    [] Yes (see summary sheet for update)        OBJECTIVE  Modality rationale:    Min Type Additional Details    [] Estim: []Att   []Unatt  []TENS instruct                 []IFC  []Premod []NMES                       []Other:  []w/US   []w/ice   []w/heat  Position:  Location:    []  Traction: [] Cervical       []Lumbar                       [] Prone          []Supine                       []Intermittent   []Continuous Lbs:  [] before manual  [] after manual    []  Ultrasound: []Continuous   [] Pulsed                           []1MHz   []3MHz Location:  W/cm2:         []  Ice     []  heat  []  Ice massage Position:  Location:    []  Vasopneumatic Device Pressure: [] lo [] med [] hi   Temp: [] lo [] med [] hi   [] Skin assessment post-treatment:  []intact []redness- no adverse reaction       []redness - adverse reaction:       35 min Therapeutic Exercise:  [] See flow sheet :   Rationale: increase ROM and increase strength to improve the patients ability to ambulate    15 min PT Reassessment              With TE Patient Education: [x] Review HEP    [] Progressed/Changed HEP based on:   [] positioning   [] body mechanics   [] transfers   [] heat/ice application          Pain Level (0-10 scale) post treatment: 0/10    ASSESSMENT/Changes in Function:   Pt participates in PT reassessment today to assess pt progress in therapy this reporting period. Session initiated on stepper followed by self-stretching and PRE. Today's Tx session emphasized exercises per POC to increase L knee ROM and strength with Pt tolerating Tx with no adverse effects. Pt demonstrates increased ROM when performing exercises during today's Tx session. Pt HEP updated and pt was educated on exercise dosing, DOMS, pain science, and safety awareness. PT will continue per POC, progressing as tolerated. Patient will continue to benefit from skilled PT services to modify and progress therapeutic interventions, address functional mobility deficits, address ROM deficits, address strength deficits, analyze and address soft tissue restrictions, analyze and cue movement patterns, analyze and modify body mechanics/ergonomics, assess and modify postural abnormalities, and address imbalance/dizziness to attain remaining goals.      []  See Plan of Care  [x]  See progress note/recertification  []  See Discharge Summary             PLAN  []  Upgrade activities as tolerated     []  Continue plan of care  []  Update interventions per flow sheet       []  Discharge due to:_  []  Other:_      Jessica Bang, PT, DPT 12/29/2022  3:26 PM

## 2022-12-29 NOTE — THERAPY RECERTIFICATION
John Bach, [address] [facility], [address] [place] [address]  Phone: [phone]    Fax: [phone]   Progress Note/CONTINUED PLAN OF CARE for PHYSICAL THERAPY          Patient Name: Marbella Garnica : 1950   Treatment/Medical Diagnosis: Pain in left knee [M25.562]   Onset Date: 10/17/22    Referral Source: Lana Espino MD Start of Care [facility]): 2022   Prior Hospitalization: See Medical History Provider #: [id number]   Prior Level of Function: Independent with pain   Comorbidities: Atrial fibrillation, HLD, HTN, Hyperparathyroidism, Obesity, R TKA (22)   Medications: Verified on Patient Summary List   [facility] from [facility]: 10 Missed Visits: 1     Objective Measures:  Palpation: tenderness grossly along left knee, intact to light touch         ROM / Strength  [] Unable to assess                  AROM                         PROM                                 Strength       Left Right Left Right Left Right   Hip Flexion         5/5 5/5     Extension                 Abduction                 Adduction               Knee Flexion 111 105 112 115 5/5 5/5     Extension -1 -5 0   5/5 5/5   Ankle Plantarflexion         5/5 5/5     Dorsiflexion         5/5 5/5         Balance: Standing static: Good, Standing dynamic: Fair     Other tests/comments:   FOTO: 55;  47 (intake)  LEFS: 42.6;  32.2 (intake)                 Short Term Goals:  Pt will be independent with HEP to improve B knee ROM & strength in 3 weeks. -MET  Pt will improve B knee PROM to 0-120 degrees for improved ADL efficiency in 3 weeks. -PROGRESSING: see chart above  Pt ill improve FOTO score by 5 points for improved function in 3 weeks. -MET     Long Term Goals:  Pt will improve L knee & hip strength to 5/5 for  improved ability to climb steps at home in 6 weeks. -MET  Pt will improve B knee AROM to 0-120 degrees for improved ability to bend & squat with household chores in 6 weeks.  -PROGRESSING: see chart above  Pt will improve FOTO score >10 points for improved function & quality of life in 6 weeks. -PROGRESSING  Pt will report no greater than 1-2/10 pain in L knee with daily activity in 6 weeks. -MET        Key Functional Changes/Progress: Pt has improved ROM, strength, and functional mobility  Problem List: pain affecting function, decrease ROM, decrease strength, edema affecting function, impaired gait/ balance, decrease ADL/ functional abilitiies, decrease activity tolerance, decrease flexibility/ joint mobility, and decrease transfer abilities     Updated Plan of Care:    Treatment Plan to include the following per provider discretion: Therapeutic exercise, Neuromuscular reeducation, Manual therapy, Therapeutic activity, Self care/home management, Electric stim unattended , Vasopneumatic device, and Gait training    Frequency / Duration:  Patient to be seen   2   times per week for   4-6  weeks    Assessment/ Patient Update: This 72 y/o male pt is s/p L TKA on 10/17/22 and has been participating well in PT this reporting period. Pt is making good progress toward his goals and has increased strength, ROM, and functional mobility. Pt HEP updated and pt has been educated on proper HEP dosing, walking program, DOMS, and safety awareness. Pt continues to exhibit deficits in ROM, strength, activity tolerance, and functional mobility. Pt will continue to benefit from skilled PT to address the above impairments. If you have any questions/comments please contact us directly at [phone]. Thank you for allowing us to assist in the care of your patient. Therapist Signature: Deirdre Bernal PT, DPT Date: 75/38/0448   Certification Period:  Reporting Period: 12/29/22 - 3/29/23  11/16/22 - 12/29/22 Time: 3:34 PM   NOTE TO PHYSICIAN:  PLEASE COMPLETE THE ORDERS BELOW AND FAX TO   [facility]: [phone].   If you are unable to process this request in 24 hours please contact our office: [phone].    ___ I have read the above report and request that my patient continue as recommended.   ___ I have read the above report and request that my patient continue therapy with the following changes/special instructions: ________________________________________________   ___ I have read the above report and request that my patient be discharged from therapy.      Physician Signature:        Date:       Time:

## 2023-01-03 ENCOUNTER — HOSPITAL ENCOUNTER (OUTPATIENT)
Dept: PHYSICAL THERAPY | Age: 73
End: 2023-01-03
Payer: MEDICARE

## 2023-01-03 PROCEDURE — 97110 THERAPEUTIC EXERCISES: CPT

## 2023-01-03 NOTE — PROGRESS NOTES
PT DAILY TREATMENT NOTE 8    Patient Name: Abilio Knutson  Date:1/3/2023  : 1950  [x]  Patient  Verified  [place]or[place] / Plan: [place] MED[place] & [place] / Product Type: Medicare /    In BDUV:9300  Out time:0904  Total Treatment Time (min): 43  Total Timed Codes (min): 43  1:1 Treatment Time (min): 43   Visit #: 11     Treatment Area: Pain in left knee [M25.562]    SUBJECTIVE  Pt reports stiffness as chief complaint. Pain Level (0-10 scale): 0    Any medication changes, allergies to medications, adverse drug reactions, diagnosis change, or new procedure performed?: [x] No    [] Yes (see summary sheet for update)    OBJECTIVE  Modality rationale: Declined   Min Type Additional Details    [] Estim: []Att   []Unatt  []TENS instruct                 []IFC  []Premod []NMES                       []Other:  []w/US   []w/ice   []w/heat  Position:  Location:    []  Traction: [] Cervical       []Lumbar                       [] Prone          []Supine                       []Intermittent   []Continuous Lbs:  [] before manual  [] after manual    []  Ultrasound: []Continuous   [] Pulsed                           []1MHz   []3MHz Location:  W/cm2:    []  Ice     []  heat  []  Ice massage Position:  Location:    []  Vasopneumatic Device Pressure: [] lo [] med [] hi   Temp: [] lo [] med [] hi   [] Skin assessment post-treatment:  []intact []redness- no adverse reaction       []redness - adverse reaction:     43 min Therapeutic Exercise:  [x] See flow sheet :   Rationale: increase ROM, increase strength, improve coordination, improve balance, and increase proprioception to improve the patients ability to achieve full AROM and strength while remaining pain free.            With TE  TA   NR  GT   Misc Patient Education: [x] Review HEP    [] Progressed/Changed HEP based on:   [] positioning   [] body mechanics   [] transfers   [] heat/ice application        Pain Level (0-10 scale) post treatment: 0    ASSESSMENT/Changes in Function: Continued with updated POC working to address range of motion, strength, and balance deficits. Session began with stationary bicycle followed by B LE stretches. Continued with OKC and CKC correcting form and kush as necessary. Introduced lateral step ups and step downs to improve stability and eccentric control. Balance was tested with tandem walk. Pt needing B UE support to maintain balance. Plan to continue POC progressing as able. Patient will continue to benefit from skilled PT services to modify and progress therapeutic interventions, address functional mobility deficits, address ROM deficits, address strength deficits, analyze and address soft tissue restrictions, analyze and cue movement patterns, analyze and modify body mechanics/ergonomics, assess and modify postural abnormalities, and address imbalance/dizziness to attain remaining goals.      [x]  See Plan of Care  []  See progress note/recertification  []  See Discharge Summary         PLAN  []  Upgrade activities as tolerated     [x]  Continue plan of care  []  Update interventions per flow sheet       []  Discharge due to:_  []  Other:    Mickey Kim  1/3/2023  9:06 AM

## 2023-01-05 ENCOUNTER — HOSPITAL ENCOUNTER (OUTPATIENT)
Dept: PHYSICAL THERAPY | Age: 73
End: 2023-01-05
Payer: MEDICARE

## 2023-01-05 PROCEDURE — 97110 THERAPEUTIC EXERCISES: CPT

## 2023-01-05 NOTE — PROGRESS NOTES
PT DAILY TREATMENT NOTE 8    Patient Name: Rocio Sotelo  Date:2023  : 1950  [x]  Patient  Verified  Payor: [place] MEDICARE / Plan: [place] MEDICARE PART A & B / Product Type: Medicare /    In time:802  Out time:829  Total Treatment Time (min): 27  Total Timed Codes (min): 27  1:1 Treatment Time (min): 27   Visit #: 12     Treatment Area: Pain in left knee [M25.562]    SUBJECTIVE  Pt reported just some stiffness today. Pt ended session early this morning secondary to nausea. Stated that he suspects he forgot to take his medication for such.      Pain Level (0-10 scale): 0/10    Any medication changes, allergies to medications, adverse drug reactions, diagnosis change, or new procedure performed?: [x] No    [] Yes (see summary sheet for update)        OBJECTIVE  Modality rationale: NA   Min Type Additional Details    [] Estim: []Att   []Unatt  []TENS instruct                 []IFC  []Premod []NMES                       []Other:  []w/US   []w/ice   []w/heat  Position:  Location:    []  Traction: [] Cervical       []Lumbar                       [] Prone          []Supine                       []Intermittent   []Continuous Lbs:  [] before manual  [] after manual    []  Ultrasound: []Continuous   [] Pulsed                           []1MHz   []3MHz Location:  W/cm2:    []  Iontophoresis with dexamethasone         Location: [] Take home patch   [] In clinic    []  Ice     []  heat  []  Ice massage Position:  Location:    []  Vasopneumatic Device Pressure: [] lo [] med [] hi   Temp: [] lo [] med [] hi   [] Skin assessment post-treatment:  []intact []redness- no adverse reaction       []redness - adverse reaction:           27 min Therapeutic Exercise:  [x] See flow sheet :   Rationale: increase ROM, increase strength, improve coordination, and improve balance to improve the patients ability to return to prior level of function before injury/illness with reduced pain, achieving optimal strength and function to perform household tasks, daily activities, and return to community events, and/or work. min Therapeutic Activity:  []  See flow sheet :   Rationale:       min Neuromuscular Re-education:  []  See flow sheet :   Rationale:      min Manual Therapy:     Rationale:      min Gait Training:  ___ feet with ___ device on level surfaces with ___ level of assist   Rationale: With TE  NURY   NR  GT   [facility] [place]: [x] Review HEP    [] Progressed/Changed HEP based on:   [] positioning   [] body mechanics   [] transfers   [] heat/ice application          Pain Level (0-10 scale) post treatment: 0/10    ASSESSMENT/Changes in Function: Began session with active warm up  on LBE, followed by exercises as noted per flow sheet today. Pt began to  feel poorly mid session and requested that he be done for the day. Suspects medication issue this morning. Stated ok to drive home. Plan to resume normal session next visit. X2 sessions remain then plan to DC. Patient will continue to benefit from skilled PT services to modify and progress therapeutic interventions, address functional mobility deficits, address ROM deficits, address strength deficits, and analyze and cue movement patterns to attain remaining goals.      [x]  See Plan of Care  []  See progress note/recertification  []  See Discharge Summary           PLAN  [x]  Upgrade activities as tolerated     [x]  Continue plan of care  []  Update interventions per flow sheet       []  Discharge due to:_  []  Other:_      Apoorva Brown PTA, LPTA 1/5/2023  10:50 AM

## 2023-01-10 ENCOUNTER — APPOINTMENT (OUTPATIENT)
Dept: PHYSICAL THERAPY | Age: 73
End: 2023-01-10
Payer: MEDICARE

## 2023-01-12 ENCOUNTER — APPOINTMENT (OUTPATIENT)
Dept: PHYSICAL THERAPY | Age: 73
End: 2023-01-12
Payer: MEDICARE

## 2023-03-17 NOTE — TELEPHONE ENCOUNTER
Patient called in stating that he has some drainage from his incision. He is set to come back in tomorrow to see Dr. Moses Fisher. Would you like him to come in this afternoon or wait? right upper arm

## 2024-04-03 ENCOUNTER — HOSPITAL ENCOUNTER (OUTPATIENT)
Age: 74
Setting detail: RECURRING SERIES
Discharge: HOME | End: 2024-04-06
Payer: MEDICARE

## 2024-04-03 PROCEDURE — 97110 THERAPEUTIC EXERCISES: CPT

## 2024-04-03 PROCEDURE — 97140 MANUAL THERAPY 1/> REGIONS: CPT

## 2024-04-03 PROCEDURE — 97162 PT EVAL MOD COMPLEX 30 MIN: CPT

## 2024-04-03 NOTE — PLAN OF CARE
[facility] REHABILIT[facility]  PHYSICAL THERAPY  [facility], [address] [place]  [address]  Phone: [phone]    Fax: [phone]     PLAN OF CARE / STATEMENT OF MEDICAL NECESSITY FOR PHYSICAL THERAPY SERVICES  Patient Name: Cisco Howard : 1950   Medical   Diagnosis: Plantar fascial fibromatosis [M72.2] Treatment Diagnosis: Right plantar fasciitis, right foot pain   Onset Date: 2024     Referral Source: Ryanne Arredondo DPM Start of Care (SOC): 4/3/2024   Prior Hospitalization: See medical history Provider #: [id number]   Prior Level of Function: Active lifestyle with participating in [employer] activities and volunteering   Comorbidities: Past Medical History:   Diagnosis Date    Chronic atrial fibrillation (HCC)     Chronic diarrhea     HLD (hyperlipidemia)     HTN (hypertension)     Hyperparathyroidism (HCC)     Impaired glucose tolerance     Obesity     Pleural effusion       Medications: Verified on Patient Summary List   The Plan of Care and following information is based on the information from the initial evaluation.   ==========================================================================================  Assessment / Functional Analysis:    Pt is a 73 y.o. year old  male who presents to outpatient clinic today with complaints of right foot pain with reports of heel spur and referral for plantar fasciitis. Upon assessment, patient demo reduced AROM/PROM of right ankle for dorsiflexion and first toe flexion extension with tightness in hamstring, quadriceps and preferred positioning of right hip in ER with inability to achieve IR in neutral hip position with right lower extremity weakness. Due to these deficits, patient has moderate gait deviations increasing the load at his right foot. Also contributing to this issue is poor footwear with narrow shoes limiting ability for foot to absorb ground reaction forces. Objectively patient marshao Delilah at

## 2024-04-03 NOTE — THERAPY EVALUATION
ANKLE EVAL/ PT DAILY TREATMENT NOTE 10-18    Patient Name: Cisco Howard  Date:4/3/2024  : 1950  [x]  Patient  Verified  Payor: MEDICARE / Plan: MEDICARE PART A AND B / Product Type: *No Product type* /    In time:834  Out time:928  Total Treatment Time (min): 54  Visit #: 1 of 16    Medicare/BCBS Only   Total Timed Codes (min):  54 1:1 Treatment Time:  54     Treatment Area: Plantar fascial fibromatosis [M72.2]    SUBJECTIVE  Patient reports pain started 2-3 months ago with inability to ambulate at first with slow improvement with initial use of walker now progressing to cane. Patient states he also has gained 20lb because of reduced mobility. Patient reports previous history of left foot pain which resolved. Patient states podiatrist showed him he had a heel spur on his left calcaneus but PT unable to see x-rays. Patient reports for treatment he got a right foot injection, steroids and use of orthotics, he reports 1 day of improvement from injection and improvement with use of orthotics.    Pt. Goals: To be able to walk without pain to be active in community and participate in [employer] activities    Pain Level (0-10 scale): Current 4/10 Best:  0/10 rest, best with movement 4/10  Worst: 8/10 2 weeks ago  []constant []intermittent [x]improving []worsening []no change since onset      PMH:   Past Medical History:   Diagnosis Date    Chronic atrial fibrillation (HCC)     Chronic diarrhea     HLD (hyperlipidemia)     HTN (hypertension)     Hyperparathyroidism (HCC)     Impaired glucose tolerance     Obesity     Pleural effusion          Imaging: none for right foot  Prior Treatment: injection into right foot on a Friday lasted till Monday, steroids with poor tolerance with increased edema, and use of orthotics with mild improvement  Prone position of comfort his hip ER, with PROM able to achieve neutral only on right    Any medication changes, allergies to medications, adverse drug reactions, diagnosis

## 2024-04-05 ENCOUNTER — HOSPITAL ENCOUNTER (OUTPATIENT)
Age: 74
Setting detail: RECURRING SERIES
Discharge: HOME | End: 2024-04-08
Payer: MEDICARE

## 2024-04-05 PROCEDURE — 97110 THERAPEUTIC EXERCISES: CPT

## 2024-04-05 PROCEDURE — 97140 MANUAL THERAPY 1/> REGIONS: CPT

## 2024-04-05 NOTE — PROGRESS NOTES
PT DAILY TREATMENT NOTE     Patient Name: Cisco Howard  Date:2024  : 1950  [x]  Patient  Verified  Payor: MEDICARE / Plan: MEDICARE PART A AND B / Product Type: *No Product type* /    In time:0800  Out time:0853  Total Treatment Time (min): 53  Total Timed Codes (min): 43  1:1 Treatment Time (min): 43   Visit #: 2 of 16    Treatment Area: Plantar fascial fibromatosis [M72.2]    SUBJECTIVE  Pt arrives stating that he has pain and is wearing his inserts that his doctor had ordered for him. Insole is a hard 3/4 length insole.     Pain Level (0-10 scale): 4    Any medication changes, allergies to medications, adverse drug reactions, diagnosis change, or new procedure performed?: [x] No    [] Yes (see summary sheet for update)        OBJECTIVE  Modality rationale: decrease inflammation, decrease pain, and increase tissue extensibility to improve the patient’s ability to perform daily exercises with improved mobility and decreased pain.    Min Type Additional Details    [] Estim: []Att   []Unatt  []TENS instruct                 []IFC  []Premod []NMES                       []Other:  []w/US   []w/ice   []w/heat  Position:  Location:    []  Traction: [] Cervical       []Lumbar                       [] Prone          []Supine                       []Intermittent   []Continuous Lbs:  [] before manual  [] after manual    []  Ultrasound: []Continuous   [] Pulsed                           []1MHz   []3MHz Location:  W/cm2:    []  Iontophoresis with dexamethasone         Location: [] Take home patch   [] In clinic   10 []  Ice     [x]  heat  []  Ice massage Position: Long sitting  Location: R foot    []  Vasopneumatic Device Pressure: [] lo [] med [] hi   Temp: [] lo [] med [] hi   [] Skin assessment post-treatment:  []intact []redness- no adverse reaction       []redness - adverse reaction:           33 min Therapeutic Exercise:  [x] See flow sheet :   Rationale: increase ROM, increase strength, improve

## 2024-04-08 ENCOUNTER — HOSPITAL ENCOUNTER (OUTPATIENT)
Age: 74
Setting detail: RECURRING SERIES
Discharge: HOME | End: 2024-04-11
Payer: MEDICARE

## 2024-04-08 PROCEDURE — 97140 MANUAL THERAPY 1/> REGIONS: CPT

## 2024-04-08 PROCEDURE — 97110 THERAPEUTIC EXERCISES: CPT

## 2024-04-08 NOTE — PROGRESS NOTES
proprioception to improve the patient’s ability to return to prior level of function before injury/illness with reduced pain, achieving optimal strength and function to perform household tasks, daily activities, and return to community events, and/or work.       min Therapeutic Activity:  []  See flow sheet :   Rationale:       min Neuromuscular Re-education:  []  See flow sheet :   Rationale:       10 min Manual Therapy:  STM to R foot     Rationale: decrease pain, increase ROM, increase tissue extensibility, and decrease trigger points to reduce tenderness and tightness at plantar fasciitis, flexor hallucis longus and tightness at achilles to improve mobility.      min Gait Training:  ___ feet with ___ device on level surfaces with ___ level of assist   Rationale:           With TE  TA   NR  GT   Misc Patient Education: [x] Review HEP    [] Progressed/Changed HEP based on:   [] positioning   [] body mechanics   [] transfers   [] heat/ice application          Pain Level (0-10 scale) post treatment: 2/10    ASSESSMENT/Changes in Function: Session began with MHP to R ankle/heel followed by STM to R foot as documented above. Review of HEP including gastroc, soleus and flexor hallucis stretching. Continued  HS stretch, quad stretch (standing using stair step), bridging and ankle 4 way with graded resistance thera band (red). Standing toe raises and heel raises, introduced eccentric single leg squat and toe yoga using red graded resistance band.  No adverse reaction with exercise.  Ended session using foot roller (blue peanut) in effort to reduce pain and tightness to improve overall mobility. Patient education on importance of HEP compliance. Continue with POC.    Patient will continue to benefit from skilled PT services to modify and progress therapeutic interventions, analyze and address functional mobility deficits, analyze and address ROM deficits, analyze and address strength deficits, analyze and address soft

## 2024-04-12 ENCOUNTER — HOSPITAL ENCOUNTER (OUTPATIENT)
Age: 74
Setting detail: RECURRING SERIES
End: 2024-04-12
Payer: MEDICARE

## 2024-04-15 ENCOUNTER — HOSPITAL ENCOUNTER (OUTPATIENT)
Age: 74
Setting detail: RECURRING SERIES
Discharge: HOME | End: 2024-04-18
Payer: MEDICARE

## 2024-04-15 PROCEDURE — 97140 MANUAL THERAPY 1/> REGIONS: CPT

## 2024-04-15 PROCEDURE — 97110 THERAPEUTIC EXERCISES: CPT

## 2024-04-15 NOTE — PROGRESS NOTES
mPT DAILY TREATMENT NOTE     Patient Name: Cisco Howard  Date:4/15/2024  : 1950  [x]  Patient  Verified  Payor: MEDICARE / Plan: MEDICARE PART A AND B / Product Type: *No Product type* /    In time:756  Out time:847  Total Treatment Time (min): 53  Total Timed Codes (min): 43  1:1 Treatment Time (min): 43  Visit #: 4 of 16    Treatment Area: Plantar fascial fibromatosis [M72.2]    SUBJECTIVE  Pt reports that he was able to do some yard work over the weekend and notices after using his frozen water bottle to rollout his foot he is able to stand and weight bear with less pain.  Pain Level (0-10 scale): 3    Any medication changes, allergies to medications, adverse drug reactions, diagnosis change, or new procedure performed?: [x] No    [] Yes (see summary sheet for update)        OBJECTIVE  Modality rationale: decrease inflammation, decrease pain, and increase tissue extensibility to improve the patient’s ability to perform daily exercises with improved mobility and decreased pain.    Min Type Additional Details    [] Estim: []Att   []Unatt  []TENS instruct                 []IFC  []Premod []NMES                       []Other:  []w/US   []w/ice   []w/heat  Position:  Location:    []  Traction: [] Cervical       []Lumbar                       [] Prone          []Supine                       []Intermittent   []Continuous Lbs:  [] before manual  [] after manual    []  Ultrasound: []Continuous   [] Pulsed                           []1MHz   []3MHz Location:  W/cm2:    []  Iontophoresis with dexamethasone         Location: [] Take home patch   [] In clinic   10 []  Ice     [x]  heat  []  Ice massage Position: Long sitting  Location: R foot    []  Vasopneumatic Device Pressure: [] lo [] med [] hi   Temp: [] lo [] med [] hi   [] Skin assessment post-treatment:  []intact []redness- no adverse reaction       []redness - adverse reaction:           33 min Therapeutic Exercise:  [x] See flow sheet :

## 2024-04-18 ENCOUNTER — HOSPITAL ENCOUNTER (OUTPATIENT)
Age: 74
Setting detail: RECURRING SERIES
Discharge: HOME | End: 2024-04-21
Payer: MEDICARE

## 2024-04-18 PROCEDURE — 97140 MANUAL THERAPY 1/> REGIONS: CPT

## 2024-04-18 PROCEDURE — 97110 THERAPEUTIC EXERCISES: CPT

## 2024-04-18 NOTE — PROGRESS NOTES
mPT DAILY TREATMENT NOTE     Patient Name: Cisco Howard  Date:2024  : 1950  [x]  Patient  Verified  Payor: MEDICARE / Plan: MEDICARE PART A AND B / Product Type: *No Product type* /    In time:0800  Out time: 0848  Total Treatment Time (min): 48  Total Timed Codes (min): 38  1:1 Treatment Time (min): 38  Visit #: 5 of 16    Treatment Area: Plantar fascial fibromatosis [M72.2]    SUBJECTIVE  Pt reports that he did some yard work yesterday and over did his activity.  The highest my pain went was 4/10. \"I have not put my heel orthotics in my shoes and I did not do the rolling on the frozen water bottle.\"  Pain Level (0-10 scale): 3    Any medication changes, allergies to medications, adverse drug reactions, diagnosis change, or new procedure performed?: [x] No    [] Yes (see summary sheet for update)        OBJECTIVE  Modality rationale: decrease inflammation, decrease pain, and increase tissue extensibility to improve the patient’s ability to perform daily exercises with improved mobility and decreased pain.    Min Type Additional Details    [] Estim: []Att   []Unatt  []TENS instruct                 []IFC  []Premod []NMES                       []Other:  []w/US   []w/ice   []w/heat  Position:  Location:    []  Traction: [] Cervical       []Lumbar                       [] Prone          []Supine                       []Intermittent   []Continuous Lbs:  [] before manual  [] after manual    []  Ultrasound: []Continuous   [] Pulsed                           []1MHz   []3MHz Location:  W/cm2:    []  Iontophoresis with dexamethasone         Location: [] Take home patch   [] In clinic   10 []  Ice     [x]  heat  []  Ice massage Position: Long sitting  Location: R foot    []  Vasopneumatic Device Pressure: [] lo [] med [] hi   Temp: [] lo [] med [] hi   [] Skin assessment post-treatment:  []intact []redness- no adverse reaction       []redness - adverse reaction:           28 min Therapeutic Exercise:

## 2024-04-22 ENCOUNTER — HOSPITAL ENCOUNTER (OUTPATIENT)
Age: 74
Setting detail: RECURRING SERIES
Discharge: HOME | End: 2024-04-25
Payer: MEDICARE

## 2024-04-22 PROCEDURE — 97110 THERAPEUTIC EXERCISES: CPT

## 2024-04-22 PROCEDURE — 97140 MANUAL THERAPY 1/> REGIONS: CPT

## 2024-04-22 PROCEDURE — 97150 GROUP THERAPEUTIC PROCEDURES: CPT

## 2024-04-22 NOTE — PROGRESS NOTES
mPT DAILY TREATMENT NOTE     Patient Name: Cisco Howard  Date:2024  : 1950  [x]  Patient  Verified  Payor: MEDICARE / Plan: MEDICARE PART A AND B / Product Type: *No Product type* /    In time:0800  Out time: 09  Total Treatment Time (min): 66  Total Timed Codes (min): 56  1:1 Treatment Time (min): 56  Visit #: 6 of 16    Treatment Area: Plantar fascial fibromatosis [M72.2]    SUBJECTIVE  Pt reports that he has less pain today and feels like there is less tightness.     Pain Level (0-10 scale): 3    Any medication changes, allergies to medications, adverse drug reactions, diagnosis change, or new procedure performed?: [x] No    [] Yes (see summary sheet for update)        OBJECTIVE  Modality rationale: decrease inflammation, decrease pain, and increase tissue extensibility to improve the patient’s ability to perform daily exercises with improved mobility and decreased pain.    Min Type Additional Details    [] Estim: []Att   []Unatt  []TENS instruct                 []IFC  []Premod []NMES                       []Other:  []w/US   []w/ice   []w/heat  Position:  Location:    []  Traction: [] Cervical       []Lumbar                       [] Prone          []Supine                       []Intermittent   []Continuous Lbs:  [] before manual  [] after manual    []  Ultrasound: []Continuous   [] Pulsed                           []1MHz   []3MHz Location:  W/cm2:    []  Iontophoresis with dexamethasone         Location: [] Take home patch   [] In clinic   10 []  Ice     [x]  heat  []  Ice massage Position: Long sitting  Location: R foot    []  Vasopneumatic Device Pressure: [] lo [] med [] hi   Temp: [] lo [] med [] hi   [] Skin assessment post-treatment:  []intact []redness- no adverse reaction       []redness - adverse reaction:           36 min Therapeutic Exercise:  [x] See flow sheet :   Rationale: increase ROM, increase strength, improve balance, and increase proprioception to improve the

## 2024-04-26 ENCOUNTER — APPOINTMENT (OUTPATIENT)
Age: 74
End: 2024-04-26
Payer: MEDICARE

## 2024-04-29 ENCOUNTER — APPOINTMENT (OUTPATIENT)
Age: 74
End: 2024-04-29
Payer: MEDICARE

## (undated) DEVICE — 3M™ TEGADERM™ HP TRANSPARENT FILM DRESSING FRAME STYLE, 9546HP, 4 IN X 4-1/2 IN (10 CM X 11.5 CM), 50/CT 4CT/CASE: Brand: 3M™ TEGADERM™

## (undated) DEVICE — 450 ML BOTTLE OF 0.05% CHLORHEXIDINE GLUCONATE IN 99.95% STERILE WATER FOR IRRIGATION, USP AND APPLICATOR.: Brand: IRRISEPT ANTIMICROBIAL WOUND LAVAGE

## (undated) DEVICE — BNDG,ELSTC,MATRIX,STRL,6"X5YD,LF,HOOK&LP: Brand: MEDLINE

## (undated) DEVICE — BANDAGE COBAN 4 IN COMPR W4INXL5YD FOAM COHESIVE QUIK STK SELF ADH SFT

## (undated) DEVICE — BLADE SAW W12.5XL70MM THK1MM RECIP DBL SIDE OFFSET

## (undated) DEVICE — 3M™ IOBAN™ 2 ANTIMICROBIAL INCISE DRAPE 6650EZ: Brand: IOBAN™ 2

## (undated) DEVICE — WRAP KNEE UNIV E STRP HK AND LOOP W/ GEL PK MEDCOOL

## (undated) DEVICE — HOOD WITH PEEL AWAY FACE SHIELD: Brand: T7PLUS

## (undated) DEVICE — SPONGE LAP W18XL18IN WHT COT 4 PLY FLD STRUNG RADPQ DISP ST

## (undated) DEVICE — BLADE ELECTRODE: Brand: VALLEYLAB

## (undated) DEVICE — GLOVE SURG UNDERGLOVE 7.5 PF BLU BIOGEL PI MIC LF

## (undated) DEVICE — HYPODERMIC SAFETY NEEDLE: Brand: MAGELLAN

## (undated) DEVICE — GLOVE SURG SZ 65 THK91MIL LTX FREE SYN POLYISOPRENE

## (undated) DEVICE — GLOVE SURG 7 BIOGEL PI ULTRATOUCH G

## (undated) DEVICE — PREP SKN CHLRAPRP APL 26ML STR --

## (undated) DEVICE — GOWN,AURORA,FABRIC-REINFORCED,X-LARGE: Brand: MEDLINE

## (undated) DEVICE — DRESSING ANTIMIC FOAM OPTIFOAM POSTOP ADH 4X14 IN

## (undated) DEVICE — COVER,TABLE,HEAVY DUTY,77"X90",STRL: Brand: MEDLINE

## (undated) DEVICE — SHEET,DRAPE,70X100,STERILE: Brand: MEDLINE

## (undated) DEVICE — STRYKER PERFORMANCE SERIES SAGITTAL BLADE: Brand: STRYKER PERFORMANCE SERIES

## (undated) DEVICE — (D)HANDPIECE IRR W/HI FLO TIP -- DUPLICATE USE ITEM 121586

## (undated) DEVICE — DRAPE,TOP,102X53,STERILE: Brand: MEDLINE

## (undated) DEVICE — SOL IRR NACL 0.9% 500ML POUR --

## (undated) DEVICE — SUTURE VCRL SZ 3-0 L27IN ABSRB UD L24MM PS-1 3/8 CIR PRIM J936H

## (undated) DEVICE — SUT VCRL + 2-0 27IN CT2 UD -- 36/BX

## (undated) DEVICE — GLOVE SURG SZ 65 L12IN FNGR THK79MIL GRN LTX FREE

## (undated) DEVICE — SKIN CLOS DERMABND PRINEO 60CM -- DERMABOUND PRINEO

## (undated) DEVICE — GOWN,PRECEPT,XLNG/XXLARGE,STRL: Brand: MEDLINE

## (undated) DEVICE — SUTURE VCRL + SZ 0 L27IN ANTIBACTERIAL POLYGLACTIN 910 W VCP280H

## (undated) DEVICE — DISPOSABLE TOURNIQUET CUFF SINGLE BLADDER, DUAL PORT AND QUICK CONNECT CONNECTOR: Brand: COLOR CUFF

## (undated) DEVICE — TOTAL KNEE PACK: Brand: MEDLINE INDUSTRIES, INC.

## (undated) DEVICE — POWDER HEMOSTAT GEL 3.0GR -- SURGICEL

## (undated) DEVICE — SUTURE VCRL + SZ 1 L27IN ANTIBACTERIAL POLYGLACTIN 910 W VCP281H

## (undated) DEVICE — INTENDED FOR TISSUE SEPARATION, AND OTHER PROCEDURES THAT REQUIRE A SHARP SURGICAL BLADE TO PUNCTURE OR CUT.: Brand: BARD-PARKER SAFETY BLADES SIZE 10, STERILE

## (undated) DEVICE — GOWN,PRECEPT, XLNG/XLRG ,STRL: Brand: MEDLINE

## (undated) DEVICE — BOWL MIXING VAC PALABOWL PALACOS

## (undated) DEVICE — GARMENT COMPR M FOR 13IN FT INTMIT SGL BLDR HEM FORC II

## (undated) DEVICE — GLOVE ORANGE PI 7   MSG9070

## (undated) DEVICE — GLOVE SURG SZ 75 L12IN FNGR THK79MIL GRN LTX FREE

## (undated) DEVICE — POWDER SURG CELLERATE RX 1 GM HYDROL COLLEGEN

## (undated) DEVICE — ATTUNE SOLO PINNING SYSTEM

## (undated) DEVICE — GLOVE ORANGE PI 8   MSG9080

## (undated) DEVICE — ZIMMER® STERILE DISPOSABLE TOURNIQUET CUFF WITH PLC, DUAL PORT, SINGLE BLADDER, 34 IN. (86 CM)

## (undated) DEVICE — TOWEL,OR,DSP,ST,BLUE,STD,4/PK,20PK/CS: Brand: MEDLINE

## (undated) DEVICE — GLOVE ORANGE PI 8 1/2   MSG9085

## (undated) DEVICE — PAD,ABDOMINAL,5"X9",STERILE,LF,1/PK: Brand: MEDLINE INDUSTRIES, INC.

## (undated) DEVICE — 4-PORT MANIFOLD: Brand: NEPTUNE 2